# Patient Record
Sex: FEMALE | Race: BLACK OR AFRICAN AMERICAN | Employment: OTHER | URBAN - METROPOLITAN AREA
[De-identification: names, ages, dates, MRNs, and addresses within clinical notes are randomized per-mention and may not be internally consistent; named-entity substitution may affect disease eponyms.]

---

## 2017-01-04 ENCOUNTER — HOSPITAL ENCOUNTER (OUTPATIENT)
Dept: MAMMOGRAPHY | Age: 67
Discharge: HOME | End: 2017-01-04
Attending: INTERNAL MEDICINE
Payer: MEDICAID

## 2017-01-04 DIAGNOSIS — M81.0 OSTEOPOROSIS: ICD-10-CM

## 2017-01-04 PROCEDURE — 77080 DXA BONE DENSITY AXIAL: CPT

## 2017-01-04 NOTE — LETTER
1/16/2017 3:03 PM 
 
Ms. Cj Mercado 
[address] [facility] [address] [id number] Dear Cj Mercado: 
 
Please find your most recent results below. Resulted Orders DEXA BONE DENSITY STUDY AXIAL Narrative Bone Mineral Density Indication:  osteoporosis Age: 61 Sex: Female. Menopause status: Postmenopausal. 
Hormone replacement therapy: No  
 
Number of falls in the past year:   None. Risk factors for osteoporosis:  History of low trauma fracture, history of 
rheumatoid arthritis Current medication for osteoporosis: Actonel. Comparison: None. Technique: Imaging was performed on the [address] [place] [address] 
meta-analysis fracture risk calculator (FRAX) analysis was performed for 10 year 
fracture risk probability assessment Excluded sites: None Findings: 
  
  
Femoral Neck:  Left Bone mineral density (gm/cm2):? 0.983 
% of peak bone mass: 95 
% for age matched controls:? 99 
T-score: -0.4 Z-score: -0.1 Total Hip: Left Bone mineral density (gm/cm2):  0.953 
% of peak bone mass:   95 
% for age matched controls:  80 
T-score:   -0.4 Z-score:  -0.5 Lumbar Spine:  L1-L4 Bone mineral density (gm/cm2):  0.846 
% of peak bone mass:  71  
% for age matched controls:  75 
T-score:  -2.8 Z-score:  -2.3 Impression Impression: This patient is osteoporotic using the World Health Organization criteria 10 year probability of major osteoporotic fracture:  4.3% 10 year probability of hip fracture:  0.3% Recommendations: 
Therapy recommendations need to be tailored to each individual patient. Using 
the [facility] FRAX absolute fracture algorithm, the 
[address] [place] [address] recommends beginning pharmacological therapy in 
postmenopausal women and men over the age of 48 with a 8 year probability of a 
hip fracture of >3% OR with the 10 year probability of a major osteoporotic 
fracture of >20%. Please reconsider testing based on risk factors. Currently, Medicare will only 
reimburse for a central DXA examination every two years, unless the patient is 
on chronic glucocorticoid therapy. Note: Please note that reliable, valid comparisons cannot be made between 
studies which have been performed on machines from different manufacturers. If 
clinically warranted, a follow up study performed at this site, on the same 
unit, would allow the most sensitive assessment of change in bone mineral 
density. Has Osteoporosis Start citracal D 1 bid ( if not on Calcium and vit D) Weight bearing exercises/walking RTC to discuss other treatment options Please call me if you have any questions: [phone] Sincerely, [facility] DEXA 1

## 2017-01-05 NOTE — PROGRESS NOTES
Has Osteoporosis  Start citracal D 1 bid ( if not on Calcium and vit D)  Weight bearing exercises/walking  RTC to discuss other treatment options

## 2017-01-13 ENCOUNTER — OFFICE VISIT (OUTPATIENT)
Dept: ENDOCRINOLOGY | Age: 67
End: 2017-01-13

## 2017-01-13 VITALS
DIASTOLIC BLOOD PRESSURE: 85 MMHG | HEART RATE: 61 BPM | BODY MASS INDEX: 28.58 KG/M2 | SYSTOLIC BLOOD PRESSURE: 135 MMHG | WEIGHT: 167.4 LBS | HEIGHT: 64 IN

## 2017-01-13 RX ORDER — IBUPROFEN 800 MG/1
800 TABLET ORAL
COMMUNITY
Start: 2016-10-24 | End: 2017-01-31 | Stop reason: SDUPTHER

## 2017-01-13 RX ORDER — INTERFERON BETA-1A 30MCG/.5ML
KIT INTRAMUSCULAR
COMMUNITY
Start: 2017-01-04 | End: 2017-01-31 | Stop reason: SDUPTHER

## 2017-01-13 RX ORDER — PANTOPRAZOLE SODIUM 40 MG/1
TABLET, DELAYED RELEASE ORAL
COMMUNITY
Start: 2016-10-25 | End: 2017-01-19 | Stop reason: SDUPTHER

## 2017-01-13 RX ORDER — LANCETS 30 GAUGE
EACH MISCELLANEOUS
Refills: 88 | COMMUNITY
Start: 2017-01-03 | End: 2017-12-29

## 2017-01-13 NOTE — PROGRESS NOTES
Chief Complaint   Patient presents with    New Patient    Diabetes     Type II diabetes. . Last A1C - 8.3% - 1/4/17. Patient is testing BS 3 times daily - before meals.  Diabetic Foot Exam     PCP performed last foot exam.     Other     Next eye exam is due 3/1/17       HPI  This is a 70-year-old -American female with a history of type 2 diabetes mellitus and poor blood sugar control with a recent A1c of 8.3%. She was diagnosed with diabetes in 1999. She was tried on metformin but developed diarrhea with this. She was then placed on Glucotrol and more recently a combination of Glucotrol and Januvia which she has been on for the last 2-3 years. Her blood sugars have not been well controlled. She has a history of MS and she has not very difficult social situation with her daughters and granddaughters who live with her and says she has very little time to care for herself. She usually does not eat breakfast but occasionally she will have hashbrowns and rosado. Lunch is usually a [employer] meatloaf and pizza. She will snack on cheese sticks during the afternoon. Supper can be a chicken breast with cabbage and rice or could be soup. She will snack on grapes or sweet potato pie or occasionally walnuts and dried fruit. She drinks tea/lemonade that is low sugar of about 13 g of carbohydrate per serving. She does not exercise. She complains of fatigue but is not sure whether the fatigue is related to situational issues or related to her diabetes. ROS  She complains of intermittent shortness of breath without chest pain. She denies constipation or diarrhea.     Visit Vitals    /85 (BP 1 Location: Right arm, BP Patient Position: Sitting)    Pulse 61    Ht 5' 4\" (1.626 m)    Wt 167 lb 6.4 oz (75.9 kg)    BMI 28.73 kg/m2       Physical Examination: General appearance - alert, well appearing, and in no distress  Mental status - alert, oriented to person, place, and time  Neck - supple, no significant adenopathy, thyroid exam: thyroid is normal in size without nodules or tenderness  Chest - clear to auscultation, no wheezes, rales or rhonchi, symmetric air entry  Heart - normal rate, regular rhythm, normal S1, S2, no murmurs, rubs, clicks or gallops  Abdomen - soft, nontender, nondistended, no masses or organomegaly  Extremities - peripheral pulses normal, no pedal edema, no clubbing or cyanosis    Diabetic foot exam:     Left: Reflexes 4+     Vibratory sensation normal    Filament test normal sensation with micro filament   Pulse DP: 2+ (normal)   Pulse PT: 2+ (normal)   Deformities: None  Right: Reflexes 4+   Vibratory sensation normal   Filament test normal sensation with micro filament   Pulse DP: 2+ (normal)   Pulse PT: 2+ (normal)   Deformities: None      ASSESSMENT & PLAN  This woman has poorly controlled type 2 diabetes mellitus with a recent A1c of 8.3% on Glucotrol plus Januvia. I think she will probably need insulin. Other medications were probably not optimal given her MS and her treatment with intramuscular interferon. I did ask her to try to avoid the snacks and sugar-containing foods and return in 2 weeks after checking blood sugars twice a day. She has not checked any blood sugars since November.

## 2017-01-19 RX ORDER — ESOMEPRAZOLE MAGNESIUM 20 MG/1
1 TABLET, DELAYED RELEASE ORAL DAILY
Qty: 90 TAB | Refills: 2 | Status: SHIPPED | OUTPATIENT
Start: 2017-01-19 | End: 2017-02-07

## 2017-01-19 RX ORDER — MICONAZOLE NITRATE 2 %
1 CREAM WITH APPLICATOR VAGINAL 2 TIMES DAILY
Qty: 180 TAB | Refills: 2 | Status: SHIPPED | OUTPATIENT
Start: 2017-01-19 | End: 2017-01-20 | Stop reason: SDUPTHER

## 2017-01-20 RX ORDER — VITAMIN E (DL,TOCOPHERYL ACET) 45 MG/0.25
DROPS ORAL
Qty: 60 TAB | Refills: 11 | Status: SHIPPED | OUTPATIENT
Start: 2017-01-20 | End: 2017-02-07

## 2017-01-25 RX ORDER — MICONAZOLE NITRATE 2 %
2 CREAM WITH APPLICATOR VAGINAL 2 TIMES DAILY
COMMUNITY
End: 2017-01-31 | Stop reason: ALTCHOICE

## 2017-01-31 ENCOUNTER — OFFICE VISIT (OUTPATIENT)
Dept: INTERNAL MEDICINE CLINIC | Age: 67
End: 2017-01-31

## 2017-01-31 VITALS
BODY MASS INDEX: 28.34 KG/M2 | SYSTOLIC BLOOD PRESSURE: 125 MMHG | DIASTOLIC BLOOD PRESSURE: 73 MMHG | HEART RATE: 59 BPM | HEIGHT: 64 IN | TEMPERATURE: 97.7 F | RESPIRATION RATE: 18 BRPM | OXYGEN SATURATION: 96 % | WEIGHT: 166 LBS

## 2017-01-31 DIAGNOSIS — I10 ESSENTIAL HYPERTENSION: ICD-10-CM

## 2017-01-31 DIAGNOSIS — F32.A DEPRESSION, UNSPECIFIED DEPRESSION TYPE: ICD-10-CM

## 2017-01-31 DIAGNOSIS — B18.2 HEP C W/O COMA, CHRONIC (HCC): ICD-10-CM

## 2017-01-31 DIAGNOSIS — G35 MS (MULTIPLE SCLEROSIS) (HCC): ICD-10-CM

## 2017-01-31 DIAGNOSIS — E11.9 TYPE 2 DIABETES MELLITUS WITHOUT COMPLICATION, WITHOUT LONG-TERM CURRENT USE OF INSULIN (HCC): Primary | ICD-10-CM

## 2017-01-31 DIAGNOSIS — M81.0 OSTEOPOROSIS: ICD-10-CM

## 2017-01-31 DIAGNOSIS — M06.9 RHEUMATOID ARTHRITIS, INVOLVING UNSPECIFIED SITE, UNSPECIFIED RHEUMATOID FACTOR PRESENCE: ICD-10-CM

## 2017-01-31 RX ORDER — INTERFERON BETA-1A 30MCG/.5ML
0.5 KIT INTRAMUSCULAR
Qty: 4 PEN | Refills: 11 | Status: SHIPPED | OUTPATIENT
Start: 2017-01-31 | End: 2017-12-29

## 2017-01-31 RX ORDER — CITALOPRAM 20 MG/1
TABLET, FILM COATED ORAL
Qty: 30 TAB | Refills: 5 | Status: SHIPPED | OUTPATIENT
Start: 2017-01-31 | End: 2017-08-26 | Stop reason: SDUPTHER

## 2017-01-31 RX ORDER — GLIPIZIDE 10 MG/1
10 TABLET ORAL
Qty: 60 TAB | Refills: 5 | Status: SHIPPED | OUTPATIENT
Start: 2017-01-31 | End: 2017-04-12 | Stop reason: SDUPTHER

## 2017-01-31 RX ORDER — IBUPROFEN 800 MG/1
800 TABLET ORAL
Qty: 30 TAB | Refills: 2 | Status: SHIPPED | OUTPATIENT
Start: 2017-01-31 | End: 2017-02-07

## 2017-01-31 RX ORDER — AMLODIPINE BESYLATE 10 MG/1
TABLET ORAL
Qty: 30 TAB | Refills: 5 | Status: SHIPPED | OUTPATIENT
Start: 2017-01-31 | End: 2017-11-13 | Stop reason: SDUPTHER

## 2017-01-31 RX ORDER — ZOLPIDEM TARTRATE 5 MG/1
5 TABLET ORAL
Qty: 30 TAB | Refills: 0 | Status: SHIPPED | OUTPATIENT
Start: 2017-01-31 | End: 2017-07-14 | Stop reason: SDUPTHER

## 2017-01-31 RX ORDER — CLONIDINE HYDROCHLORIDE 0.1 MG/1
TABLET ORAL
Qty: 60 TAB | Refills: 5 | Status: SHIPPED | OUTPATIENT
Start: 2017-01-31 | End: 2017-08-07 | Stop reason: SDUPTHER

## 2017-01-31 NOTE — PROGRESS NOTES
Reviewed record in preparation for visit and have obtained necessary documentation. Identified pt with two pt identifiers(name and ). Health Maintenance Due   Topic    EYE EXAM RETINAL OR DILATED Q1     COLONOSCOPY     DTaP/Tdap/Td series (1 - Tdap)    ZOSTER VACCINE AGE 60>     MICROALBUMIN Q1          Chief Complaint   Patient presents with    Hypertension    Diabetes    Follow Up Chronic Condition          Learning Assessment:  :     Learning Assessment 2017   PRIMARY LEARNER Patient Patient   HIGHEST LEVEL OF EDUCATION - PRIMARY LEARNER  GRADUATED HIGH SCHOOL OR GED GRADUATED HIGH SCHOOL OR GED   BARRIERS PRIMARY LEARNER OTHER NONE   CO-LEARNER CAREGIVER No No   PRIMARY LANGUAGE ENGLISH ENGLISH   LEARNER PREFERENCE PRIMARY LISTENING LISTENING   ANSWERED BY Patient patient   RELATIONSHIP SELF SELF       Depression Screening:  :     No flowsheet data found. Fall Risk Assessment:  :     No flowsheet data found. Abuse Screening:  :     Abuse Screening Questionnaire 2014   Do you ever feel afraid of your partner? N N   Are you in a relationship with someone who physically or mentally threatens you? N N   Is it safe for you to go home? Y Y       Coordination of Care Questionnaire:  :     1) Have you been to an emergency room, urgent care clinic since your last visit? no   Hospitalized since your last visit? no             2) Have you seen or consulted any other health care providers outside of [address] [place] [address] since your last visit? no  (Include any pap smears or colon screenings in this section.)    3) Do you have an Advance Directive on file? no    4) Are you interested in receiving information on Advance Directives? YES      Patient is accompanied by self I have received verbal consent from Cindi Hogan to discuss any/all medical information while they are present in the room.

## 2017-01-31 NOTE — MR AVS SNAPSHOT
Visit Information Date & Time Provider Department Dept. Phone Encounter #  
 1/31/2017  2:45 PM [facility] Internal Medicine [phone] [id number] Follow-up Instructions Return in about 3 months (around 4/30/2017). Your Appointments 2/7/2017  3:00 PM  
New Patient with DO Kimmy Maloney [facility] [address] [place] [address]) Appt Note: n/p MS $0cp 11/9/16 JR; n/p MS 12/12/2016 CW; n/p MS $0cp 1/3/17 JR; n/p MS $0cp 1/12/2017 CW; n/p MS $0cp 1/26/17 [facility] [address] [facility] [address]  
[address] [facility] [address]  
  
    
 3/3/2017  3:30 PM  
Follow Up with Asia Butcher MD  
[facility] [address] [place] [address]) Appt Note: f/u visit diabetes [address] [id number] [address] [facility] Upcoming Health Maintenance Date Due  
 EYE EXAM RETINAL OR DILATED Q1 7/18/1962 COLONOSCOPY 7/18/1970 DTaP/Tdap/Td series (1 - Tdap) 7/18/1973 ZOSTER VACCINE AGE 60> 7/18/2012 MICROALBUMIN Q1 6/23/2016 HEMOGLOBIN A1C Q6M 7/4/2017 FOOT EXAM Q1 10/25/2017 LIPID PANEL Q1 1/4/2018 BREAST CANCER SCRN MAMMOGRAM 10/17/2018 PAP AKA CERVICAL CYTOLOGY 10/24/2019 Allergies as of 1/31/2017  Review Complete On: 1/31/2017 By: Ollie Mancilla DO Severity Noted Reaction Type Reactions Pcn [Penicillins] High 02/02/2010   Intolerance Swelling Fosamax [Alendronate] Medium 02/02/2010   Intolerance Swelling Sulfa (Sulfonamide Antibiotics) Low 02/02/2010   Topical Itching Current Immunizations  Reviewed on 10/25/2016 Name Date Influenza Nasal Vaccine 9/25/2016 Pneumococcal Polysaccharide (PPSV-23) 1/4/2015 10:52 AM  
  
 Not reviewed this visit You Were Diagnosed With   
  
 Codes Comments Type 2 diabetes mellitus without complication, without long-term current use of insulin (HCC)    -  Primary ICD-10-CM: E11.9 ICD-9-CM: 250.00 Essential hypertension     ICD-10-CM: I10 
ICD-9-CM: 401.9 MS (multiple sclerosis) ([facility]     ICD-10-CM: G35 
ICD-9-CM: 596 Rheumatoid arthritis, involving unspecified site, unspecified rheumatoid factor presence ([facility].)     ICD-10-CM: M06.9 ICD-9-CM: 714.0 Hep C w/o coma, chronic (HCC)     ICD-10-CM: B18.2 ICD-9-CM: 070.54 Osteoporosis     ICD-10-CM: M81.0 ICD-9-CM: 733.00 Depression, unspecified depression type     ICD-10-CM: F32.9 ICD-9-CM: 585 Vitals BP Pulse Temp Resp Height(growth percentile) Weight(growth percentile) 125/73 (BP 1 Location: Right arm, BP Patient Position: Sitting) (!) 59 97.7 °F (36.5 °C) (Oral) 18 5' 4\" (1.626 m) 166 lb (75.3 kg) SpO2 BMI OB Status Smoking Status 96% 28.49 kg/m2 Hysterectomy Never Smoker BMI and BSA Data Body Mass Index Body Surface Area  
 28.49 kg/m 2 1.84 m 2 Preferred Pharmacy Pharmacy Name Phone [facility]/PHARMACY #[id number]hon, [address] [place] [address] [phone] Your Updated Medication List  
  
   
This list is accurate as of: 1/31/17  3:06 PM.  Always use your most recent med list. amLODIPine 10 mg tablet Commonly known as:  Arlyss Hasty TAKE 1 TABLET BY MOUTH EVERY DAY  
  
 ammonium lactate 12 % lotion Commonly known as:  LAC-HYDRIN Apply  to affected area. rub in to affected area well  
  
 aspirin delayed-release 81 mg tablet Take 81 mg by mouth daily. AVONEX 30 mcg/0.5 mL Pnkt Generic drug:  interferon beta-1a  
0.5 mL by IntraMUSCular route every seven (7) days. Blood-Glucose Meter Misc Commonly known as:  ACCU-CHEK ALONSO PLUS METER Test Blood Sugars as directed  
  
 calcium combo no.2-vitamin D3 600 mg calcium- 500 unit Tber Commonly known as:  CITRACAL + D SLOW RELEASE I po BID CINNAMON BARK-CHROMIUM PICOLIN PO Take  by mouth.  
  
 citalopram 20 mg tablet Commonly known as:  CELEXA  
TAKE 1 TABLET BY MOUTH EVERY DAY  
  
 cloNIDine HCl 0.1 mg tablet Commonly known as:  CATAPRES  
TAKE 1 TABLET BY MOUTH TWICE A DAY  
  
 esomeprazole magnesium 20 mg Tbec Commonly known as:  NexIUM 24HR Take 1 Tab by mouth daily. glipiZIDE 10 mg tablet Commonly known as:  Isidro Hanly Take 1 Tab by mouth Before breakfast and dinner. glucose blood VI test strips strip Commonly known as:  CONTOUR NEXT STRIPS Test blood sugar twice daily. ibuprofen 800 mg tablet Commonly known as:  MOTRIN Take 1 Tab by mouth every eight (8) hours as needed. * Lancets Misc Test blood sugar as directed * ULTRA THIN LANCETS 30 gauge Misc Generic drug:  lancets USE TO TEST 3 TIMES DAILY OR AS DIRECTED  
  
 metoprolol succinate 200 mg XL tablet Commonly known as:  TOPROL-XL  
TAKE 1 TABLET BY MOUTH EVERY DAY  
  
 nystatin topical cream  
Commonly known as:  MYCOSTATIN  
  
 SITagliptin 100 mg tablet Commonly known as:  Sadie Bump TAKE 1 TABLET BY MOUTH DAILY  
  
 zolpidem 5 mg tablet Commonly known as:  AMBIEN Take 1 Tab by mouth nightly as needed for Sleep. * Notice: This list has 2 medication(s) that are the same as other medications prescribed for you. Read the directions carefully, and ask your doctor or other care provider to review them with you. Prescriptions Printed Refills  
 zolpidem (AMBIEN) 5 mg tablet 0 Sig: Take 1 Tab by mouth nightly as needed for Sleep. Class: Print Route: Oral  
  
Prescriptions Sent to Pharmacy Refills  
 ibuprofen (MOTRIN) 800 mg tablet 2 Sig: Take 1 Tab by mouth every eight (8) hours as needed. Class: Normal  
 Pharmacy: [address] [place] [address] , [address] [place] [address] Ph #: [phone]  Route: Oral  
 cloNIDine HCl (CATAPRES) 0.1 mg tablet 5 Sig: TAKE 1 TABLET BY MOUTH TWICE A DAY Class: Normal  
 [id number], [address] [place] [address] Ph #: [phone]  
 citalopram (CELEXA) 20 mg tablet 5 Sig: TAKE 1 TABLET BY MOUTH EVERY DAY Class: Normal  
 [id number], [address] [place] [address] Ph #: [phone]  
 amLODIPine (NORVASC) 10 mg tablet 5 Sig: TAKE 1 TABLET BY MOUTH EVERY DAY Class: Normal  
 [id number], [address] [place] [address] Ph #: [phone]  
 glipiZIDE (GLUCOTROL) 10 mg tablet 5 Sig: Take 1 Tab by mouth Before breakfast and dinner. Class: Normal  
 Pharmacy: [address] [place] [address] , [address] [place] [address] Ph #: [phone] Route: Oral  
 AVONEX 30 mcg/0.5 mL pnkt 11 Si.5 mL by IntraMUSCular route every seven (7) days. Class: Normal  
 Pharmacy: [address] [place] [address] , [address] [place] [address] Ph #: [phone] Route: IntraMUSCular Follow-up Instructions Return in about 3 months (around 2017). To-Do List   
 2017 Lab:  HEMOGLOBIN A1C WITH EAG   
  
 2017 Lab:  METABOLIC PANEL, BASIC Introducing [facility]! [facility] introduces [employer] patient portal. Now you can access parts of your medical record, email your doctor's office, and request medication refills online. 1. In your internet browser, go to [email or web address] 2. Click on the First Time User? Click Here link in the Sign In box. You will see the New Member Sign Up page. 3. Enter your [employer] Access Code exactly as it appears below. You will not need to use this code after youve completed the sign-up process. If you do not sign up before the expiration date, you must request a new code. · [employer] Access Code: [email or web address] Expires: 4/3/2017  9:01 AM 
 
4. Enter the last four digits of your Social Security Number (xxxx) and Date of Birth (mm/dd/yyyy) as indicated and click Submit. You will be taken to the next sign-up page. 5. Create a [employer] ID. This will be your [employer] login ID and cannot be changed, so think of one that is secure and easy to remember. 6. Create a [employer] password. You can change your password at any time. 7. Enter your Password Reset Question and Answer. This can be used at a later time if you forget your password. 8. Enter your e-mail address. You will receive e-mail notification when new information is available in [address] 9. Click Sign Up. You can now view and download portions of your medical record. 10. Click the Download Summary menu link to download a portable copy of your medical information. If you have questions, please visit the Frequently Asked Questions section of the [employer] website. Remember, [employer] is NOT to be used for urgent needs. For medical emergencies, dial 911. Now available from your iPhone and Android! Please provide this summary of care documentation to your next provider. Your primary care clinician is listed as Hang Aleman. If you have any questions after today's visit, please call [phone].

## 2017-02-06 NOTE — PROGRESS NOTES
HISTORY OF PRESENT ILLNESS  Sydnie Doss is a 61 y.o. female. Pt. comes in for f/u. Has multiple medical problems. Reports compliance with medications but not her diet. Med list and most recent labs/studies reviewed with pt. A1c was down from 9.1 to 8.3. LDL is 121. Trying to be active physically but not losing weight. Looking for a new neurologist for multiple sclerosis. Needs Avonex refill. Followed by  hepatology for Hep C. Has not seen rheumatology for RA in along time. Recent visit to endocrinologist for DM. Has chronic depression,anxiety and insomnia. Reports having stress. Recent DEXA scan showed osteoporosis. She is on Actonel. Insurance does not cover other meds. Reports chronic pains. Needs med refills. No other new c/o. Hypertension    Associated symptoms include headaches, neck pain and dizziness. Pertinent negatives include no chest pain, no malaise/fatigue, no blurred vision, no shortness of breath, no nausea and no vomiting. Diabetes   Associated symptoms include headaches. Pertinent negatives include no chest pain, no abdominal pain and no shortness of breath. Follow Up Chronic Condition   Associated symptoms include headaches. Pertinent negatives include no chest pain, no abdominal pain and no shortness of breath. Review of Systems   Constitutional: Negative for fever, malaise/fatigue and weight loss. Eyes: Negative for blurred vision. Respiratory: Negative for shortness of breath. Cardiovascular: Negative for chest pain and leg swelling. Gastrointestinal: Positive for heartburn. Negative for abdominal pain, blood in stool, constipation, diarrhea, melena, nausea and vomiting. Genitourinary: Negative for dysuria. Musculoskeletal: Positive for back pain, joint pain, myalgias and neck pain. Negative for falls. Skin: Negative for itching and rash. Neurological: Positive for dizziness, tingling, sensory change and headaches. Negative for focal weakness. Endo/Heme/Allergies: Negative for polydipsia. Psychiatric/Behavioral: Positive for depression and memory loss. The patient is nervous/anxious and has insomnia. All other systems reviewed and are negative. Physical Exam   Constitutional: She is oriented to person, place, and time. She appears well-developed and well-nourished. No distress. overweight   HENT:   Head: Normocephalic and atraumatic. Nose: Nose normal.   Mouth/Throat: No oropharyngeal exudate. Eyes: Conjunctivae are normal. Pupils are equal, round, and reactive to light. Neck: Normal range of motion. Neck supple. No JVD present. No thyromegaly present. Cardiovascular: Normal rate, regular rhythm, normal heart sounds and intact distal pulses. No murmur heard. Pulmonary/Chest: Effort normal and breath sounds normal. No respiratory distress. She has no wheezes. She has no rales. Abdominal: Soft. Bowel sounds are normal. She exhibits no distension. There is no tenderness. obese   Musculoskeletal: She exhibits tenderness (lumbars/knees). She exhibits no edema. djd   Neurological: She is alert and oriented to person, place, and time. Coordination normal.   Skin: Skin is warm and dry. No rash noted. Psychiatric: Her behavior is normal.   Chronically depressed   Nursing note and vitals reviewed. ASSESSMENT and Holmes Opitz was seen today for hypertension, diabetes and follow up chronic condition. Diagnoses and all orders for this visit:    Type 2 diabetes mellitus without complication, without long-term current use of insulin ([facility])  -     METABOLIC PANEL, BASIC; Future  -     HEMOGLOBIN A1C WITH EAG;  Future    Essential hypertension    MS (multiple sclerosis) (HCC)    Rheumatoid arthritis, involving unspecified site, unspecified rheumatoid factor presence ([facility])    Hep C w/o coma, chronic ([facility])    Osteoporosis    Depression, unspecified depression type    Other orders  -     Cancel: REFERRAL TO OPHTHALMOLOGY  - ibuprofen (MOTRIN) 800 mg tablet; Take 1 Tab by mouth every eight (8) hours as needed. -     cloNIDine HCl (CATAPRES) 0.1 mg tablet; TAKE 1 TABLET BY MOUTH TWICE A DAY  -     citalopram (CELEXA) 20 mg tablet; TAKE 1 TABLET BY MOUTH EVERY DAY  -     amLODIPine (NORVASC) 10 mg tablet; TAKE 1 TABLET BY MOUTH EVERY DAY  -     zolpidem (AMBIEN) 5 mg tablet; Take 1 Tab by mouth nightly as needed for Sleep.  -     glipiZIDE (GLUCOTROL) 10 mg tablet; Take 1 Tab by mouth Before breakfast and dinner.  -     AVONEX 30 mcg/0.5 mL pnkt; 0.5 mL by IntraMUSCular route every seven (7) days. Follow-up Disposition:  Return in about 3 months (around 4/30/2017).    lab results and schedule of future lab studies reviewed with patient  reviewed diet, exercise and weight control  reviewed medications and side effects in detail  low cholesterol diet, weight control and daily exercise discussed, home glucose monitoring emphasized, all medications, side effects and compliance discussed carefully, foot care discussed and Podiatry visits discussed, annual eye examinations at Ophthalmology discussed, glycohemoglobin and other lab monitoring discussed and long term diabetic complications discussed  F/u with other MD's as scheduled

## 2017-02-07 ENCOUNTER — OFFICE VISIT (OUTPATIENT)
Dept: NEUROLOGY | Age: 67
End: 2017-02-07

## 2017-02-07 VITALS
BODY MASS INDEX: 29.01 KG/M2 | SYSTOLIC BLOOD PRESSURE: 120 MMHG | RESPIRATION RATE: 18 BRPM | DIASTOLIC BLOOD PRESSURE: 74 MMHG | WEIGHT: 169 LBS | OXYGEN SATURATION: 95 % | HEART RATE: 80 BPM

## 2017-02-07 DIAGNOSIS — G35 MS (MULTIPLE SCLEROSIS) (HCC): Primary | ICD-10-CM

## 2017-02-07 RX ORDER — RISEDRONATE SODIUM 35 MG/1
35 TABLET, FILM COATED ORAL
COMMUNITY
End: 2017-03-02 | Stop reason: ALTCHOICE

## 2017-02-07 RX ORDER — LORAZEPAM 0.5 MG/1
TABLET ORAL
Qty: 2 TAB | Refills: 0 | Status: SHIPPED | OUTPATIENT
Start: 2017-02-07 | End: 2017-07-14 | Stop reason: ALTCHOICE

## 2017-02-07 NOTE — MR AVS SNAPSHOT
Visit Information Date & Time Provider Department Dept. Phone Encounter #  
 2/7/2017  3:00 PM Thony Colmenares, Kanika Funk [facility] at [address] [id number] Follow-up Instructions Return in about 3 months (around 5/7/2017). Your Appointments 3/3/2017  3:30 PM  
Follow Up with Jumana Mcbride MD  
[facility] [address] [place] [address]) Appt Note: f/u visit diabetes [address] [phone] [address] [place] [address] Road  
  
    
 4/28/2017  3:15 PM  
ROUTINE CARE with Rosio Carreon DO [facility] ([address] [facility]) Appt Note: f/u  
 [address] [facility] [address] [place] [address] [facility] [address]  
[phone]  
  
   
 [address] Upcoming Health Maintenance Date Due  
 EYE EXAM RETINAL OR DILATED Q1 7/18/1962 COLONOSCOPY 7/18/1970 DTaP/Tdap/Td series (1 - Tdap) 7/18/1973 ZOSTER VACCINE AGE 60> 7/18/2012 MICROALBUMIN Q1 6/23/2016 HEMOGLOBIN A1C Q6M 7/4/2017 FOOT EXAM Q1 10/25/2017 LIPID PANEL Q1 1/4/2018 BREAST CANCER SCRN MAMMOGRAM 10/17/2018 PAP AKA CERVICAL CYTOLOGY 10/24/2019 Allergies as of 2/7/2017  Review Complete On: 2/7/2017 By: Thony Colmenares DO Severity Noted Reaction Type Reactions Pcn [Penicillins] High 02/02/2010   Intolerance Swelling Fosamax [Alendronate] Medium 02/02/2010   Intolerance Swelling Sulfa (Sulfonamide Antibiotics) Low 02/02/2010   Topical Itching Current Immunizations  Reviewed on 10/25/2016 Name Date Influenza Nasal Vaccine 9/25/2016 Pneumococcal Polysaccharide (PPSV-23) 1/4/2015 10:52 AM  
  
 Not reviewed this visit You Were Diagnosed With   
  
 Codes Comments [facility]) [facility]    -  Primary ICD-10-CM: G35 
ICD-9-CM: 795 Vitals BP Pulse Resp Weight(growth percentile) SpO2 BMI  
 120/74 80 18 169 lb (76.7 kg) 95% 29.01 kg/m2 OB Status Smoking Status Hysterectomy Never Smoker BMI and BSA Data Body Mass Index Body Surface Area  
 29.01 kg/m 2 1.86 m 2 Preferred Pharmacy Pharmacy Name Phone [id number] [address] [place] [address] [address] [phone] Your Updated Medication List  
  
   
This list is accurate as of: 2/7/17  3:43 PM.  Always use your most recent med list.  
  
  
  
  
 ACTONEL 35 mg tablet Generic drug:  risedronate Take 35 mg by mouth every seven (7) days. amLODIPine 10 mg tablet Commonly known as:  [place] Citron TAKE 1 TABLET BY MOUTH EVERY DAY  
  
 AVONEX 30 mcg/0.5 mL Pnkt Generic drug:  interferon beta-1a  
0.5 mL by IntraMUSCular route every seven (7) days. Blood-Glucose Meter Misc Commonly known as:  ACCU-CHEK ALONSO PLUS METER Test Blood Sugars as directed  
  
 citalopram 20 mg tablet Commonly known as:  CELEXA  
TAKE 1 TABLET BY MOUTH EVERY DAY  
  
 cloNIDine HCl 0.1 mg tablet Commonly known as:  CATAPRES  
TAKE 1 TABLET BY MOUTH TWICE A DAY  
  
 glipiZIDE 10 mg tablet Commonly known as:  Manisha Bonds Take 1 Tab by mouth Before breakfast and dinner. glucose blood VI test strips strip Commonly known as:  CONTOUR NEXT STRIPS Test blood sugar twice daily. * Lancets Misc Test blood sugar as directed * ULTRA THIN LANCETS 30 gauge Misc Generic drug:  lancets USE TO TEST 3 TIMES DAILY OR AS DIRECTED LORazepam 0.5 mg tablet Commonly known as:  ATIVAN Take 1 tablet 30 min prior to MRI. May repeat once if needed for max dose of 1mg.  
  
 metoprolol succinate 200 mg XL tablet Commonly known as:  TOPROL-XL  
TAKE 1 TABLET BY MOUTH EVERY DAY SITagliptin 100 mg tablet Commonly known as:  Maria Teresa Sine TAKE 1 TABLET BY MOUTH DAILY  
  
 zolpidem 5 mg tablet Commonly known as:  AMBIEN Take 1 Tab by mouth nightly as needed for Sleep. * Notice: This list has 2 medication(s) that are the same as other medications prescribed for you. Read the directions carefully, and ask your doctor or other care provider to review them with you. Prescriptions Printed Refills LORazepam (ATIVAN) 0.5 mg tablet 0 Sig: Take 1 tablet 30 min prior to MRI. May repeat once if needed for max dose of 1mg. Class: Print Follow-up Instructions Return in about 3 months (around 5/7/2017). To-Do List   
 02/07/2017 Imaging:  MRI BRAIN W WO CONT Patient Instructions A Healthy Lifestyle: Care Instructions Your Care Instructions A healthy lifestyle can help you feel good, stay at a healthy weight, and have plenty of energy for both work and play. A healthy lifestyle is something you can share with your whole family. A healthy lifestyle also can lower your risk for serious health problems, such as high blood pressure, heart disease, and diabetes. You can follow a few steps listed below to improve your health and the health of your family. Follow-up care is a key part of your treatment and safety. Be sure to make and go to all appointments, and call your doctor if you are having problems. Its also a good idea to know your test results and keep a list of the medicines you take. How can you care for yourself at home? · Do not eat too much sugar, fat, or fast foods. You can still have dessert and treats now and then. The goal is moderation. · Start small to improve your eating habits. Pay attention to portion sizes, drink less juice and soda pop, and eat more fruits and vegetables. ¨ Eat a healthy amount of food. A 3-ounce serving of meat, for example, is about the size of a deck of cards. Fill the rest of your plate with vegetables and whole grains. ¨ Limit the amount of soda and sports drinks you have every day.  Drink more water when you are thirsty. ¨ Eat at least 5 servings of fruits and vegetables every day. It may seem like a lot, but it is not hard to reach this goal. A serving or helping is 1 piece of fruit, 1 cup of vegetables, or 2 cups of leafy, raw vegetables. Have an apple or some carrot sticks as an afternoon snack instead of a candy bar. Try to have fruits and/or vegetables at every meal. 
· Make exercise part of your daily routine. You may want to start with simple activities, such as walking, bicycling, or slow swimming. Try to be active 30 to 60 minutes every day. You do not need to do all 30 to 60 minutes all at once. For example, you can exercise 3 times a day for 10 or 20 minutes. Moderate exercise is safe for most people, but it is always a good idea to talk to your doctor before starting an exercise program. 
· Keep moving. Clint Feeling the lawn, work in the garden, or [employer]. Take the stairs instead of the elevator at work. · If you smoke, quit. People who smoke have an increased risk for heart attack, stroke, cancer, and other lung illnesses. Quitting is hard, but there are ways to boost your chance of quitting tobacco for good. ¨ Use nicotine gum, patches, or lozenges. ¨ Ask your doctor about stop-smoking programs and medicines. ¨ Keep trying. In addition to reducing your risk of diseases in the future, you will notice some benefits soon after you stop using tobacco. If you have shortness of breath or asthma symptoms, they will likely get better within a few weeks after you quit. · Limit how much alcohol you drink. Moderate amounts of alcohol (up to 2 drinks a day for men, 1 drink a day for women) are okay. But drinking too much can lead to liver problems, high blood pressure, and other health problems. Family health If you have a family, there are many things you can do together to improve your health. · Eat meals together as a family as often as possible. · Eat healthy foods. This includes fruits, vegetables, lean meats and dairy, and whole grains. · Include your family in your fitness plan. Most people think of activities such as jogging or tennis as the way to fitness, but there are many ways you and your family can be more active. Anything that makes you breathe hard and gets your heart pumping is exercise. Here are some tips: 
¨ Walk to do errands or to take your child to school or the bus. ¨ Go for a family bike ride after dinner instead of watching TV. Where can you learn more? Go to http://addisThe Spirit Projectjohnathon.info/. Enter Q268 in the search box to learn more about \"A Healthy Lifestyle: Care Instructions. \" Current as of: July 26, 2016 Content Version: 11.1 © 7162-1979 comment.com. Care instructions adapted under license by [employer] (which disclaims liability or warranty for this information). If you have questions about a medical condition or this instruction, always ask your healthcare professional. [place] [address] any warranty or liability for your use of this information. Introducing [facility]! [facility] introduces [employer] patient portal. Now you can access parts of your medical record, email your doctor's office, and request medication refills online. 1. In your internet browser, go to [email or web address] 2. Click on the First Time User? Click Here link in the Sign In box. You will see the New Member Sign Up page. 3. Enter your [employer] Access Code exactly as it appears below. You will not need to use this code after youve completed the sign-up process. If you do not sign up before the expiration date, you must request a new code. · [employer] Access Code: [email or web address] Expires: 4/3/2017  9:01 AM 
 
4. Enter the last four digits of your Social Security Number (xxxx) and Date of Birth (mm/dd/yyyy) as indicated and click Submit.  You will be taken to the next sign-up page. 5. Create a [employer] ID. This will be your [employer] login ID and cannot be changed, so think of one that is secure and easy to remember. 6. Create a [employer] password. You can change your password at any time. 7. Enter your Password Reset Question and Answer. This can be used at a later time if you forget your password. 8. Enter your e-mail address. You will receive e-mail notification when new information is available in [address]. 9. Click Sign Up. You can now view and download portions of your medical record. 10. Click the Download Summary menu link to download a portable copy of your medical information. If you have questions, please visit the Frequently Asked Questions section of the [employer] website. Remember, [employer] is NOT to be used for urgent needs. For medical emergencies, dial 911. Now available from your iPhone and Android! Please provide this summary of care documentation to your next provider. Your primary care clinician is listed as Vidhi Clark. If you have any questions after today's visit, please call [phone].

## 2017-02-07 NOTE — PROGRESS NOTES
MS  C/o sharp to right side of head/front/back for several months  Episode where unable to move hand \"it got stuck\"

## 2017-02-07 NOTE — PROGRESS NOTES
[facility]   NEW PATIENT EVALUATION/CONSULTATION       PATIENT NAME: Bigg Diaz    MRN: [id number]    REASON FOR CONSULTATION: MS    02/07/17      Previous records (physician notes, laboratory reports, and radiology reports) and imaging studies were reviewed and summarized. My recommendations will be communicated back to the patient's physician(s) via electronic medical record and/or by [address] mail. HISTORY OF PRESENT ILLNESS:  Bigg Diaz is a 61 y.o. right handed female with a PMH including DM, HTN, HCV, remote stroke presenting for evaluation of MS. Date of onset: 1997-experienced knee buckling with falls, paresthesias L side later progressing to both feet, +fatigue, +optic neuritis b/l  Dx made via MRI and LP. Date of diagnosis: 1999    Disease course from Onset: RRMS    Disease course last year: RRMS, stable    Last imaging:  MRI Brain [facility] 8/2014:    1. Multifocal diffuse white matter disease in the brain is similar to the   prior study 10/20/12. No abnormal enhancement. 2. Mild interval progression in punctate foci of hemosiderin in the basal   ganglia suggesting small vessel disease. Therefore it is possible some of   the white matter disease may also be related to chronic small vessel   ischemic change. 3. No acute intracranial abnormality on today's exam.      Medications for MS Used in the Past:   Avonex (current)- hair loss, doesn't like injections  Tecfidera- could not swallow pills, +nausea, flushing    Narrative Describing Illness:  Baseline MS sx-denies focal weakness, numbness. +blurred vision b/l    Sleep: impaired due to nocturia    Fatigue: severe fatigue    Memory/Concentration:  Mild forgetfulness    Bladder: frequent urination, urinary urgency    Bowel: constipated    Depression: +Anxiety, mild depression on medication.       Pain: intermittent LBP      PAST MEDICAL HISTORY:  Past Medical History   Diagnosis Date    Arthritis      OSTEO A & OSTEOPOROSIS  Depression     Diabetes ([facility] [address]     GERD (gastroesophageal reflux disease)     Hepatitis C     Hypertension     LBP (low back pain)     Lipoma 9/3/2013    Liver disease      CHRONIC HEP C    MS (multiple sclerosis) (HCC)     Other ill-defined conditions(799.89)      MS    Other ill-defined conditions(799.89)      HX DRUG ABUSE. CLEAN FOR 21 YRS.  Psychiatric disorder      DEPRESSION.  Stroke [facility])      Diagnosed 10/2011    Urinary incontinence        PAST SURGICAL HISTORY:  Past Surgical History   Procedure Laterality Date    Hx hysterectomy      Hx gi       COLONOSCOPY X 1    Hx lipoma resection  9/6/13      Excision of lipoma, left posterior thigh       FAMILY HISTORY:   Family History   Problem Relation Age of Onset   [address] Arthritis-rheumatoid Mother     Hypertension Mother     Cancer Father     Breast Cancer Maternal Aunt     Breast Cancer Maternal Aunt          SOCIAL HISTORY:  Social History     Social History    Marital status: SINGLE     Spouse name: N/A    Number of children: N/A    Years of education: N/A     Social History Main Topics    Smoking status: Never Smoker    Smokeless tobacco: Never Used    Alcohol use No    Drug use: No    Sexual activity: No     Other Topics Concern    Not on file     Social History Narrative         MEDICATIONS:   Current Outpatient Prescriptions   Medication Sig Dispense Refill    risedronate (ACTONEL) 35 mg tablet Take 35 mg by mouth every seven (7) days.  cloNIDine HCl (CATAPRES) 0.1 mg tablet TAKE 1 TABLET BY MOUTH TWICE A DAY 60 Tab 5    citalopram (CELEXA) 20 mg tablet TAKE 1 TABLET BY MOUTH EVERY DAY 30 Tab 5    amLODIPine (NORVASC) 10 mg tablet TAKE 1 TABLET BY MOUTH EVERY DAY 30 Tab 5    zolpidem (AMBIEN) 5 mg tablet Take 1 Tab by mouth nightly as needed for Sleep. 30 Tab 0    glipiZIDE (GLUCOTROL) 10 mg tablet Take 1 Tab by mouth Before breakfast and dinner.  60 Tab 5    AVONEX 30 mcg/0.5 mL pnkt 0.5 mL by IntraMUSCular route every seven (7) days. 4 Pen 11    ULTRA THIN LANCETS 30 gauge misc USE TO TEST 3 TIMES DAILY OR AS DIRECTED  88    SITagliptin (JANUVIA) 100 mg tablet TAKE 1 TABLET BY MOUTH DAILY 30 Tab 5    metoprolol succinate (TOPROL-XL) 200 mg XL tablet TAKE 1 TABLET BY MOUTH EVERY DAY 30 Tab 5    Lancets misc Test blood sugar as directed 1 Package 11    glucose blood VI test strips (CONTOUR NEXT STRIPS) strip Test blood sugar twice daily. 100 Strip 11    Blood-Glucose Meter (ACCU-CHEK ALONSO PLUS METER) misc Test Blood Sugars as directed 1 Each 3         ALLERGIES:  Allergies   Allergen Reactions    Pcn [Penicillins] Swelling    Fosamax [Alendronate] Swelling    Sulfa (Sulfonamide Antibiotics) Itching         REVIEW OF SYSTEMS:  10 point ROS reviewed with patient. Please see scanned document under media. PHYSICAL EXAM:  Vital Signs:   Visit Vitals    /74    Pulse 80    Resp 18    Wt 76.7 kg (169 lb)    SpO2 95%    BMI 29.01 kg/m2        General Medical Exam:  General:  Well appearing, comfortable, in no apparent distress. Eyes/ENT: see cranial nerve examination. Neck: No masses appreciated. Full range of motion without tenderness. Respiratory:  Clear to auscultation, good air entry bilaterally. Cardiac:  Regular rate and rhythm, no murmur. GI:  Soft, non-tender, non-distended abdomen. Bowel sounds normal. No masses, organomegaly. Extremities:  No deformities, edema, or skin discoloration. Skin:  No rashes or lesions. Neurological:  · Mental Status:  Alert and oriented to person, place, and time with fluent speech. · Cranial Nerves:   CNII/III/IV/VI: visual fields full to confrontation, EOMI, PERRL, no ptosis or nystagmus.    CN V: Facial sensation intact bilaterally, masseter 5/5   CN VII: Facial muscles symmetric and strong   CN VIII: Hears finger rub well bilaterally, intact vestibular function   CN IX/X: Normal palatal movement   CN XI: Full strength shoulder shrug bilaterally CN XII: Tongue protrusion full and midline without fasciculation or atrophy  · Motor: Normal tone and muscle bulk with no pronator drift. .  Individual muscle group testing:  Shoulder abduction:   Left:limited due to pain   Right : 5/5    Shoulder adduction:   Left:5/5   Right : 5/5    Elbow flexion:      Left:5/5   Right : 5/5  Elbow extension:    Left:5/5   Right : 5/5   Wrist flexion:    Left:5/5   Right : 5/5  Wrist extension:    Left:5/5   Right : 5/5  Arm pronation:   Left:5/5   Right : 5/5  Arm supination:   Left:5/5   Right : 5/5    Finger flexion:    Left:5/5   Right : 5/5    Finger extension:   Left:5/5   Right : 5/5   Finger abduction:  Left:5/5   Right : 5/5   Finger adduction:   Left:5/5   Right : 5/5  Hip flexion:     Left:5/5   Right : 5/5         Hip extension:   Left:5/5   Right : 5/5    Knee flexion:    Left:5/5   Right : 5/5    Knee extension:   Left:5/5   Right : 5/5    Dorsiflexion:     Left:5/5   Right : 5/5  Plantar flexion:    Left:5/5   Right : 5/5      · MSRs: No crossed adductors or clonus. RIGHT  LEFT   Brachioradialis 1+ 1+   Biceps 1+ 1+   Triceps 1+ 1+   Knee 3+ 3+   Achilles 2+ 2+        Plantar response Downward Downward          · Sensation: Normal and symmetric perception of pinprick, temperature, light touch, proprioception, and vibration; (-) Romberg. · Coordination: No dysmetria. Normal rapid alternating movements; finger-to-nose and heel-to- shin testing are within normal limits. · Gait: Normal native and stress (tandem/heel/toe walking). PERTINENT DATA:  INTERNAL RECORDS:  The patient's electronic medical record was reviewed. The relevant details include:    CT Results (maximum last 3): Results from [place] encounter on 09/29/11   CTA HEAD   Narrative **Final Report**      ICD Codes / Adm. Diagnosis: 442.9   / ANEURYSM    Examination:  CT HEAD ANGIOGRAPHY  - [id number] - Sep 29 2011 11:31AM  Accession No:  [id number]  Reason:  ANEURYSM     ADDENDUM   The original report and interpretation was provided by Dr. Cori Halsted. 3D   postprocessing was performed for this CTA including MIP and volume rendered   imaging. END OF ADDENDUM     REPORT:  INDICATION:  ANEURYSM     CTA Head is performed with helical axial imaging with bolus injection of 100   mL Optiray 350 contrast with post processing performed and 2D sagittal and   coronal reformatted images provided. Pre- and postenhanced head CT images   provided. Intracranial circulation shows no major vessel occlusion, intraluminal   embolus, aneurysm, AVM or evidence of irregularity suggestive of vasculitis. Images of the brain show no bleed, mass, shift, hydrocephalus,  or abnormal   enhancement. IMPRESSION: Normal CTA Head. No apparent aneurysm. **SEE ADDENDUM AT TOP OF THE REPORT**    Signing/Reading Doctor: Cuca De La Paz ([id number]) Addendum Reading: Lottie Sebastian ([id number])  Approved: Cuca De La Paz ([id number])  09/30/2011                        Addendum Approved: Jaylin Delatorre ([id number])  12/13/2011            Results from Hospital Encounter encounter on 07/29/09   CT HEAD W/O CONTRAST   Narrative Final Report      ICD Codes / Adm. Diagnosis:    /   HEAD PAIN, ARM PAIN, PARALYSIS  Examination:  HEAD WO CON  - [id number] - Jul 29 2009  7:19PM  Accession No:  [id number]  Reason:  REASON: HEADACHE      REPORT:  INDICATION: Headache. COMPARISON: None. TECHNIQUE: Unenhanced CT of the head was performed using 5 mm images. Brain   and bone windows were generated. FINDINGS:  The ventricles and sulci are normal in size, shape and configuration and   midline. There are foci of decreased attenuation in the high right parietal   white matter in the periventricular white matter adjacent to the frontal   horns of the lateral ventricles as well as in the frontal white matter   bilaterally. These are nonspecific and could be related gall vessel ischemic   disease or other demyelinating process.  There is no intracranial hemorrhage,   extra-axial collection, mass, mass effect or midline shift. The basilar   cisterns are open. No acute infarct is identified. The bone windows   demonstrate no abnormalities. The visualized portions of the paranasal   sinuses and mastoid air cells are clear. IMPRESSION: Nonspecific white matter disease. Interpreting/Reading Doctor: Neha Izaguirre ([id number])  Transcribed: n/a on 07/29/2009  Approved: Neha Izaguirre ([id number])  07/29/2009          Distribution:  Attending Doctor:  ED PHYSICIAN  Alternate Doctor:  ED PHYSICIAN            MRI Results (maximum last 3): Results from [place] encounter on 09/18/14   MRI BRAIN W WO CONT   Narrative **Final Report**      ICD Codes / Adm. Diagnosis: 340   / Multiple sclerosis  ms  Examination:  MR BRAIN W AND WO CON  - [id number] - Sep 18 2014 11:13AM  Accession No:  [id number]  Reason:  340      REPORT:  INDICATION:  Severe pain left-sided head sometimes on the right. Multiple   sclerosis. Code 340. Diagnosed 1999  TECHNIQUE: Sagittal and axial T1 weighted images were obtained followed by   intravenous infusion 7 mL Gadavist and axial FLAIR and T-2 weighted images. Delayed axial and coronal postgadolinium T1-weighted images were obtained as   well as sagittal FLAIR images and axial diffusion weighted images. Patient was claustrophobic and had anxiolysis with intravenous   administration 1.5 mg Versed with pulse oximetry and direct visual   monitoring. COMPARISON: MRI of the head 10/20/12  FINDINGS:  Numerous small foci of T2 hyperintensity throughout the cerebral white   matter with mild confluence in the parietal periventricular regions. No associated abnormal enhancement or restricted diffusion. Many of the foci have characteristics suggestive of clinical history of   multiple sclerosis. Some of the foci are nonspecific.   Of note is an interval progression of punctate foci of hemosiderin   deposition on the gradient echo T2-weighted images. In addition to the right   temporal focus on the prior exam the patient has developed therefore more   foci related to the basal ganglia/thalami. This is in a pattern suggesting   chronic small vessel type disease. No evidence of acute brain infarction, acute hemorrhage, mass or abnormal   extra-axial fluid collections. Normal flow-voids are present in the vertebral, basilar and carotid artery   systems. The craniocervical junction is normal.  The other structures of the cranial base including paranasal sinuses are    unremarkable. IMPRESSION:  1. Multifocal diffuse white matter disease in the brain is similar to the   prior study 10/20/12. No abnormal enhancement. 2. Mild interval progression in punctate foci of hemosiderin in the basal   ganglia suggesting small vessel disease. Therefore it is possible some of   the white matter disease may also be related to chronic small vessel   ischemic change. 3. No acute intracranial abnormality on today's exam.           Signing/Reading Doctor: Jermaine Abrams ([id number])    Approved: Jermaine Abrams ([id number])  Sep 18 2014 11:26AM                                 Results from [place] encounter on 10/20/12   MRI BRAIN W WO CONT   Narrative **Final Report**      ICD Codes / Adm. Diagnosis: 340   / Multiple sclerosis    Examination:  MR BRAIN W AND WO CON  - [id number] - Oct 20 2012  1:45PM  Accession No:  [id number]  Reason:  ms      REPORT:  INDICATION: Multiple sclerosis severe headaches over last 2 weeks    COMPARISON: MRI 07/28/2011    EXAM: Sagittal T1-weighted spin-echo, axial and sagittal FLAIR , axial   T2-weighted fast spin-echo, axial diffusion weighted echo planar, axial   T1-weighted spin-echo, axial gradient echo, and post IV contrast-enhanced   axial and coronal T1-weighted spin-echo MR images of the brain are obtained. A total of 16 cc intravenous Magnevist was administered for the study.     FINDINGS: There is no evidence for acute infarction. There remains no   intra-axial or extra-axial enhancement abnormality or mass effect. Innumerable nonenhancing foci of intra-axial signal change are again noted   on FLAIR and T2-weighted images with areas of confluence around the atria   the lateral ventricles bilaterally and the posterior body of the right   lateral ventricle. The number and extent of findings is stable. There   remains normal ventricular and sulcal morphology. The vascular flow-voids at   the base of the brain remain normal in conspicuity. Sella, optic chiasm,   orbits and paranasal sinuses remain normal.       IMPRESSION: Stable MR imaging appearance of the brain with innumerable foci   of intra-axial signal change again noted. Signing/Reading Doctor: Noemí Joseph. Marga Medrano ([id number])    Approved: CHARMAINE Medrano ([id number])  10/22/2012                                    Results from Hospital Encounter encounter on 07/28/11   MRI SPINE CERVICAL W AND W/O CONT   Narrative **Final Report**      ICD Codes / Adm. Diagnosis: 340   / MS (MULTIPLE SCLEROSIS)    Examination:  MR Taj No CON  - [id number] - Jul 28 2011  5:39PM  Accession No:  [id number]  Reason:  340      REPORT:  INDICATION:  Multiple sclerosis    COMPARISON: None. TECHNIQUE:   MR imaging of the cervical spine was performed including sagittal T1, T2,   STIR;  axial GRE, T2, T1. Sagittal and iso-T1 postgadolinium    Contrast: 15 mL Magnevist IV    FINDINGS:  There is normal alignment of the cervical spine. Vertebral body heights are   maintained. Marrow signal is normal.  The craniocervical junction is intact. The course, caliber, and signal intensity of the spinal cord are normal.    Although there is no definite abnormal enhancement noted, post contrast   images all demonstrate motion artifact and are limited in detail. C2/3:   The spinal canal and neuroforamina are widely patent. C3/4:   The spinal canal and neuroforamina are widely patent.     C4/5: Loss of disc height indicates degenerative disc disease change. There   is spondylosis which impinges on the central canal the left of midline. Minimal flattening of the cord is noted. There is no signal change. C5/6:  Loss of disc height indicates degenerative disc disease changes. There is bilateral spondylosis with very minimal flattening of the cord. Narrowing of the right neural foramen is suspected. C6/7:   The spinal canal and neuroforamina are widely patent. C7/T1:   The spinal canal and neuroforamina are widely patent. IMPRESSION:    1. No intrinsic abnormality the cord is identified, however, post contrast   images demonstrate motion. STIR also demonstrates motion which degrades   detail. 2. Degenerative disease changes at C4-C5 and C5-C6 with spondylosis as   described above. 3. If previous studies can be obtained by the patient, comparison   interpretation will be rendered. Signing/Reading Doctor: Yuki March ([id number])  Transcribed:  on 07/29/2011  Approved: Yuki March ([id number])  07/29/2011          Distribution:  Attending Doctor: Radha Martinez              ASSESSMENT:      ICD-10-CM ICD-9-CM    1. [facility] G35 340 risedronate (ACTONEL) 35 mg tablet      LORazepam (ATIVAN) 0.5 mg tablet      MRI BRAIN W WO CONT   Pleasant 61year old RHAAF h/o DM, HTN, HCV, depression and remote stroke presenting for evaluation of MS dx 1999 (prior optic neuritis b/l, L paresthesias) on Avonex. Clinically, she appears stable without focal deficits although endorses significant fatigue. Denies recent exacerbations. Prior MRI Brain from 2014 independently reviewed and c/w MS with moderate periventricular T2 hyperintensities. No evidence of cervical lesions on MRI C spine. Will repeat neuroimaging to monitor for radiographic progression of disease activity on Avonex. We reviewed the multiple sclerosis diagnosis, prognosis, and implications. We reviewed the 3-pronged treatment strategy: treating acute flares, using preventative treatments to prevent future relapses and brain lesions, and treating residual symptoms. For long-term treatment, I recommend continuation of Avonex. Discussed medication dosage, usage, goals of therapy, and side effects. PLAN:  · CBC, CMP, TSH q6 months  · Cont. Avonex once weekly injections  · Cont. Vit D supplementation  · MRI Brain WWO to eval for interval progression      Follow-up Disposition:  Return in about 3 months (around 5/7/2017). Morgan Triana DO  Staff Neurologist  Diplomate, American Board of Psychiatry & Neurology       CC Referring provider:    Constantine Townsend DO

## 2017-02-07 NOTE — PATIENT INSTRUCTIONS

## 2017-02-08 LAB
ALBUMIN SERPL-MCNC: 4.4 G/DL (ref 3.6–4.8)
ALBUMIN/GLOB SERPL: 1.4 {RATIO} (ref 1.1–2.5)
ALP SERPL-CCNC: 110 IU/L (ref 39–117)
ALT SERPL-CCNC: 15 IU/L (ref 0–32)
AST SERPL-CCNC: 19 IU/L (ref 0–40)
BASOPHILS # BLD AUTO: 0 X10E3/UL (ref 0–0.2)
BASOPHILS NFR BLD AUTO: 0 %
BILIRUB SERPL-MCNC: 0.2 MG/DL (ref 0–1.2)
BUN SERPL-MCNC: 12 MG/DL (ref 8–27)
BUN/CREAT SERPL: 11 (ref 11–26)
CALCIUM SERPL-MCNC: 10.4 MG/DL (ref 8.7–10.3)
CHLORIDE SERPL-SCNC: 93 MMOL/L (ref 96–106)
CO2 SERPL-SCNC: 26 MMOL/L (ref 18–29)
CREAT SERPL-MCNC: 1.06 MG/DL (ref 0.57–1)
EOSINOPHIL # BLD AUTO: 0.1 X10E3/UL (ref 0–0.4)
EOSINOPHIL NFR BLD AUTO: 1 %
ERYTHROCYTE [DISTWIDTH] IN BLOOD BY AUTOMATED COUNT: 13.8 % (ref 12.3–15.4)
GLOBULIN SER CALC-MCNC: 3.2 G/DL (ref 1.5–4.5)
GLUCOSE SERPL-MCNC: 178 MG/DL (ref 65–99)
HCT VFR BLD AUTO: 40.5 % (ref 34–46.6)
HGB BLD-MCNC: 13.1 G/DL (ref 11.1–15.9)
IMM GRANULOCYTES # BLD: 0 X10E3/UL (ref 0–0.1)
IMM GRANULOCYTES NFR BLD: 1 %
LYMPHOCYTES # BLD AUTO: 2.5 X10E3/UL (ref 0.7–3.1)
LYMPHOCYTES NFR BLD AUTO: 41 %
MCH RBC QN AUTO: 26.3 PG (ref 26.6–33)
MCHC RBC AUTO-ENTMCNC: 32.3 G/DL (ref 31.5–35.7)
MCV RBC AUTO: 81 FL (ref 79–97)
MONOCYTES # BLD AUTO: 0.5 X10E3/UL (ref 0.1–0.9)
MONOCYTES NFR BLD AUTO: 9 %
NEUTROPHILS # BLD AUTO: 3 X10E3/UL (ref 1.4–7)
NEUTROPHILS NFR BLD AUTO: 48 %
PLATELET # BLD AUTO: 248 X10E3/UL (ref 150–379)
POTASSIUM SERPL-SCNC: 4.2 MMOL/L (ref 3.5–5.2)
PROT SERPL-MCNC: 7.6 G/DL (ref 6–8.5)
RBC # BLD AUTO: 4.99 X10E6/UL (ref 3.77–5.28)
SODIUM SERPL-SCNC: 137 MMOL/L (ref 134–144)
T4 FREE SERPL-MCNC: 1.4 NG/DL (ref 0.82–1.77)
TSH SERPL DL<=0.005 MIU/L-ACNC: 0.28 UIU/ML (ref 0.45–4.5)
WBC # BLD AUTO: 6.1 X10E3/UL (ref 3.4–10.8)

## 2017-02-27 ENCOUNTER — TELEPHONE (OUTPATIENT)
Dept: INTERNAL MEDICINE CLINIC | Age: 67
End: 2017-02-27

## 2017-02-27 NOTE — TELEPHONE ENCOUNTER
Called pt to inform her about we were awaiting for actonel PA decision and she said that actonel wasn't working that well and that Dr Miguel Brown was looking into other alternatives to actonel.   Message forwarded to provider to evaluate and prescribe something if he deems it appropriate

## 2017-02-27 NOTE — TELEPHONE ENCOUNTER
Called insurance to initiate PA for risedronate sodium 35mg. Case # assigned is [id number]. Rationale is that pt has osteoporosis (M81.0) and RA ( M06.9) with a z score of her lumbar spine of -2.3. Also of significance is that pt is allergic to fosamax and has been on actonel since at least 9/9/2014. .  Our office should hear something within 24 hours. Pt made aware.  Will await approval/denial.

## 2017-02-28 NOTE — TELEPHONE ENCOUNTER
Dr Elida Painting recommended boniva as Ms Mckenzie Castillo said that the actonel is not working. Will send to provider to review and sign as there are some contraindications that popped up.   If he signs i will let pt know

## 2017-03-01 NOTE — TELEPHONE ENCOUNTER
Received fax and risedronate is approved from 2/27/17-2/27/18 with the request# [id number].  Will let pt know

## 2017-03-02 RX ORDER — IBANDRONATE SODIUM 150 MG/1
150 TABLET, FILM COATED ORAL
Qty: 1 TAB | Refills: 5 | Status: SHIPPED | OUTPATIENT
Start: 2017-03-02 | End: 2017-07-14

## 2017-03-14 ENCOUNTER — APPOINTMENT (OUTPATIENT)
Dept: GENERAL RADIOLOGY | Age: 67
End: 2017-03-14
Attending: STUDENT IN AN ORGANIZED HEALTH CARE EDUCATION/TRAINING PROGRAM
Payer: MEDICAID

## 2017-03-14 ENCOUNTER — HOSPITAL ENCOUNTER (EMERGENCY)
Age: 67
Discharge: HOME | End: 2017-03-14
Attending: EMERGENCY MEDICINE
Payer: MEDICAID

## 2017-03-14 VITALS
RESPIRATION RATE: 16 BRPM | TEMPERATURE: 98.6 F | WEIGHT: 170 LBS | HEIGHT: 63 IN | DIASTOLIC BLOOD PRESSURE: 84 MMHG | SYSTOLIC BLOOD PRESSURE: 143 MMHG | HEART RATE: 90 BPM | OXYGEN SATURATION: 96 % | BODY MASS INDEX: 30.12 KG/M2

## 2017-03-14 DIAGNOSIS — J11.1 INFLUENZA-LIKE ILLNESS: Primary | ICD-10-CM

## 2017-03-14 LAB
ALBUMIN SERPL BCP-MCNC: 3.5 G/DL (ref 3.5–5)
ALBUMIN/GLOB SERPL: 0.9 {RATIO} (ref 1.1–2.2)
ALP SERPL-CCNC: 103 U/L (ref 45–117)
ALT SERPL-CCNC: 24 U/L (ref 12–78)
ANION GAP BLD CALC-SCNC: 8 MMOL/L (ref 5–15)
APPEARANCE UR: ABNORMAL
AST SERPL W P-5'-P-CCNC: 21 U/L (ref 15–37)
ATRIAL RATE: 68 BPM
BACTERIA URNS QL MICRO: NEGATIVE /HPF
BASOPHILS # BLD AUTO: 0 K/UL (ref 0–0.1)
BASOPHILS # BLD: 0 % (ref 0–1)
BILIRUB SERPL-MCNC: 0.2 MG/DL (ref 0.2–1)
BILIRUB UR QL CFM: NEGATIVE
BUN SERPL-MCNC: 9 MG/DL (ref 6–20)
BUN/CREAT SERPL: 10 (ref 12–20)
CALCIUM SERPL-MCNC: 8.3 MG/DL (ref 8.5–10.1)
CALCULATED P AXIS, ECG09: 1 DEGREES
CALCULATED R AXIS, ECG10: 37 DEGREES
CALCULATED T AXIS, ECG11: 54 DEGREES
CHLORIDE SERPL-SCNC: 94 MMOL/L (ref 97–108)
CO2 SERPL-SCNC: 26 MMOL/L (ref 21–32)
COLOR UR: ABNORMAL
CREAT SERPL-MCNC: 0.94 MG/DL (ref 0.55–1.02)
DIAGNOSIS, 93000: NORMAL
EOSINOPHIL # BLD: 0 K/UL (ref 0–0.4)
EOSINOPHIL NFR BLD: 0 % (ref 0–7)
EPITH CASTS URNS QL MICRO: ABNORMAL /LPF
ERYTHROCYTE [DISTWIDTH] IN BLOOD BY AUTOMATED COUNT: 13.2 % (ref 11.5–14.5)
GLOBULIN SER CALC-MCNC: 4 G/DL (ref 2–4)
GLUCOSE SERPL-MCNC: 299 MG/DL (ref 65–100)
GLUCOSE UR STRIP.AUTO-MCNC: >1000 MG/DL
HCT VFR BLD AUTO: 39 % (ref 35–47)
HGB BLD-MCNC: 12.6 G/DL (ref 11.5–16)
HGB UR QL STRIP: NEGATIVE
HYALINE CASTS URNS QL MICRO: ABNORMAL /LPF (ref 0–5)
KETONES UR QL STRIP.AUTO: NEGATIVE MG/DL
LEUKOCYTE ESTERASE UR QL STRIP.AUTO: ABNORMAL
LYMPHOCYTES # BLD AUTO: 16 % (ref 12–49)
LYMPHOCYTES # BLD: 1 K/UL (ref 0.8–3.5)
MCH RBC QN AUTO: 26 PG (ref 26–34)
MCHC RBC AUTO-ENTMCNC: 32.3 G/DL (ref 30–36.5)
MCV RBC AUTO: 80.6 FL (ref 80–99)
MONOCYTES # BLD: 0.7 K/UL (ref 0–1)
MONOCYTES NFR BLD AUTO: 10 % (ref 5–13)
NEUTS SEG # BLD: 4.7 K/UL (ref 1.8–8)
NEUTS SEG NFR BLD AUTO: 74 % (ref 32–75)
NITRITE UR QL STRIP.AUTO: NEGATIVE
P-R INTERVAL, ECG05: 170 MS
PH UR STRIP: 6 [PH] (ref 5–8)
PLATELET # BLD AUTO: 175 K/UL (ref 150–400)
POTASSIUM SERPL-SCNC: 3.3 MMOL/L (ref 3.5–5.1)
PROT SERPL-MCNC: 7.5 G/DL (ref 6.4–8.2)
PROT UR STRIP-MCNC: 100 MG/DL
Q-T INTERVAL, ECG07: 390 MS
QRS DURATION, ECG06: 76 MS
QTC CALCULATION (BEZET), ECG08: 414 MS
RBC # BLD AUTO: 4.84 M/UL (ref 3.8–5.2)
RBC #/AREA URNS HPF: ABNORMAL /HPF (ref 0–5)
SODIUM SERPL-SCNC: 128 MMOL/L (ref 136–145)
SP GR UR REFRACTOMETRY: 1.02 (ref 1–1.03)
TROPONIN I SERPL-MCNC: <0.04 NG/ML
UROBILINOGEN UR QL STRIP.AUTO: 1 EU/DL (ref 0.2–1)
VENTRICULAR RATE, ECG03: 68 BPM
WBC # BLD AUTO: 6.4 K/UL (ref 3.6–11)
WBC URNS QL MICRO: ABNORMAL /HPF (ref 0–4)

## 2017-03-14 PROCEDURE — 93005 ELECTROCARDIOGRAM TRACING: CPT

## 2017-03-14 PROCEDURE — 96374 THER/PROPH/DIAG INJ IV PUSH: CPT

## 2017-03-14 PROCEDURE — 94640 AIRWAY INHALATION TREATMENT: CPT

## 2017-03-14 PROCEDURE — 85025 COMPLETE CBC W/AUTO DIFF WBC: CPT | Performed by: NURSE PRACTITIONER

## 2017-03-14 PROCEDURE — 80053 COMPREHEN METABOLIC PANEL: CPT | Performed by: NURSE PRACTITIONER

## 2017-03-14 PROCEDURE — 99285 EMERGENCY DEPT VISIT HI MDM: CPT

## 2017-03-14 PROCEDURE — 74011000250 HC RX REV CODE- 250: Performed by: NURSE PRACTITIONER

## 2017-03-14 PROCEDURE — 74011250636 HC RX REV CODE- 250/636: Performed by: NURSE PRACTITIONER

## 2017-03-14 PROCEDURE — 71020 XR CHEST PA LAT: CPT

## 2017-03-14 PROCEDURE — 96361 HYDRATE IV INFUSION ADD-ON: CPT

## 2017-03-14 PROCEDURE — 77030029684 HC NEB SM VOL KT MONA -A

## 2017-03-14 PROCEDURE — 36415 COLL VENOUS BLD VENIPUNCTURE: CPT | Performed by: NURSE PRACTITIONER

## 2017-03-14 PROCEDURE — 74011250637 HC RX REV CODE- 250/637: Performed by: NURSE PRACTITIONER

## 2017-03-14 PROCEDURE — 81001 URINALYSIS AUTO W/SCOPE: CPT | Performed by: NURSE PRACTITIONER

## 2017-03-14 PROCEDURE — 84484 ASSAY OF TROPONIN QUANT: CPT | Performed by: NURSE PRACTITIONER

## 2017-03-14 RX ORDER — BENZONATATE 200 MG/1
200 CAPSULE ORAL
Qty: 15 CAP | Refills: 0 | Status: SHIPPED | OUTPATIENT
Start: 2017-03-14 | End: 2017-03-21

## 2017-03-14 RX ORDER — KETOROLAC TROMETHAMINE 30 MG/ML
15 INJECTION, SOLUTION INTRAMUSCULAR; INTRAVENOUS
Status: COMPLETED | OUTPATIENT
Start: 2017-03-14 | End: 2017-03-14

## 2017-03-14 RX ORDER — DEXAMETHASONE 4 MG/1
10 TABLET ORAL
Status: COMPLETED | OUTPATIENT
Start: 2017-03-14 | End: 2017-03-14

## 2017-03-14 RX ORDER — ALBUTEROL SULFATE 90 UG/1
2 AEROSOL, METERED RESPIRATORY (INHALATION)
Status: DISCONTINUED | OUTPATIENT
Start: 2017-03-14 | End: 2017-03-14 | Stop reason: HOSPADM

## 2017-03-14 RX ADMIN — KETOROLAC TROMETHAMINE 15 MG: 30 INJECTION INTRAMUSCULAR; INTRAVENOUS at 12:39

## 2017-03-14 RX ADMIN — ALBUTEROL SULFATE 1 DOSE: 2.5 SOLUTION RESPIRATORY (INHALATION) at 13:19

## 2017-03-14 RX ADMIN — DEXAMETHASONE 10 MG: 4 TABLET ORAL at 14:23

## 2017-03-14 RX ADMIN — SODIUM CHLORIDE 1000 ML: 900 INJECTION, SOLUTION INTRAVENOUS at 13:21

## 2017-03-14 RX ADMIN — ALBUTEROL SULFATE 2 PUFF: 90 AEROSOL, METERED RESPIRATORY (INHALATION) at 14:45

## 2017-03-14 NOTE — ED TRIAGE NOTES
Arrives via EMS from home for bilateral flank pain and low rib pain that worsens with deep inspiration and cough. +green sputum. Denies smoking. +fever and chills +general malaise. +nasal congestion +sore throat   Had singles vaccination 2 weeks ago.

## 2017-03-14 NOTE — DISCHARGE INSTRUCTIONS
Recent Results (from the past 12 hour(s))   EKG, 12 LEAD, INITIAL    Collection Time: 03/14/17 11:57 AM   Result Value Ref Range    Ventricular Rate 68 BPM    Atrial Rate 68 BPM    P-R Interval 170 ms    QRS Duration 76 ms    Q-T Interval 390 ms    QTC Calculation (Bezet) 414 ms    Calculated P Axis 1 degrees    Calculated R Axis 37 degrees    Calculated T Axis 54 degrees    Diagnosis       Normal sinus rhythm  Nonspecific T wave abnormality  When compared with ECG of 04-SEP-2013 16:14,  No significant change was found     URINALYSIS W/MICROSCOPIC    Collection Time: 03/14/17 12:19 PM   Result Value Ref Range    Color YELLOW/STRAW      Appearance HAZY (A) CLEAR      Specific gravity 1.025 1.003 - 1.030      pH (UA) 6.0 5.0 - 8.0      Protein 100 (A) NEG mg/dL    Glucose >1000 (A) NEG mg/dL    Ketone NEGATIVE  NEG mg/dL    Blood NEGATIVE  NEG      Urobilinogen 1.0 0.2 - 1.0 EU/dL    Nitrites NEGATIVE  NEG      Leukocyte Esterase TRACE (A) NEG      Bilirubin UA, confirm NEGATIVE  NEG      WBC 0-4 0 - 4 /hpf    RBC 0-5 0 - 5 /hpf    Epithelial cells MODERATE (A) FEW /lpf    Bacteria NEGATIVE  NEG /hpf    Hyaline cast 2-5 0 - 5 /lpf   CBC WITH AUTOMATED DIFF    Collection Time: 03/14/17 12:37 PM   Result Value Ref Range    WBC 6.4 3.6 - 11.0 K/uL    RBC 4.84 3.80 - 5.20 M/uL    HGB 12.6 11.5 - 16.0 g/dL    HCT 39.0 35.0 - 47.0 %    MCV 80.6 80.0 - 99.0 FL    MCH 26.0 26.0 - 34.0 PG    MCHC 32.3 30.0 - 36.5 g/dL    RDW 13.2 11.5 - 14.5 %    PLATELET 751 352 - 619 K/uL    NEUTROPHILS 74 32 - 75 %    LYMPHOCYTES 16 12 - 49 %    MONOCYTES 10 5 - 13 %    EOSINOPHILS 0 0 - 7 %    BASOPHILS 0 0 - 1 %    ABS. NEUTROPHILS 4.7 1.8 - 8.0 K/UL    ABS. LYMPHOCYTES 1.0 0.8 - 3.5 K/UL    ABS. MONOCYTES 0.7 0.0 - 1.0 K/UL    ABS. EOSINOPHILS 0.0 0.0 - 0.4 K/UL    ABS.  BASOPHILS 0.0 0.0 - 0.1 K/UL   METABOLIC PANEL, COMPREHENSIVE    Collection Time: 03/14/17 12:37 PM   Result Value Ref Range    Sodium 128 (L) 136 - 145 mmol/L Potassium 3.3 (L) 3.5 - 5.1 mmol/L    Chloride 94 (L) 97 - 108 mmol/L    CO2 26 21 - 32 mmol/L    Anion gap 8 5 - 15 mmol/L    Glucose 299 (H) 65 - 100 mg/dL    BUN 9 6 - 20 MG/DL    Creatinine 0.94 0.55 - 1.02 MG/DL    BUN/Creatinine ratio 10 (L) 12 - 20      GFR est AA >60 >60 ml/min/1.73m2    GFR est non-AA 60 (L) >60 ml/min/1.73m2    Calcium 8.3 (L) 8.5 - 10.1 MG/DL    Bilirubin, total 0.2 0.2 - 1.0 MG/DL    ALT (SGPT) 24 12 - 78 U/L    AST (SGOT) 21 15 - 37 U/L    Alk. phosphatase 103 45 - 117 U/L    Protein, total 7.5 6.4 - 8.2 g/dL    Albumin 3.5 3.5 - 5.0 g/dL    Globulin 4.0 2.0 - 4.0 g/dL    A-G Ratio 0.9 (L) 1.1 - 2.2     TROPONIN I    Collection Time: 03/14/17 12:37 PM   Result Value Ref Range    Troponin-I, Qt. <0.04 <0.05 ng/mL          Influenza like illness (Flu): Care Instructions  Your Care Instructions  Influenza (flu) is an infection in the lungs and breathing passages. It is caused by the influenza virus. There are different strains, or types, of the flu virus from year to year. Unlike the common cold, the flu comes on suddenly and the symptoms, such as a cough, congestion, fever, chills, fatigue, aches, and pains, are more severe. These symptoms may last up to 10 days. Although the flu can make you feel very sick, it usually doesn't cause serious health problems. Home treatment is usually all you need for flu symptoms. But your doctor may prescribe antiviral medicine to prevent other health problems, such as pneumonia, from developing. Older people and those who have a long-term health condition, such as lung disease, are most at risk for having pneumonia or other health problems. Follow-up care is a key part of your treatment and safety. Be sure to make and go to all appointments, and call your doctor if you are having problems. Its also a good idea to know your test results and keep a list of the medicines you take. How can you care for yourself at home?   · Get plenty of rest.  · Drink plenty of fluids, enough so that your urine is light yellow or clear like water. If you have kidney, heart, or liver disease and have to limit fluids, talk with your doctor before you increase the amount of fluids you drink. · Take an over-the-counter pain medicine if needed, such as acetaminophen (Tylenol), ibuprofen (Advil, Motrin), or naproxen (Aleve), to relieve fever, headache, and muscle aches. Read and follow all instructions on the label. No one younger than 20 should take aspirin. It has been linked to Reye syndrome, a serious illness. · Do not smoke. Smoking can make the flu worse. If you need help quitting, talk to your doctor about stop-smoking programs and medicines. These can increase your chances of quitting for good. · Breathe moist air from a hot shower or from a sink filled with hot water to help clear a stuffy nose. · Before you use cough and cold medicines, check the label. These medicines may not be safe for young children or for people with certain health problems. · If the skin around your nose and lips becomes sore, put some petroleum jelly on the area. · To ease coughing:  ¨ Drink fluids to soothe a scratchy throat. ¨ Suck on cough drops or plain hard candy. ¨ Take an over-the-counter cough medicine that contains dextromethorphan to help you get some sleep. Read and follow all instructions on the label. ¨ Raise your head at night with an extra pillow. This may help you rest if coughing keeps you awake. · Take any prescribed medicine exactly as directed. Call your doctor if you think you are having a problem with your medicine. To avoid spreading the flu  · Wash your hands regularly, and keep your hands away from your face. · Stay home from school, work, and other public places until you are feeling better and your fever has been gone for at least 24 hours. The fever needs to have gone away on its own without the help of medicine.   · Ask people living with you to talk to their doctors about preventing the flu. They may get antiviral medicine to keep from getting the flu from you. · To prevent the flu in the future, get a flu vaccine every fall. Encourage people living with you to get the vaccine. · Cover your mouth when you cough or sneeze. When should you call for help? Call 911 anytime you think you may need emergency care. For example, call if:  · You have severe trouble breathing. Call your doctor now or seek immediate medical care if:  · You have new or worse trouble breathing. · You seem to be getting much sicker. · You feel very sleepy or confused. · You have a new or higher fever. · You get a new rash. Watch closely for changes in your health, and be sure to contact your doctor if:  · You begin to get better and then get worse. · You are not getting better after 1 week. Where can you learn more? Go to http://addis-johnathon.info/. Enter M763 in the search box to learn more about \"Influenza (Flu): Care Instructions. \"  Current as of: May 23, 2016  Content Version: 11.1  © 0941-3295 [employer], Incorporated. Care instructions adapted under license by [employer] (which disclaims liability or warranty for this information). If you have questions about a medical condition or this instruction, always ask your healthcare professional. [address] any warranty or liability for your use of this information.

## 2017-03-14 NOTE — Clinical Note
Return to the ER if worsening symptoms or concerns. Use the inhaler 2 puffs every 4 hours as needed for cough/shortness of breath. Return if worsening symptoms or any concerns as discussed. Close follow up with Dr. Iam Smith this week for reevaluation inc luding recheck of sodium level. Push fluids including gatorade or G2 as discussed. We hope that we have addressed all of your medical concerns. The examination and treatment you received in the Emergency Department were for an emergent problem  and were not intended as complete care. It is important that you follow up with your healthcare provider(s) for ongoing care. If your symptoms worsen or do not improve as expected, and you are unable to reach your usual health care provider(s), you shou ld return to the Emergency Department. Today's healthcare is undergoing tremendous change, and patient satisfaction surveys are one of the many tools to assess the quality of medical care. You may receive a survey from the [employer]  regarding your experience in the Emergency Department. I hope that your experience has been completely positive, particularly the medical care that I provided. As such, please participate in the survey; anything less than excellent does not meet my exp ectations or intentions. [facility] and [facility] participate in nationally recognized quality of care measures. If your blood pressure is greater than 120/80, as reported below, we urge that you seek med [facility] care to address the potential of high blood pressure, commonly known as hypertension. Hypertension can be hereditary or can be caused by certain medical conditions, pain, stress, or \"white coat syndrome. \" Please make an appointment with your health care provider(s) for follow up of your Emergency Department visit. VITALS:  
Empty flowsheet group. Thank you for allowing us to provide you with medical care today. We realize that you have many choices for your emergency care edwardo borrero. Please choose us in the future for any continued health care needs. Caro Tellez NP [address] [facility].  
Office: [phone] @[email or web address](24h)@ [unfilled]

## 2017-03-14 NOTE — ED NOTES
Pt given discharge instructions, patient education, prescriptions sent to pharmacy, and follow up information. Pt states understanding. All questions answered. Pt discharged to home in private vehicle, ambulatory. Pt A/Ox4, RA, pain controlled.

## 2017-03-14 NOTE — ED PROVIDER NOTES
HPI Comments: 61 y.o. female with past medical history significant for diabetes, HTN, MS, Hep C, stroke, and GERD who presents via EMS with chief complaint of cough. Pt states that three days ago she onset of nasal congestion. She then started coughing and states that she has had a productive cough for the past 2-3 days. She states that she has had forceful coughing fits and notes that after the coughing she has had onset of pain to the bilateral lower ribs that is worse with coughing, breathing,a nd movement. She states that 2 days ago she felt subjectively febrile but did not take her temperature. She also noted nausea 2 days ago and notes myalgia. She also notes chills, urinary frequency. She denies hemoptysis. She denies any vomiting, diarrhea, or recent long travel. She has a history of bronchitis and has previously used a nebulizer when she had pneumonia but says that she ran out of her nebulizer and has not been using any medications for her current symptoms. There are no other acute medical concerns at this time. Social hx: Denies tobacco use  PCP: Kai Chris DO    Note written by Susan Jean Baptiste, as dictated by Moi French NP 12:40 PM          The history is provided by the patient. No  was used. Past Medical History:   Diagnosis Date    Arthritis     OSTEO A & OSTEOPOROSIS     Depression     Diabetes ([facility] [address]     GERD (gastroesophageal reflux disease)     Hepatitis C     Hypertension     LBP (low back pain)     Lipoma 9/3/2013    Liver disease     CHRONIC HEP C    MS (multiple sclerosis) (HCC)     Other ill-defined conditions(799.89)     MS    Other ill-defined conditions(799.89)     HX DRUG ABUSE. CLEAN FOR 21 YRS.  Psychiatric disorder     DEPRESSION.     Stroke [facility])     Diagnosed 10/2011    Urinary incontinence        Past Surgical History:   Procedure Laterality Date    HX GI      COLONOSCOPY X 1    HX HYSTERECTOMY      HX LIPOMA RESECTION  9/6/13     Excision of lipoma, left posterior thigh         Family History:   Problem Relation Age of Onset   Alvarez Cords Arthritis-rheumatoid Mother     Hypertension Mother     Cancer Father     Breast Cancer Maternal Aunt     Breast Cancer Maternal Aunt        Social History     Social History    Marital status: SINGLE     Spouse name: N/A    Number of children: N/A    Years of education: N/A     Occupational History    Not on file. Social History Main Topics    Smoking status: Never Smoker    Smokeless tobacco: Never Used    Alcohol use No    Drug use: No    Sexual activity: No     Other Topics Concern    Not on file     Social History Narrative         ALLERGIES: Pcn [penicillins]; Fosamax [alendronate]; and Sulfa (sulfonamide antibiotics)    Review of Systems   Constitutional: Positive for chills and fever. HENT: Positive for congestion. Respiratory: Positive for cough and shortness of breath. Cardiovascular: Positive for chest pain. Gastrointestinal: Positive for nausea. Negative for diarrhea and vomiting. Genitourinary: Positive for frequency. Musculoskeletal: Positive for myalgias. Allergic/Immunologic: Negative for immunocompromised state. All other systems reviewed and are negative. Vitals:    03/14/17 1200   BP: 150/85   Pulse: 68   Resp: 18   Temp: 98.7 °F (37.1 °C)   SpO2: 94%   Weight: 77.1 kg (170 lb)   Height: 5' 3\" (1.6 m)            Physical Exam   Constitutional: She is oriented to person, place, and time. She appears well-developed and well-nourished. Coughing intermittently; braces lower chest wall with coughing  Non toxic    HENT:   Head: Normocephalic and atraumatic. Eyes: Conjunctivae are normal. Right eye exhibits no discharge. Left eye exhibits no discharge. Neck: Normal range of motion. Neck supple. Cardiovascular: Normal rate, regular rhythm and normal heart sounds. Exam reveals no gallop and no friction rub.     No murmur heard.  Pulmonary/Chest: Effort normal and breath sounds normal. No respiratory distress. She has no wheezes. She has no rales. She exhibits tenderness. Diminished bilaterally, coughing  Inferior chest wall tenderness   Abdominal: Soft. She exhibits no distension. There is no tenderness. There is no rebound and no guarding. Musculoskeletal: Normal range of motion. She exhibits no edema or tenderness. Calves soft and non tender  No edema   Neurological: She is alert and oriented to person, place, and time. Skin: Skin is warm and dry. She is not diaphoretic. Psychiatric: She has a normal mood and affect. Her behavior is normal. Judgment and thought content normal.   Nursing note and vitals reviewed. MDM  Number of Diagnoses or Management Options  Influenza-like illness:      Amount and/or Complexity of Data Reviewed  Clinical lab tests: ordered and reviewed  Tests in the radiology section of CPT®: ordered and reviewed  Discuss the patient with other providers: yes  Independent visualization of images, tracings, or specimens: yes      ED Course       Procedures      62 y/o female presents with congestion and then cough, myalgia, fever, and now with rib discomfort with coughing and moving, c/w musculoskeletal pain. Sx c/w influenza like illness. Pt afebrile here and is non toxic. CXR no acute process. Pt given duoneb tx here and reports subjective improvement with improved aeration on exam.  She is in no respiratory distress and is stable for discharge home. Na 128 noted - pt given IVF - instructed to drink gatorade and need for recheck with PCP this week. No PE risk factors, sx are c/w influenza like illness with musculoskeletal chest wall discomfort d/t coughing. Attempted to call PCP to set up appt for f/u but  could not get through. Pt instructed to f/u in 2 days, return here if worse, instructed will need to recheck Na. She demonstrates understanding.    Pt discussed with ED attending Dr. Yoli Weston who also evaluated the patient and agrees with ED evaluation and disposition. EKG interpretation: sinus rhythm, rate 68, nonspecific T wave abnormality  Akila Gandhi NP    XR Results (most recent):    Results from Hospital Encounter encounter on 03/14/17   XR CHEST PA LAT   Narrative Exam:  2 view chest    Indication: Cough    Comparison to 4/21/2015. PA and lateral views demonstrate normal heart size. There is no acute process in  the lung fields. The osseous structures are unremarkable.          Impression Impression: No acute process or change compared to the prior exam.

## 2017-03-23 ENCOUNTER — HOSPITAL ENCOUNTER (EMERGENCY)
Age: 67
Discharge: HOME | End: 2017-03-23
Attending: EMERGENCY MEDICINE

## 2017-03-23 VITALS
WEIGHT: 163 LBS | HEIGHT: 63 IN | TEMPERATURE: 98.3 F | DIASTOLIC BLOOD PRESSURE: 88 MMHG | RESPIRATION RATE: 18 BRPM | HEART RATE: 75 BPM | BODY MASS INDEX: 28.88 KG/M2 | SYSTOLIC BLOOD PRESSURE: 164 MMHG | OXYGEN SATURATION: 97 %

## 2017-03-23 DIAGNOSIS — J20.9 ACUTE BRONCHITIS, UNSPECIFIED ORGANISM: Primary | ICD-10-CM

## 2017-03-23 RX ORDER — AZITHROMYCIN 250 MG/1
TABLET, FILM COATED ORAL
Qty: 6 TAB | Refills: 0 | Status: SHIPPED | OUTPATIENT
Start: 2017-03-23 | End: 2017-07-14 | Stop reason: ALTCHOICE

## 2017-03-23 RX ORDER — CODEINE PHOSPHATE AND GUAIFENESIN 10; 100 MG/5ML; MG/5ML
5 SOLUTION ORAL
Qty: 120 ML | Refills: 0 | Status: SHIPPED | OUTPATIENT
Start: 2017-03-23 | End: 2017-07-14 | Stop reason: ALTCHOICE

## 2017-03-23 NOTE — DISCHARGE INSTRUCTIONS
Bronchitis: Care Instructions  Your Care Instructions    Bronchitis is inflammation of the bronchial tubes, which carry air to the lungs. The tubes swell and produce mucus, or phlegm. The mucus and inflamed bronchial tubes make you cough. You may have trouble breathing. Most cases of bronchitis are caused by viruses like those that cause colds. Antibiotics usually do not help and they may be harmful. Bronchitis usually develops rapidly and lasts about 2 to 3 weeks in otherwise healthy people. Follow-up care is a key part of your treatment and safety. Be sure to make and go to all appointments, and call your doctor if you are having problems. It's also a good idea to know your test results and keep a list of the medicines you take. How can you care for yourself at home? · Take all medicines exactly as prescribed. Call your doctor if you think you are having a problem with your medicine. · Get some extra rest.  · Take an over-the-counter pain medicine, such as acetaminophen (Tylenol), ibuprofen (Advil, Motrin), or naproxen (Aleve) to reduce fever and relieve body aches. Read and follow all instructions on the label. · Do not take two or more pain medicines at the same time unless the doctor told you to. Many pain medicines have acetaminophen, which is Tylenol. Too much acetaminophen (Tylenol) can be harmful. · Take an over-the-counter cough medicine that contains dextromethorphan to help quiet a dry, hacking cough so that you can sleep. Avoid cough medicines that have more than one active ingredient. Read and follow all instructions on the label. · Breathe moist air from a humidifier, hot shower, or sink filled with hot water. The heat and moisture will thin mucus so you can cough it out. · Do not smoke. Smoking can make bronchitis worse. If you need help quitting, talk to your doctor about stop-smoking programs and medicines. These can increase your chances of quitting for good.   When should you call for help? Call 911 anytime you think you may need emergency care. For example, call if:  · You have severe trouble breathing. Call your doctor now or seek immediate medical care if:  · You have new or worse trouble breathing. · You cough up dark brown or bloody mucus (sputum). · You have a new or higher fever. · You have a new rash. Watch closely for changes in your health, and be sure to contact your doctor if:  · You cough more deeply or more often, especially if you notice more mucus or a change in the color of your mucus. · You are not getting better as expected. Where can you learn more? Go to http://addis-johnathon.info/. Enter H333 in the search box to learn more about \"Bronchitis: Care Instructions. \"  Current as of: May 23, 2016  Content Version: 11.1  © 9203-9282 [employer]. Care instructions adapted under license by [employer] (which disclaims liability or warranty for this information). If you have questions about a medical condition or this instruction, always ask your healthcare professional. [address] any warranty or liability for your use of this information.

## 2017-03-23 NOTE — UC PROVIDER NOTE
Patient is a 61 y.o. female presenting with cold symptoms. The history is provided by the patient. Cold Symptoms    This is a new problem. Episode onset: Over 2 weeks ago. The problem has been gradually worsening. There has been no fever. Associated symptoms include congestion and cough. Pertinent negatives include no chest pain, no abdominal pain, no ear pain, no headaches, no rhinorrhea and no sore throat. Past Medical History:   Diagnosis Date    Arthritis     OSTEO A & OSTEOPOROSIS     Depression     Diabetes ([address]     GERD (gastroesophageal reflux disease)     Hepatitis C     Hypertension     LBP (low back pain)     Lipoma 9/3/2013    Liver disease     CHRONIC HEP C    MS (multiple sclerosis) (HCC)     Other ill-defined conditions(799.89)     MS    Other ill-defined conditions(799.89)     HX DRUG ABUSE. CLEAN FOR 21 YRS.  Psychiatric disorder     DEPRESSION.  Stroke [facility])     Diagnosed 10/2011    Urinary incontinence         Past Surgical History:   Procedure Laterality Date    HX GI      COLONOSCOPY X 1    HX HYSTERECTOMY      HX LIPOMA RESECTION  9/6/13     Excision of lipoma, left posterior thigh         Family History   Problem Relation Age of Onset    Arthritis-rheumatoid Mother     Hypertension Mother     Cancer Father     Breast Cancer Maternal Aunt     Breast Cancer Maternal Aunt         Social History     Social History    Marital status: SINGLE     Spouse name: N/A    Number of children: N/A    Years of education: N/A     Occupational History    Not on file. Social History Main Topics    Smoking status: Never Smoker    Smokeless tobacco: Never Used    Alcohol use No    Drug use: No    Sexual activity: No     Other Topics Concern    Not on file     Social History Narrative                ALLERGIES: Pcn [penicillins];  Fosamax [alendronate]; and Sulfa (sulfonamide antibiotics)    Review of Systems   Constitutional: Negative for activity change, chills and fever. HENT: Positive for congestion. Negative for ear pain, rhinorrhea and sore throat. Respiratory: Positive for cough. Cardiovascular: Negative for chest pain. Gastrointestinal: Negative for abdominal pain. Neurological: Negative for headaches. Vitals:    03/23/17 0945   BP: 164/88   Pulse: 75   Resp: 18   Temp: 98.3 °F (36.8 °C)   SpO2: 97%   Weight: 73.9 kg (163 lb)   Height: 5' 3\" (1.6 m)       Physical Exam   Constitutional: She is oriented to person, place, and time. She appears well-developed and well-nourished. HENT:   Right Ear: External ear normal.   Left Ear: External ear normal.   Eyes: EOM are normal.   Neck: Normal range of motion. Neck supple. No JVD present. No tracheal deviation present. No thyromegaly present. Cardiovascular: Normal rate and regular rhythm. Pulmonary/Chest: Effort normal and breath sounds normal. No respiratory distress. She has no wheezes. Lymphadenopathy:     She has no cervical adenopathy. Neurological: She is alert and oriented to person, place, and time. Skin: Skin is warm and dry. Psychiatric: She has a normal mood and affect. Her behavior is normal. Judgment and thought content normal.   Nursing note and vitals reviewed. MDM     Differential Diagnosis; Clinical Impression; Plan:     CLINICAL IMPRESSION:  Acute bronchitis, unspecified organism  (primary encounter diagnosis)    Plan:  1. Zithromax  2. Robitussin AC  3. Risk of Significant Complications, Morbidity, and/or Mortality:   Presenting problems: Moderate  Diagnostic procedures: Moderate  Management options:   Moderate  Progress:   Patient progress:  Stable      Procedures

## 2017-04-30 DIAGNOSIS — E11.9 TYPE 2 DIABETES MELLITUS WITHOUT COMPLICATION, WITHOUT LONG-TERM CURRENT USE OF INSULIN (HCC): ICD-10-CM

## 2017-05-01 DIAGNOSIS — E11.9 TYPE 2 DIABETES MELLITUS WITHOUT COMPLICATION, WITHOUT LONG-TERM CURRENT USE OF INSULIN (HCC): ICD-10-CM

## 2017-05-31 RX ORDER — METOPROLOL SUCCINATE 200 MG/1
TABLET, EXTENDED RELEASE ORAL
Qty: 30 TAB | Refills: 5 | Status: SHIPPED | OUTPATIENT
Start: 2017-05-31 | End: 2017-11-12 | Stop reason: SDUPTHER

## 2017-07-14 ENCOUNTER — OFFICE VISIT (OUTPATIENT)
Dept: INTERNAL MEDICINE CLINIC | Age: 67
End: 2017-07-14

## 2017-07-14 VITALS
SYSTOLIC BLOOD PRESSURE: 147 MMHG | DIASTOLIC BLOOD PRESSURE: 87 MMHG | OXYGEN SATURATION: 95 % | BODY MASS INDEX: 28.88 KG/M2 | HEIGHT: 63 IN | HEART RATE: 61 BPM | RESPIRATION RATE: 28 BRPM | WEIGHT: 163 LBS | TEMPERATURE: 97.1 F

## 2017-07-14 DIAGNOSIS — M81.6 LOCALIZED OSTEOPOROSIS, UNSPECIFIED PATHOLOGICAL FRACTURE PRESENCE: ICD-10-CM

## 2017-07-14 DIAGNOSIS — G35 MS (MULTIPLE SCLEROSIS) (HCC): ICD-10-CM

## 2017-07-14 DIAGNOSIS — M06.9 RHEUMATOID ARTHRITIS, INVOLVING UNSPECIFIED SITE, UNSPECIFIED RHEUMATOID FACTOR PRESENCE: ICD-10-CM

## 2017-07-14 DIAGNOSIS — I10 ESSENTIAL HYPERTENSION: ICD-10-CM

## 2017-07-14 DIAGNOSIS — F51.01 PRIMARY INSOMNIA: ICD-10-CM

## 2017-07-14 DIAGNOSIS — B18.2 HEP C W/O COMA, CHRONIC (HCC): ICD-10-CM

## 2017-07-14 DIAGNOSIS — E11.9 TYPE 2 DIABETES MELLITUS WITHOUT COMPLICATION, WITHOUT LONG-TERM CURRENT USE OF INSULIN (HCC): Primary | ICD-10-CM

## 2017-07-14 RX ORDER — RISEDRONATE SODIUM 150 MG/1
150 TABLET, FILM COATED ORAL
Qty: 3 TAB | Refills: 3 | Status: SHIPPED | OUTPATIENT
Start: 2017-07-14 | End: 2018-04-30 | Stop reason: SDUPTHER

## 2017-07-14 RX ORDER — ZOLPIDEM TARTRATE 5 MG/1
5 TABLET ORAL
Qty: 30 TAB | Refills: 0 | Status: SHIPPED | OUTPATIENT
Start: 2017-07-14 | End: 2019-04-04

## 2017-07-14 RX ORDER — ESOMEPRAZOLE MAGNESIUM 20 MG/1
TABLET ORAL
Refills: 2 | COMMUNITY
Start: 2017-06-27 | End: 2017-10-20 | Stop reason: SDUPTHER

## 2017-07-14 RX ORDER — AMMONIUM LACTATE 12 G/100G
LOTION TOPICAL
Refills: 5 | COMMUNITY
Start: 2017-06-15 | End: 2017-12-29

## 2017-07-14 RX ORDER — INSULIN GLARGINE 100 [IU]/ML
INJECTION, SOLUTION SUBCUTANEOUS
Qty: 1 PEN | Refills: 5 | Status: SHIPPED | OUTPATIENT
Start: 2017-07-14 | End: 2017-10-25 | Stop reason: SDUPTHER

## 2017-07-14 RX ORDER — MICONAZOLE NITRATE 2 %
1 CREAM WITH APPLICATOR VAGINAL DAILY
COMMUNITY
Start: 2017-05-12 | End: 2018-06-12 | Stop reason: SDUPTHER

## 2017-07-14 RX ORDER — RISEDRONATE SODIUM 35 MG/1
TABLET, FILM COATED ORAL
Refills: 1 | COMMUNITY
Start: 2017-06-03 | End: 2017-07-14 | Stop reason: DRUGHIGH

## 2017-07-14 NOTE — PROGRESS NOTES
Chief Complaint   Patient presents with    Finger Pain     pain in fingers both hands (pain level 8/10)    Medication Refill     going to [facility] X 2 months    Osteoporosis     discuss medication    Sleep Problem     Reviewed record  In preparation for visit and have obtained necessary documentation. 1. Have you been to the ER, urgent care clinic since your last visit? Hospitalized since your last visit? No    2. Have you seen or consulted any other health care providers outside of the [address] [place] [address] since your last visit? Include any pap smears or colon screening.  No      Used 2 patient I. D. 's

## 2017-07-14 NOTE — MR AVS SNAPSHOT
Visit Information Date & Time Provider Department Dept. Phone Encounter #  
 7/14/2017  1:00 PM Lacie Garcia, [address] [facility] Internal Medicine [phone] [id number] Follow-up Instructions Return in about 3 months (around 10/14/2017). Your Appointments 9/11/2017  2:40 PM  
Follow Up with Sabina Garibay DO [facility] at [facility] Appt Note: 3 month f/u MS 2/7/17; R/S,  [id number] MS Dr. Deana De La Cruz [address] [place] [address] Drive [address] [address]  
[phone]  
  
   
 [address] [address] Upcoming Health Maintenance Date Due  
 EYE EXAM RETINAL OR DILATED Q1 7/18/1962 COLONOSCOPY 7/18/1970 DTaP/Tdap/Td series (1 - Tdap) 7/18/1973 ZOSTER VACCINE AGE 60> 7/18/2012 MICROALBUMIN Q1 6/23/2016 INFLUENZA AGE 9 TO ADULT 8/1/2017 FOOT EXAM Q1 10/25/2017 HEMOGLOBIN A1C Q6M 12/19/2017 LIPID PANEL Q1 6/19/2018 BREAST CANCER SCRN MAMMOGRAM 10/17/2018 PAP AKA CERVICAL CYTOLOGY 10/24/2019 Allergies as of 7/14/2017  Review Complete On: 7/14/2017 By: Lacie Garcia DO Severity Noted Reaction Type Reactions Pcn [Penicillins] High 02/02/2010   Intolerance Swelling Fosamax [Alendronate] Medium 02/02/2010   Intolerance Swelling Sulfa (Sulfonamide Antibiotics) Low 02/02/2010   Topical Itching Current Immunizations  Reviewed on 10/25/2016 Name Date Influenza Nasal Vaccine 9/25/2016 Pneumococcal Polysaccharide (PPSV-23) 1/4/2015 10:52 AM  
  
 Not reviewed this visit You Were Diagnosed With   
  
 Codes Comments Type 2 diabetes mellitus without complication, without long-term current use of insulin (HCC)    -  Primary ICD-10-CM: E11.9 ICD-9-CM: 250.00   
 MS (multiple sclerosis) ([facility])     ICD-10-CM: G35 
ICD-9-CM: 041  Rheumatoid arthritis, involving unspecified site, unspecified rheumatoid factor presence ([facility] [address]     ICD-10-CM: M06.9 ICD-9-CM: 714.0 Hep C w/o coma, chronic (HCC)     ICD-10-CM: B18.2 ICD-9-CM: 070.54 Essential hypertension     ICD-10-CM: I10 
ICD-9-CM: 401.9 Localized osteoporosis, unspecified pathological fracture presence     ICD-10-CM: M81.6 ICD-9-CM: 733.09 Primary insomnia     ICD-10-CM: F51.01 
ICD-9-CM: 307.42 Vitals BP Pulse Temp Resp Height(growth percentile) Weight(growth percentile) 147/87 61 97.1 °F (36.2 °C) (Oral) 28 5' 3\" (1.6 m) 163 lb (73.9 kg) SpO2 BMI OB Status Smoking Status 95% 28.87 kg/m2 Hysterectomy Never Smoker Vitals History BMI and BSA Data Body Mass Index Body Surface Area  
 28.87 kg/m 2 1.81 m 2 Preferred Pharmacy Pharmacy Name Phone [id number], [id number] [place] [address] [phone] Your Updated Medication List  
  
   
This list is accurate as of: 7/14/17  1:25 PM.  Always use your most recent med list. amLODIPine 10 mg tablet Commonly known as:  Erenest Anselmo TAKE 1 TABLET BY MOUTH EVERY DAY  
  
 ammonium lactate 12 % lotion Commonly known as:  LAC-HYDRIN  
APPLY TO AFFECTED AREA AND RUB IN TO IT WELL  
  
 AVONEX 30 mcg/0.5 mL Pnkt Generic drug:  interferon beta-1a  
0.5 mL by IntraMUSCular route every seven (7) days. Blood-Glucose Meter Misc Commonly known as:  ACCU-CHEK ALONSO PLUS METER Test Blood Sugars as directed  
  
 calcium citrate-vitamin D3 tablet Commonly known as:  CITRACAL WITH VITAMIN D MAXIMUM  
  
 citalopram 20 mg tablet Commonly known as:  CELEXA  
TAKE 1 TABLET BY MOUTH EVERY DAY  
  
 cloNIDine HCl 0.1 mg tablet Commonly known as:  CATAPRES  
TAKE 1 TABLET BY MOUTH TWICE A DAY  
  
 glipiZIDE 10 mg tablet Commonly known as:  GLUCOTROL  
TAKE 1 TAB BY MOUTH BEFORE BREAKFAST AND DINNER. glucose blood VI test strips strip Commonly known as:  CONTOUR NEXT STRIPS  
 Test blood sugar twice daily. insulin glargine 100 unit/mL (3 mL) Inpn Commonly known as:  LANTUS,BASAGLAR  
10 units at bedtime * Lancets Misc Test blood sugar as directed * ULTRA THIN LANCETS 30 gauge Misc Generic drug:  lancets USE TO TEST 3 TIMES DAILY OR AS DIRECTED  
  
 metoprolol succinate 200 mg XL tablet Commonly known as:  TOPROL-XL  
TAKE 1 TABLET BY MOUTH EVERY DAY NexIUM 24HR 20 mg Tbec Generic drug:  esomeprazole magnesium TAKE 1 TAB BY MOUTH DAILY. risedronate 150 mg tablet Commonly known as:  ACTONEL Take 1 Tab by mouth every thirty (30) days. SITagliptin 100 mg tablet Commonly known as:  Cresencio Ruths TAKE 1 TABLET BY MOUTH DAILY  
  
 zolpidem 5 mg tablet Commonly known as:  AMBIEN Take 1 Tab by mouth nightly as needed for Sleep. * Notice: This list has 2 medication(s) that are the same as other medications prescribed for you. Read the directions carefully, and ask your doctor or other care provider to review them with you. Prescriptions Printed Refills  
 zolpidem (AMBIEN) 5 mg tablet 0 Sig: Take 1 Tab by mouth nightly as needed for Sleep. Class: Print Route: Oral  
  
Prescriptions Sent to Pharmacy Refills  
 insulin glargine (LANTUS,BASAGLAR) 100 unit/mL (3 mL) inpn 5 Sig: 10 units at bedtime Class: Normal  
 Pharmacy: [facility]/pharmacy #[address] [place] [address] Ph #: [phone]  
 risedronate (ACTONEL) 150 mg tablet 3 Sig: Take 1 Tab by mouth every thirty (30) days. Class: Normal  
 Pharmacy: [address] [place] [address], [address] [place] [address] Ph #: [phone] Route: Oral  
  
Follow-up Instructions Return in about 3 months (around 10/14/2017). To-Do List   
 10/12/2017 Lab:  ALT   
  
 10/12/2017 Lab:  AST   
  
 10/12/2017 Lab:  LIPID PANEL   
  
 10/12/2017   Lab:  METABOLIC PANEL, BASIC   
  
 10/14/2017 Lab:  HEMOGLOBIN A1C WITH EAG Introducing [facility]! [facility] introduces [employer] patient portal. Now you can access parts of your medical record, email your doctor's office, and request medication refills online. 1. In your internet browser, go to [email or web address] 2. Click on the First Time User? Click Here link in the Sign In box. You will see the New Member Sign Up page. 3. Enter your [employer] Access Code exactly as it appears below. You will not need to use this code after youve completed the sign-up process. If you do not sign up before the expiration date, you must request a new code. · [employer] Access Code: [email or web address] Expires: 10/12/2017  1:12 PM 
 
4. Enter the last four digits of your Social Security Number (xxxx) and Date of Birth (mm/dd/yyyy) as indicated and click Submit. You will be taken to the next sign-up page. 5. Create a [employer] ID. This will be your [employer] login ID and cannot be changed, so think of one that is secure and easy to remember. 6. Create a [employer] password. You can change your password at any time. 7. Enter your Password Reset Question and Answer. This can be used at a later time if you forget your password. 8. Enter your e-mail address. You will receive e-mail notification when new information is available in [address] 9. Click Sign Up. You can now view and download portions of your medical record. 10. Click the Download Summary menu link to download a portable copy of your medical information. If you have questions, please visit the Frequently Asked Questions section of the [employer] website. Remember, [employer] is NOT to be used for urgent needs. For medical emergencies, dial 911. Now available from your iPhone and Android! Please provide this summary of care documentation to your next provider. Your primary care clinician is listed as Tomás Villaseñor.  If you have any questions after today's visit, please call [phone].

## 2017-07-30 NOTE — PROGRESS NOTES
HISTORY OF PRESENT ILLNESS  Ira Lind is a 74 y.o. female. Pt. comes in for f/u. Has multiple medical problems. C/o pain in her hands and fingers. No trauma. H/o MS. Followed by neurologist. Also h/o RA but has not seen rheumatologist in along time. Her DM is out of control. Recent A1c was 9.6. Lipids are high. Reports compliance with medications but not her diet. Also h/o Hep c and osteoporosis. Has chronic depression,anxiety and insomnia. She is going out of town for a few weeks. Med list and most recent labs/studies reviewed with pt. Not very active physically to control weight. Needs med refills. Asking for pain meds. Reports no other new c/o. Finger Pain   Associated symptoms include headaches. Pertinent negatives include no chest pain, no abdominal pain and no shortness of breath. Medication Refill   Associated symptoms include headaches. Pertinent negatives include no chest pain, no abdominal pain and no shortness of breath. Osteoporosis   Associated symptoms include headaches. Pertinent negatives include no chest pain, no abdominal pain and no shortness of breath. Sleep Problem   Associated symptoms include headaches. Pertinent negatives include no chest pain, no abdominal pain and no shortness of breath. Hypertension    Associated symptoms include headaches. Pertinent negatives include no chest pain, no blurred vision and no shortness of breath. Review of Systems   Constitutional: Negative. Eyes: Negative for blurred vision. Respiratory: Negative for shortness of breath. Cardiovascular: Negative for chest pain and leg swelling. Gastrointestinal: Positive for heartburn. Negative for abdominal pain. Genitourinary: Negative for dysuria. Musculoskeletal: Positive for joint pain. Negative for falls. Skin: Negative for rash. Neurological: Positive for tingling, sensory change and headaches. Negative for focal weakness. Endo/Heme/Allergies: Negative for polydipsia. Psychiatric/Behavioral: Positive for depression. The patient is nervous/anxious and has insomnia. All other systems reviewed and are negative. Physical Exam   Constitutional: She is oriented to person, place, and time. She appears well-developed and well-nourished. No distress. overweight   HENT:   Head: Normocephalic and atraumatic. Nose: Nose normal.   Mouth/Throat: No oropharyngeal exudate. Eyes: Conjunctivae are normal. Pupils are equal, round, and reactive to light. Neck: Normal range of motion. Neck supple. No JVD present. No thyromegaly present. Cardiovascular: Normal rate, regular rhythm, normal heart sounds and intact distal pulses. No murmur heard. Pulmonary/Chest: Effort normal and breath sounds normal. No respiratory distress. She has no wheezes. She has no rales. Abdominal: Soft. Bowel sounds are normal. She exhibits no distension. obese   Musculoskeletal: She exhibits tenderness (lumbars/knees/hands w/o clear RA changes). She exhibits no edema. djd   Neurological: She is alert and oriented to person, place, and time. Coordination normal.   Skin: Skin is warm and dry. No rash noted. Psychiatric: Her behavior is normal.   Chronically depressed  Poor insight to her medical issues   Nursing note and vitals reviewed. ASSESSMENT and PLAN  Diagnoses and all orders for this visit:    1. Type 2 diabetes mellitus without complication, without long-term current use of insulin (HCC)  -     METABOLIC PANEL, BASIC; Future  -     ALT; Future  -     AST; Future  -     LIPID PANEL; Future  -     HEMOGLOBIN A1C WITH EAG; Future    2. MS (multiple sclerosis) ([facility] [address]    3. Rheumatoid arthritis, involving unspecified site, unspecified rheumatoid factor presence ([facility] [address]    4. Hep C w/o coma, chronic (HCC)    5. Essential hypertension    6. Localized osteoporosis, unspecified pathological fracture presence    7. Primary insomnia    Other orders  -     zolpidem (AMBIEN) 5 mg tablet;  Take 1 Tab by mouth nightly as needed for Sleep.  -     insulin glargine (LANTUS,BASAGLAR) 100 unit/mL (3 mL) inpn; 10 units at bedtime  -     risedronate (ACTONEL) 150 mg tablet; Take 1 Tab by mouth every thirty (30) days. Follow-up Disposition:  Return in about 3 months (around 10/14/2017).    lab results and schedule of future lab studies reviewed with patient  reviewed diet, exercise and weight control  reviewed medications and side effects in detail  low cholesterol diet, weight control and daily exercise discussed, home glucose monitoring emphasized, all medications, side effects and compliance discussed carefully, foot care discussed and Podiatry visits discussed, annual eye examinations at Ophthalmology discussed, glycohemoglobin and other lab monitoring discussed and long term diabetic complications discussed  F/u with other MD's as scheduled  Monitor Bs at home with goal of 100 to150  I will not prescribe narcotics for her

## 2017-08-07 RX ORDER — CLONIDINE HYDROCHLORIDE 0.1 MG/1
TABLET ORAL
Qty: 60 TAB | Refills: 4 | Status: SHIPPED | OUTPATIENT
Start: 2017-08-07 | End: 2017-11-13 | Stop reason: SDUPTHER

## 2017-08-28 RX ORDER — CITALOPRAM 20 MG/1
TABLET, FILM COATED ORAL
Qty: 30 TAB | Refills: 5 | Status: SHIPPED | OUTPATIENT
Start: 2017-08-28 | End: 2018-04-11 | Stop reason: SDUPTHER

## 2017-08-28 RX ORDER — SITAGLIPTIN 100 MG/1
TABLET, FILM COATED ORAL
Qty: 30 TAB | Refills: 5 | Status: SHIPPED | OUTPATIENT
Start: 2017-08-28 | End: 2018-03-18 | Stop reason: SDUPTHER

## 2017-09-05 ENCOUNTER — TELEPHONE (OUTPATIENT)
Dept: NEUROLOGY | Age: 67
End: 2017-09-05

## 2017-09-05 NOTE — TELEPHONE ENCOUNTER
Spoke with patient, informed her  wanted to have MRI done, but it hasn't been done. She requested phone number to schedule, provided number. Informed her of appointment for next week, she states she'll need to reschedule.

## 2017-10-12 DIAGNOSIS — E11.9 TYPE 2 DIABETES MELLITUS WITHOUT COMPLICATION, WITHOUT LONG-TERM CURRENT USE OF INSULIN (HCC): ICD-10-CM

## 2017-10-14 DIAGNOSIS — E11.9 TYPE 2 DIABETES MELLITUS WITHOUT COMPLICATION, WITHOUT LONG-TERM CURRENT USE OF INSULIN (HCC): ICD-10-CM

## 2017-10-18 ENCOUNTER — HOSPITAL ENCOUNTER (OUTPATIENT)
Dept: MRI IMAGING | Age: 67
Discharge: HOME | End: 2017-10-18
Attending: PSYCHIATRY & NEUROLOGY
Payer: MEDICAID

## 2017-10-18 DIAGNOSIS — G35 MS (MULTIPLE SCLEROSIS) (HCC): ICD-10-CM

## 2017-10-18 LAB — CREAT BLD-MCNC: 0.8 MG/DL (ref 0.6–1.3)

## 2017-10-18 PROCEDURE — 70553 MRI BRAIN STEM W/O & W/DYE: CPT

## 2017-10-18 PROCEDURE — 82565 ASSAY OF CREATININE: CPT

## 2017-10-18 PROCEDURE — 74011250636 HC RX REV CODE- 250/636: Performed by: PSYCHIATRY & NEUROLOGY

## 2017-10-18 PROCEDURE — A9576 INJ PROHANCE MULTIPACK: HCPCS | Performed by: PSYCHIATRY & NEUROLOGY

## 2017-10-18 RX ADMIN — GADOTERIDOL 15 ML: 279.3 INJECTION, SOLUTION INTRAVENOUS at 15:01

## 2017-10-19 DIAGNOSIS — K21.9 GASTROESOPHAGEAL REFLUX DISEASE, ESOPHAGITIS PRESENCE NOT SPECIFIED: Primary | ICD-10-CM

## 2017-10-19 RX ORDER — ESOMEPRAZOLE MAGNESIUM 20 MG/1
TABLET ORAL
Refills: 2 | Status: CANCELLED | OUTPATIENT
Start: 2017-10-19

## 2017-10-20 RX ORDER — HYDROGEN PEROXIDE 3 %
20 SOLUTION, NON-ORAL MISCELLANEOUS DAILY
Qty: 30 CAP | Refills: 5 | Status: SHIPPED | OUTPATIENT
Start: 2017-10-20 | End: 2018-01-19 | Stop reason: SDUPTHER

## 2017-10-25 ENCOUNTER — OFFICE VISIT (OUTPATIENT)
Dept: NEUROLOGY | Age: 67
End: 2017-10-25

## 2017-10-25 VITALS
DIASTOLIC BLOOD PRESSURE: 86 MMHG | HEART RATE: 78 BPM | SYSTOLIC BLOOD PRESSURE: 156 MMHG | RESPIRATION RATE: 18 BRPM | BODY MASS INDEX: 28.87 KG/M2 | WEIGHT: 163 LBS | OXYGEN SATURATION: 98 %

## 2017-10-25 DIAGNOSIS — G35 MS (MULTIPLE SCLEROSIS) (HCC): Primary | ICD-10-CM

## 2017-10-25 DIAGNOSIS — E11.9 TYPE 2 DIABETES MELLITUS WITHOUT COMPLICATION, WITHOUT LONG-TERM CURRENT USE OF INSULIN (HCC): ICD-10-CM

## 2017-10-25 DIAGNOSIS — B18.2 HEP C W/O COMA, CHRONIC (HCC): ICD-10-CM

## 2017-10-25 DIAGNOSIS — I10 ESSENTIAL HYPERTENSION: ICD-10-CM

## 2017-10-25 RX ORDER — INSULIN GLARGINE 100 [IU]/ML
INJECTION, SOLUTION SUBCUTANEOUS
Qty: 15 PEN | Refills: 0 | Status: SHIPPED | OUTPATIENT
Start: 2017-10-25 | End: 2018-04-30 | Stop reason: SDUPTHER

## 2017-10-25 RX ORDER — NORTRIPTYLINE HYDROCHLORIDE 25 MG/1
CAPSULE ORAL
Qty: 60 CAP | Refills: 2 | Status: SHIPPED | OUTPATIENT
Start: 2017-10-25 | End: 2017-12-29

## 2017-10-25 NOTE — PROGRESS NOTES
Neurology Clinic Follow up Note    Patient ID:  Bryanna Acevedo  [id number]  89 y.o.  1950      Ms. Rainer Albert is here for follow up today of MS       Last Appointment With Me:  2/2017      Interval History:   Bryanna Acevedo is a 74 y.o. right handed female with a PMH including DM, HTN, HCV, remote stroke presenting for f/u of MS. Date of onset: 1997-experienced knee buckling with falls, paresthesias L side later progressing to both feet, +fatigue, +optic neuritis b/l  Dx made via MRI and LP. Date of diagnosis: 1999    Disease course from Onset: RRMS    Disease course last year: RRMS, stable    Last imaging: MRI Brain [facility] COGNITIVE DISORDERS 10/2017  Extensive intra-axial signal alterations are stable in appearance in  keeping with patient's diagnosis of multiple sclerosis. No enhancing lesion is  demonstrated. Medications for MS Used in the Past:   Avonex (current)- doesn't like injections due to thigh tenderness  Tecfidera- could not swallow pills, +nausea, flushing    Interval history:  Baseline MS sx-denies focal weakness, numbness. Reports some sharp head pains lasting a few seconds at most occurring every few days on average. Denies new focal weakness, numbness, vision loss. +Blurred vision b/l and occasionally seeing colors in her vision in the evenings. Scheduled to see ophthalmology. Also reporting occasionally visual hallucinations x few years- seeing spiders. She has noted this when taking Ambien in the evenings. Endorses some occasional muscle cramping into the distal toes. Sleep: impaired due to nocturia    Fatigue: severe fatigue    Memory/Concentration:  Mild forgetfulness    Bladder: frequent urination, urinary urgency    Bowel: constipated    Depression: +Anxiety, mild depression on medication. Pain: head pain as described above        PMHx/ PSHx/ FHx/ SHx:  Reviewed and unchanged previous visit.    Past Medical History:   Diagnosis Date    Arthritis     OSTEO A & OSTEOPOROSIS     Depression     Diabetes ([facility] [address]     GERD (gastroesophageal reflux disease)     Hepatitis C     Hypertension     LBP (low back pain)     Lipoma 9/3/2013    Liver disease     CHRONIC HEP C    MS (multiple sclerosis) (HCC)     Other ill-defined conditions     MS    Other ill-defined conditions     HX DRUG ABUSE. CLEAN FOR 21 YRS.  Psychiatric disorder     DEPRESSION.  Stroke [facility])     Diagnosed 10/2011    Urinary incontinence          ROS:  Comprehensive review of systems negative except for as noted above. Objective:       Meds:  Current Outpatient Prescriptions   Medication Sig Dispense Refill    esomeprazole (NEXIUM 24HR) 20 mg capsule Take 1 Cap by mouth daily. Indications: gastroesophageal reflux disease 30 Cap 5    JANUVIA 100 mg tablet TAKE 1 TABLET BY MOUTH DAILY 30 Tab 5    citalopram (CELEXA) 20 mg tablet TAKE 1 TABLET BY MOUTH EVERY DAY 30 Tab 5    cloNIDine HCl (CATAPRES) 0.1 mg tablet TAKE 1 TABLET BY MOUTH TWICE A DAY 60 Tab 4    ammonium lactate (LAC-HYDRIN) 12 % lotion APPLY TO AFFECTED AREA AND RUB IN TO IT WELL  5    calcium citrate-vitamin D3 (CITRACAL WITH VITAMIN D MAXIMUM) tablet       zolpidem (AMBIEN) 5 mg tablet Take 1 Tab by mouth nightly as needed for Sleep. 30 Tab 0    insulin glargine (LANTUS,BASAGLAR) 100 unit/mL (3 mL) inpn 10 units at bedtime 1 Pen 5    risedronate (ACTONEL) 150 mg tablet Take 1 Tab by mouth every thirty (30) days. 3 Tab 3    pen needle, diabetic 33 gauge x 5/16\" ndle 1 Each by Does Not Apply route daily. 1 Box 5    metoprolol succinate (TOPROL-XL) 200 mg XL tablet TAKE 1 TABLET BY MOUTH EVERY DAY 30 Tab 5    glipiZIDE (GLUCOTROL) 10 mg tablet TAKE 1 TAB BY MOUTH BEFORE BREAKFAST AND DINNER. 60 Tab 11    amLODIPine (NORVASC) 10 mg tablet TAKE 1 TABLET BY MOUTH EVERY DAY 30 Tab 5    AVONEX 30 mcg/0.5 mL pnkt 0.5 mL by IntraMUSCular route every seven (7) days.  4 Pen 11    ULTRA THIN LANCETS 30 gauge misc USE TO TEST 3 TIMES DAILY OR AS DIRECTED  88    Lancets misc Test blood sugar as directed 1 Package 11    glucose blood VI test strips (CONTOUR NEXT STRIPS) strip Test blood sugar twice daily. 100 Strip 11    Blood-Glucose Meter (ACCU-CHEK ALONSO PLUS METER) misc Test Blood Sugars as directed 1 Each 3       Exam:  Visit Vitals    /86    Pulse 78    Resp 18    Wt 73.9 kg (163 lb)    SpO2 98%    BMI 28.87 kg/m2     NEUROLOGICAL EXAM:  General: Awake, alert, speech fluent  CN: PERRL, EOMI without nystagmus, VFF to confrontation, facial sensation and strength are normal and symmetric, hearing is intact to finger rub bilaterally, palate and tongue movements are intact and symmetric. Motor: Normal tone, bulk and strength bilaterally. Reflexes: 1/4 and symmetric except for 3+ b/l patella, plantar stimulation is flexor. Coordination: FNF, ALONDRA, HTS intact. Sensation: LT intact throughout except for distal RLE  Gait: Normal-based and steady. LABS  Results for orders placed or performed during the hospital encounter of 10/18/17   POC CREATININE   Result Value Ref Range    Creatinine (POC) 0.8 0.6 - 1.3 MG/DL    GFRAA, POC >60 >60 ml/min/1.73m2    GFRNA, POC >60 >60 ml/min/1.73m2       IMAGING:  MRI Results (most recent):    Results from [place] encounter on 10/18/17   MRI BRAIN W WO CONT   Narrative INDICATION: MS multiple sclerosis G 35. COMPARISON: MRI 9/18/2014    EXAM: Sagittal T1-weighted spin-echo, axial and sagittal FLAIR and T2-weighted  fast spin-echo, axial diffusion weighted echo planar, axial T1-weighted  spin-echo, axial gradient echo, and post IV contrast-enhanced axial and coronal  T1-weighted spin-echo MR images of the brain are obtained. A total of 15 cc  intravenous ProHance was administered for the study. The study was performed on  an open configuration 0.7 Jolene (intermediate) field strength MR imaging system.     FINDINGS: Abundant foci of white matter signal alteration appear similar in  distribution and extent as that shown previously. A few tiny foci of hemosiderin  deposition are again noted in the thalami bilaterally and the right temporal  lobe. There remains no evidence for intra-axial enhancement abnormality; there  are 2 areas of artifactual enhancement in the inferior left medulla and superior  right medulla which related to flow artifact.) the ventricles and cortical sulci  remain normal in size and contour. The vascular flow voids at the base the brain  are normal in conspicuity. Sella, optic chiasm, orbits and paranasal sinuses  remain normal in appearance. Impression IMPRESSION: Extensive intra-axial signal alterations are stable in appearance in  keeping with patient's diagnosis of multiple sclerosis. No enhancing lesion is  demonstrated. Assessment:     Encounter Diagnoses     ICD-10-CM ICD-9-CM   1. MS (multiple sclerosis) ([facility] [address] G35 340   2. Hep C w/o coma, chronic (HCC) B18.2 070.54   3. Essential hypertension I10 401.9   4. Type 2 diabetes mellitus without complication, without long-term current use of insulin (HCC) E11.9 250.00   Pleasant 61year old RHAAF h/o DM, HTN, HCV, depression and remote stroke here for f/u of MS dx 1999 (prior optic neuritis b/l, L paresthesias) on Avonex. Clinically, she appears stable without significant focal deficits. Denies recent exacerbations. Prior MRI Brain from 2014 independently reviewed and c/w MS with moderate periventricular T2 hyperintensities. No evidence of cervical lesions on MRI C spine. No evidence of radiographic progression of disease activity on Avonex on review of most recent MRI Brain 10/2017. Will obtain updated labs to evaluate for toxicity on DMT. She mentions visual hallucinations in the evenings for several years now which may be attributable to Ambien. Will have her discontinue and monitor for any improvement.   In the meantime, for her insomnia, nocturia and recent stabbing headaches will start a trial of Nortriptyline. We reviewed the multiple sclerosis diagnosis, prognosis, and implications. We reviewed the 3-pronged treatment strategy: treating acute flares, using preventative treatments to prevent future relapses and brain lesions, and treating residual symptoms. For long-term treatment, I recommend continuation of Avonex. Discussed medication dosage, usage, goals of therapy, and side effects. Plan:   CBC, CMP, thyroid panel  Cont. Avonex once weekly injections  Cont. Vit D supplementation  D/C Ambien, start Nortriptyline 25mg qhs x 4 weeks, may increase to 50mg qhs if needed     Follow-up Disposition:  Return in about 6 months (around 4/25/2018).     Signed:  Margarita Kirk DO  10/25/2017  1:59 PM

## 2017-10-25 NOTE — PATIENT INSTRUCTIONS

## 2017-10-25 NOTE — MR AVS SNAPSHOT
Visit Information Date & Time Provider Department Dept. Phone Encounter #  
 10/25/2017  2:00 PM John Houston, [facility] at [address] [id number] Follow-up Instructions Return in about 6 months (around 4/25/2018). Your Appointments 11/21/2017  2:30 PM  
ROUTINE CARE with Berenice Zamorano DO [facility] ([address]) Appt Note: 3 month follow up; R/S; medication refills; medication refills 3 month follow up [address] [facility] [address] [facility] [address]  
[phone]  
  
   
 [address] [facility] [address] Upcoming Health Maintenance Date Due  
 EYE EXAM RETINAL OR DILATED Q1 7/18/1962 COLONOSCOPY 7/18/1970 DTaP/Tdap/Td series (1 - Tdap) 7/18/1973 ZOSTER VACCINE AGE 60> 5/18/2012 MICROALBUMIN Q1 6/23/2016 GLAUCOMA SCREENING Q2Y 7/18/2017 Pneumococcal 65+ Low/Medium Risk (1 of 2 - PCV13) 7/18/2017 MEDICARE YEARLY EXAM 7/18/2017 INFLUENZA AGE 9 TO ADULT 8/1/2017 FOOT EXAM Q1 10/25/2017 HEMOGLOBIN A1C Q6M 12/19/2017 LIPID PANEL Q1 6/19/2018 BREAST CANCER SCRN MAMMOGRAM 10/17/2018 Allergies as of 10/25/2017  Review Complete On: 10/25/2017 By: John Houston DO Severity Noted Reaction Type Reactions Pcn [Penicillins] High 02/02/2010   Intolerance Swelling Fosamax [Alendronate] Medium 02/02/2010   Intolerance Swelling Sulfa (Sulfonamide Antibiotics) Low 02/02/2010   Topical Itching Current Immunizations  Reviewed on 10/25/2016 Name Date Influenza Nasal Vaccine 9/25/2016 Pneumococcal Polysaccharide (PPSV-23) 1/4/2015 10:52 AM  
  
 Not reviewed this visit You Were Diagnosed With   
  
 Codes Comments MS (multiple sclerosis) ([facility] [address]    -  Primary ICD-10-CM: G35 
ICD-9-CM: 589 Hep C w/o coma, chronic (HCC)     ICD-10-CM: B18.2 ICD-9-CM: 070.54  Essential hypertension     ICD-10-CM: I10 
 ICD-9-CM: 401.9 Type 2 diabetes mellitus without complication, without long-term current use of insulin (HCC)     ICD-10-CM: E11.9 ICD-9-CM: 250.00 Vitals BP Pulse Resp Weight(growth percentile) SpO2 BMI  
 156/86 78 18 163 lb (73.9 kg) 98% 28.87 kg/m2 OB Status Smoking Status Hysterectomy Never Smoker Vitals History BMI and BSA Data Body Mass Index Body Surface Area  
 28.87 kg/m 2 1.81 m 2 Preferred Pharmacy Pharmacy Name Phone [id number], [address] [place] [address] [address] [phone] Your Updated Medication List  
  
   
This list is accurate as of: 10/25/17  2:23 PM.  Always use your most recent med list. amLODIPine 10 mg tablet Commonly known as:  Danial Conine TAKE 1 TABLET BY MOUTH EVERY DAY  
  
 ammonium lactate 12 % lotion Commonly known as:  LAC-HYDRIN  
APPLY TO AFFECTED AREA AND RUB IN TO IT WELL  
  
 AVONEX 30 mcg/0.5 mL Pnkt Generic drug:  interferon beta-1a  
0.5 mL by IntraMUSCular route every seven (7) days. Blood-Glucose Meter Misc Commonly known as:  ACCU-CHEK ALONSO PLUS METER Test Blood Sugars as directed  
  
 calcium citrate-vitamin D3 tablet Commonly known as:  CITRACAL WITH VITAMIN D MAXIMUM  
  
 citalopram 20 mg tablet Commonly known as:  CELEXA  
TAKE 1 TABLET BY MOUTH EVERY DAY  
  
 cloNIDine HCl 0.1 mg tablet Commonly known as:  CATAPRES  
TAKE 1 TABLET BY MOUTH TWICE A DAY  
  
 esomeprazole 20 mg capsule Commonly known as:  NexIUM 24HR Take 1 Cap by mouth daily. Indications: gastroesophageal reflux disease  
  
 glipiZIDE 10 mg tablet Commonly known as:  GLUCOTROL  
TAKE 1 TAB BY MOUTH BEFORE BREAKFAST AND DINNER. glucose blood VI test strips strip Commonly known as:  CONTOUR NEXT STRIPS Test blood sugar twice daily. insulin glargine 100 unit/mL (3 mL) Inpn Commonly known as:  LANTUSBASAGLAR  
10 units at bedtime JANUVIA 100 mg tablet Generic drug:  SITagliptin TAKE 1 TABLET BY MOUTH DAILY * Lancets Misc Test blood sugar as directed * ULTRA THIN LANCETS 30 gauge Misc Generic drug:  lancets USE TO TEST 3 TIMES DAILY OR AS DIRECTED  
  
 metoprolol succinate 200 mg XL tablet Commonly known as:  TOPROL-XL  
TAKE 1 TABLET BY MOUTH EVERY DAY  
  
 nortriptyline 25 mg capsule Commonly known as:  PAMELOR Take 25mg qhs x 4 weeks, then increase to 50mg qhs  
  
 pen needle, diabetic 33 gauge x 5/16\" Ndle 1 Each by Does Not Apply route daily. risedronate 150 mg tablet Commonly known as:  ACTONEL Take 1 Tab by mouth every thirty (30) days. zolpidem 5 mg tablet Commonly known as:  AMBIEN Take 1 Tab by mouth nightly as needed for Sleep. * Notice: This list has 2 medication(s) that are the same as other medications prescribed for you. Read the directions carefully, and ask your doctor or other care provider to review them with you. Prescriptions Sent to Pharmacy Refills  
 nortriptyline (PAMELOR) 25 mg capsule 2 Sig: Take 25mg qhs x 4 weeks, then increase to 50mg qhs  
 Class: Normal  
 Pharmacy: [address] [place] [address] , [address] [place] [address] #: [phone] We Performed the Following CBC WITH AUTOMATED DIFF [92072 CPT(R)] METABOLIC PANEL, COMPREHENSIVE [27195 CPT(R)] THYROID PANEL W/TSH [45627 CPT(R)] Follow-up Instructions Return in about 6 months (around 4/25/2018). Patient Instructions A Healthy Lifestyle: Care Instructions Your Care Instructions A healthy lifestyle can help you feel good, stay at a healthy weight, and have plenty of energy for both work and play. A healthy lifestyle is something you can share with your whole family. A healthy lifestyle also can lower your risk for serious health problems, such as high blood pressure, heart disease, and diabetes. You can follow a few steps listed below to improve your health and the health of your family. Follow-up care is a key part of your treatment and safety. Be sure to make and go to all appointments, and call your doctor if you are having problems. Its also a good idea to know your test results and keep a list of the medicines you take. How can you care for yourself at home? · Do not eat too much sugar, fat, or fast foods. You can still have dessert and treats now and then. The goal is moderation. · Start small to improve your eating habits. Pay attention to portion sizes, drink less juice and soda pop, and eat more fruits and vegetables. ¨ Eat a healthy amount of food. A 3-ounce serving of meat, for example, is about the size of a deck of cards. Fill the rest of your plate with vegetables and whole grains. ¨ Limit the amount of soda and sports drinks you have every day. Drink more water when you are thirsty. ¨ Eat at least 5 servings of fruits and vegetables every day. It may seem like a lot, but it is not hard to reach this goal. A serving or helping is 1 piece of fruit, 1 cup of vegetables, or 2 cups of leafy, raw vegetables. Have an apple or some carrot sticks as an afternoon snack instead of a candy bar. Try to have fruits and/or vegetables at every meal. 
· Make exercise part of your daily routine. You may want to start with simple activities, such as walking, bicycling, or slow swimming. Try to be active 30 to 60 minutes every day. You do not need to do all 30 to 60 minutes all at once. For example, you can exercise 3 times a day for 10 or 20 minutes. Moderate exercise is safe for most people, but it is always a good idea to talk to your doctor before starting an exercise program. 
· Keep moving. Krysta Alexander the lawn, work in the garden, or [employer]. Take the stairs instead of the elevator at work. · If you smoke, quit.  People who smoke have an increased risk for heart attack, stroke, cancer, and other lung illnesses. Quitting is hard, but there are ways to boost your chance of quitting tobacco for good. ¨ Use nicotine gum, patches, or lozenges. ¨ Ask your doctor about stop-smoking programs and medicines. ¨ Keep trying. In addition to reducing your risk of diseases in the future, you will notice some benefits soon after you stop using tobacco. If you have shortness of breath or asthma symptoms, they will likely get better within a few weeks after you quit. · Limit how much alcohol you drink. Moderate amounts of alcohol (up to 2 drinks a day for men, 1 drink a day for women) are okay. But drinking too much can lead to liver problems, high blood pressure, and other health problems. Family health If you have a family, there are many things you can do together to improve your health. · Eat meals together as a family as often as possible. · Eat healthy foods. This includes fruits, vegetables, lean meats and dairy, and whole grains. · Include your family in your fitness plan. Most people think of activities such as jogging or tennis as the way to fitness, but there are many ways you and your family can be more active. Anything that makes you breathe hard and gets your heart pumping is exercise. Here are some tips: 
¨ Walk to do errands or to take your child to school or the bus. ¨ Go for a family bike ride after dinner instead of watching TV. Where can you learn more? Go to http://addis-johnathon.info/. Enter Q502 in the search box to learn more about \"A Healthy Lifestyle: Care Instructions. \" Current as of: July 26, 2016 Content Version: 11.3 © 3464-5794 [facility]. Care instructions adapted under license by [employer] (which disclaims liability or warranty for this information).  If you have questions about a medical condition or this instruction, always ask your healthcare professional. [employer] disclaims any warranty or liability for your use of this information. Introducing [facility]! Basilia Yancey introduces [employer] patient portal. Now you can access parts of your medical record, email your doctor's office, and request medication refills online. 1. In your internet browser, go to [email or web address] 2. Click on the First Time User? Click Here link in the Sign In box. You will see the New Member Sign Up page. 3. Enter your [employer] Access Code exactly as it appears below. You will not need to use this code after youve completed the sign-up process. If you do not sign up before the expiration date, you must request a new code. · [employer] Access Code: [email or web address] Expires: 1/23/2018  1:54 PM 
 
4. Enter the last four digits of your Social Security Number (xxxx) and Date of Birth (mm/dd/yyyy) as indicated and click Submit. You will be taken to the next sign-up page. 5. Create a [employer] ID. This will be your [employer] login ID and cannot be changed, so think of one that is secure and easy to remember. 6. Create a [employer] password. You can change your password at any time. 7. Enter your Password Reset Question and Answer. This can be used at a later time if you forget your password. 8. Enter your e-mail address. You will receive e-mail notification when new information is available in [address] 9. Click Sign Up. You can now view and download portions of your medical record. 10. Click the Download Summary menu link to download a portable copy of your medical information. If you have questions, please visit the Frequently Asked Questions section of the [employer] website. Remember, [employer] is NOT to be used for urgent needs. For medical emergencies, dial 911. Now available from your iPhone and Android! Please provide this summary of care documentation to your next provider. Your primary care clinician is listed as Frances Cuellar. If you have any questions after today's visit, please call [phone].

## 2017-10-26 LAB
ALBUMIN SERPL-MCNC: 4.3 G/DL (ref 3.6–4.8)
ALBUMIN/GLOB SERPL: 1.4 {RATIO} (ref 1.2–2.2)
ALP SERPL-CCNC: 114 IU/L (ref 39–117)
ALT SERPL-CCNC: 19 IU/L (ref 0–32)
AST SERPL-CCNC: 19 IU/L (ref 0–40)
BASOPHILS # BLD AUTO: 0 X10E3/UL (ref 0–0.2)
BASOPHILS NFR BLD AUTO: 1 %
BILIRUB SERPL-MCNC: 0.3 MG/DL (ref 0–1.2)
BUN SERPL-MCNC: 12 MG/DL (ref 8–27)
BUN/CREAT SERPL: 13 (ref 12–28)
CALCIUM SERPL-MCNC: 10 MG/DL (ref 8.7–10.3)
CHLORIDE SERPL-SCNC: 96 MMOL/L (ref 96–106)
CO2 SERPL-SCNC: 31 MMOL/L (ref 18–29)
CREAT SERPL-MCNC: 0.94 MG/DL (ref 0.57–1)
EOSINOPHIL # BLD AUTO: 0.2 X10E3/UL (ref 0–0.4)
EOSINOPHIL NFR BLD AUTO: 3 %
ERYTHROCYTE [DISTWIDTH] IN BLOOD BY AUTOMATED COUNT: 14.2 % (ref 12.3–15.4)
FT4I SERPL CALC-MCNC: 2.3 (ref 1.2–4.9)
GFR SERPLBLD CREATININE-BSD FMLA CKD-EPI: 64 ML/MIN/1.73
GFR SERPLBLD CREATININE-BSD FMLA CKD-EPI: 74 ML/MIN/1.73
GLOBULIN SER CALC-MCNC: 3.1 G/DL (ref 1.5–4.5)
GLUCOSE SERPL-MCNC: 154 MG/DL (ref 65–99)
HCT VFR BLD AUTO: 40.3 % (ref 34–46.6)
HGB BLD-MCNC: 12.9 G/DL (ref 11.1–15.9)
IMM GRANULOCYTES # BLD: 0 X10E3/UL (ref 0–0.1)
IMM GRANULOCYTES NFR BLD: 1 %
LYMPHOCYTES # BLD AUTO: 1.9 X10E3/UL (ref 0.7–3.1)
LYMPHOCYTES NFR BLD AUTO: 34 %
MCH RBC QN AUTO: 25.7 PG (ref 26.6–33)
MCHC RBC AUTO-ENTMCNC: 32 G/DL (ref 31.5–35.7)
MCV RBC AUTO: 80 FL (ref 79–97)
MONOCYTES # BLD AUTO: 0.7 X10E3/UL (ref 0.1–0.9)
MONOCYTES NFR BLD AUTO: 12 %
NEUTROPHILS # BLD AUTO: 2.8 X10E3/UL (ref 1.4–7)
NEUTROPHILS NFR BLD AUTO: 49 %
PLATELET # BLD AUTO: 263 X10E3/UL (ref 150–379)
POTASSIUM SERPL-SCNC: 4.5 MMOL/L (ref 3.5–5.2)
PROT SERPL-MCNC: 7.4 G/DL (ref 6–8.5)
RBC # BLD AUTO: 5.02 X10E6/UL (ref 3.77–5.28)
SODIUM SERPL-SCNC: 143 MMOL/L (ref 134–144)
T3RU NFR SERPL: 23 % (ref 24–39)
T4 SERPL-MCNC: 9.9 UG/DL (ref 4.5–12)
TSH SERPL DL<=0.005 MIU/L-ACNC: 0.53 UIU/ML (ref 0.45–4.5)
WBC # BLD AUTO: 5.6 X10E3/UL (ref 3.4–10.8)

## 2017-11-12 RX ORDER — METOPROLOL SUCCINATE 200 MG/1
TABLET, EXTENDED RELEASE ORAL
Qty: 30 TAB | Refills: 0 | Status: SHIPPED | COMMUNITY
Start: 2017-11-12 | End: 2017-12-16 | Stop reason: SDUPTHER

## 2017-11-13 RX ORDER — AMLODIPINE BESYLATE 10 MG/1
TABLET ORAL
Qty: 90 TAB | Refills: 0 | Status: SHIPPED | OUTPATIENT
Start: 2017-11-13 | End: 2018-02-14 | Stop reason: SDUPTHER

## 2017-11-13 RX ORDER — CLONIDINE HYDROCHLORIDE 0.1 MG/1
TABLET ORAL
Qty: 180 TAB | Refills: 0 | Status: SHIPPED | OUTPATIENT
Start: 2017-11-13 | End: 2018-02-14 | Stop reason: SDUPTHER

## 2017-11-15 ENCOUNTER — TELEPHONE (OUTPATIENT)
Dept: NEUROLOGY | Age: 67
End: 2017-11-15

## 2017-11-15 NOTE — TELEPHONE ENCOUNTER
Pt calling, stated that her vertigo and dizziness has gotten very bad and would like Dr. Rick Bradshaw to write a prescription for Meclazine 25mg.

## 2017-11-16 NOTE — TELEPHONE ENCOUNTER
Spoke with patient, informed her  wants her to speak with the original doctor who prescribed meclazine. She states it was her previous neurologist who was prescribing medication.

## 2017-11-17 RX ORDER — MECLIZINE HYDROCHLORIDE 25 MG/1
25 TABLET ORAL
Qty: 30 TAB | Refills: 0 | Status: SHIPPED | OUTPATIENT
Start: 2017-11-17 | End: 2018-04-30 | Stop reason: ALTCHOICE

## 2017-12-28 ENCOUNTER — HOSPITAL ENCOUNTER (OUTPATIENT)
Age: 67
Setting detail: OBSERVATION
Discharge: HOME | End: 2017-12-29
Attending: EMERGENCY MEDICINE | Admitting: INTERNAL MEDICINE
Payer: MEDICAID

## 2017-12-28 DIAGNOSIS — R07.9 CHEST PAIN, UNSPECIFIED TYPE: Primary | ICD-10-CM

## 2017-12-28 LAB
ALBUMIN SERPL-MCNC: 3.5 G/DL (ref 3.5–5)
ALBUMIN/GLOB SERPL: 0.9 {RATIO} (ref 1.1–2.2)
ALP SERPL-CCNC: 123 U/L (ref 45–117)
ALT SERPL-CCNC: 23 U/L (ref 12–78)
ANION GAP SERPL CALC-SCNC: 10 MMOL/L (ref 5–15)
AST SERPL-CCNC: 19 U/L (ref 15–37)
BASOPHILS # BLD: 0 K/UL (ref 0–0.1)
BASOPHILS NFR BLD: 0 % (ref 0–1)
BILIRUB SERPL-MCNC: 0.1 MG/DL (ref 0.2–1)
BUN SERPL-MCNC: 17 MG/DL (ref 6–20)
BUN/CREAT SERPL: 17 (ref 12–20)
CALCIUM SERPL-MCNC: 8.9 MG/DL (ref 8.5–10.1)
CHLORIDE SERPL-SCNC: 103 MMOL/L (ref 97–108)
CO2 SERPL-SCNC: 28 MMOL/L (ref 21–32)
CREAT SERPL-MCNC: 0.98 MG/DL (ref 0.55–1.02)
EOSINOPHIL # BLD: 0.1 K/UL (ref 0–0.4)
EOSINOPHIL NFR BLD: 1 % (ref 0–7)
ERYTHROCYTE [DISTWIDTH] IN BLOOD BY AUTOMATED COUNT: 13.4 % (ref 11.5–14.5)
GLOBULIN SER CALC-MCNC: 4 G/DL (ref 2–4)
GLUCOSE SERPL-MCNC: 191 MG/DL (ref 65–100)
HCT VFR BLD AUTO: 39.4 % (ref 35–47)
HGB BLD-MCNC: 12.4 G/DL (ref 11.5–16)
LYMPHOCYTES # BLD: 2.2 K/UL (ref 0.8–3.5)
LYMPHOCYTES NFR BLD: 46 % (ref 12–49)
MCH RBC QN AUTO: 25.5 PG (ref 26–34)
MCHC RBC AUTO-ENTMCNC: 31.5 G/DL (ref 30–36.5)
MCV RBC AUTO: 80.9 FL (ref 80–99)
MONOCYTES # BLD: 0.5 K/UL (ref 0–1)
MONOCYTES NFR BLD: 9 % (ref 5–13)
NEUTS SEG # BLD: 2.1 K/UL (ref 1.8–8)
NEUTS SEG NFR BLD: 44 % (ref 32–75)
PLATELET # BLD AUTO: 229 K/UL (ref 150–400)
POTASSIUM SERPL-SCNC: 3.3 MMOL/L (ref 3.5–5.1)
PROT SERPL-MCNC: 7.5 G/DL (ref 6.4–8.2)
RBC # BLD AUTO: 4.87 M/UL (ref 3.8–5.2)
SODIUM SERPL-SCNC: 141 MMOL/L (ref 136–145)
TROPONIN I SERPL-MCNC: <0.04 NG/ML
WBC # BLD AUTO: 4.8 K/UL (ref 3.6–11)

## 2017-12-28 PROCEDURE — 99284 EMERGENCY DEPT VISIT MOD MDM: CPT

## 2017-12-28 PROCEDURE — 96372 THER/PROPH/DIAG INJ SC/IM: CPT

## 2017-12-28 PROCEDURE — 99285 EMERGENCY DEPT VISIT HI MDM: CPT

## 2017-12-28 PROCEDURE — 93005 ELECTROCARDIOGRAM TRACING: CPT

## 2017-12-28 PROCEDURE — 85025 COMPLETE CBC W/AUTO DIFF WBC: CPT | Performed by: EMERGENCY MEDICINE

## 2017-12-28 PROCEDURE — 80053 COMPREHEN METABOLIC PANEL: CPT | Performed by: EMERGENCY MEDICINE

## 2017-12-28 PROCEDURE — 36415 COLL VENOUS BLD VENIPUNCTURE: CPT | Performed by: EMERGENCY MEDICINE

## 2017-12-28 PROCEDURE — 84484 ASSAY OF TROPONIN QUANT: CPT | Performed by: EMERGENCY MEDICINE

## 2017-12-29 ENCOUNTER — APPOINTMENT (OUTPATIENT)
Dept: GENERAL RADIOLOGY | Age: 67
End: 2017-12-29
Attending: EMERGENCY MEDICINE
Payer: MEDICAID

## 2017-12-29 ENCOUNTER — APPOINTMENT (OUTPATIENT)
Dept: NUCLEAR MEDICINE | Age: 67
End: 2017-12-29
Attending: NURSE PRACTITIONER
Payer: MEDICAID

## 2017-12-29 VITALS
OXYGEN SATURATION: 93 % | WEIGHT: 173 LBS | SYSTOLIC BLOOD PRESSURE: 151 MMHG | TEMPERATURE: 98.1 F | DIASTOLIC BLOOD PRESSURE: 71 MMHG | HEIGHT: 64 IN | HEART RATE: 53 BPM | RESPIRATION RATE: 14 BRPM | BODY MASS INDEX: 29.53 KG/M2

## 2017-12-29 PROBLEM — R07.9 CHEST PAIN: Status: ACTIVE | Noted: 2017-12-29

## 2017-12-29 LAB
ALBUMIN SERPL-MCNC: 3.1 G/DL (ref 3.5–5)
ALBUMIN/GLOB SERPL: 0.9 {RATIO} (ref 1.1–2.2)
ALP SERPL-CCNC: 124 U/L (ref 45–117)
ALT SERPL-CCNC: 22 U/L (ref 12–78)
AMYLASE SERPL-CCNC: 90 U/L (ref 25–115)
ANION GAP SERPL CALC-SCNC: 9 MMOL/L (ref 5–15)
AST SERPL-CCNC: 17 U/L (ref 15–37)
ATRIAL RATE: 64 BPM
ATTENDING PHYSICIAN, CST07: NORMAL
BASOPHILS # BLD: 0 K/UL (ref 0–0.1)
BASOPHILS NFR BLD: 0 % (ref 0–1)
BILIRUB SERPL-MCNC: 0.2 MG/DL (ref 0.2–1)
BUN SERPL-MCNC: 13 MG/DL (ref 6–20)
BUN/CREAT SERPL: 19 (ref 12–20)
CALCIUM SERPL-MCNC: 8.3 MG/DL (ref 8.5–10.1)
CALCULATED P AXIS, ECG09: 15 DEGREES
CALCULATED R AXIS, ECG10: -3 DEGREES
CALCULATED T AXIS, ECG11: 54 DEGREES
CHLORIDE SERPL-SCNC: 103 MMOL/L (ref 97–108)
CHOLEST SERPL-MCNC: 216 MG/DL
CK MB CFR SERPL CALC: 1.5 % (ref 0–2.5)
CK MB SERPL-MCNC: 1.1 NG/ML (ref 5–25)
CK SERPL-CCNC: 73 U/L (ref 26–192)
CO2 SERPL-SCNC: 28 MMOL/L (ref 21–32)
CREAT SERPL-MCNC: 0.68 MG/DL (ref 0.55–1.02)
D DIMER PPP FEU-MCNC: 0.26 MG/L FEU (ref 0–0.65)
DIAGNOSIS, 93000: NORMAL
DIAGNOSIS, 93000: NORMAL
DUKE TM SCORE RESULT, CST14: NORMAL
DUKE TREADMILL SCORE, CST13: NORMAL
ECG INTERP BEFORE EX, CST11: NORMAL
ECG INTERP DURING EX, CST12: NORMAL
EOSINOPHIL # BLD: 0.1 K/UL (ref 0–0.4)
EOSINOPHIL NFR BLD: 2 % (ref 0–7)
ERYTHROCYTE [DISTWIDTH] IN BLOOD BY AUTOMATED COUNT: 13.5 % (ref 11.5–14.5)
EST. AVERAGE GLUCOSE BLD GHB EST-MCNC: 192 MG/DL
FUNCTIONAL CAPACITY, CST17: NORMAL
GLOBULIN SER CALC-MCNC: 3.6 G/DL (ref 2–4)
GLUCOSE BLD STRIP.AUTO-MCNC: 142 MG/DL (ref 65–100)
GLUCOSE BLD STRIP.AUTO-MCNC: 185 MG/DL (ref 65–100)
GLUCOSE BLD STRIP.AUTO-MCNC: 190 MG/DL (ref 65–100)
GLUCOSE SERPL-MCNC: 198 MG/DL (ref 65–100)
HBA1C MFR BLD: 8.3 % (ref 4.2–6.3)
HCT VFR BLD AUTO: 35.1 % (ref 35–47)
HDLC SERPL-MCNC: 53 MG/DL
HDLC SERPL: 4.1 {RATIO} (ref 0–5)
HGB BLD-MCNC: 11.4 G/DL (ref 11.5–16)
KNOWN CARDIAC CONDITION, CST08: NORMAL
LDLC SERPL CALC-MCNC: 133 MG/DL (ref 0–100)
LIPASE SERPL-CCNC: 166 U/L (ref 73–393)
LIPID PROFILE,FLP: ABNORMAL
LYMPHOCYTES # BLD: 2.4 K/UL (ref 0.8–3.5)
LYMPHOCYTES NFR BLD: 49 % (ref 12–49)
MAGNESIUM SERPL-MCNC: 1.8 MG/DL (ref 1.6–2.4)
MAX. DIASTOLIC BP, CST04: 88 MMHG
MAX. HEART RATE, CST05: 69 BPM
MAX. SYSTOLIC BP, CST03: 174 MMHG
MCH RBC QN AUTO: 25.7 PG (ref 26–34)
MCHC RBC AUTO-ENTMCNC: 32.5 G/DL (ref 30–36.5)
MCV RBC AUTO: 79.2 FL (ref 80–99)
MONOCYTES # BLD: 0.4 K/UL (ref 0–1)
MONOCYTES NFR BLD: 9 % (ref 5–13)
NEUTS SEG # BLD: 1.9 K/UL (ref 1.8–8)
NEUTS SEG NFR BLD: 40 % (ref 32–75)
OVERALL BP RESPONSE TO EXERCISE, CST16: NORMAL
OVERALL HR RESPONSE TO EXERCISE, CST15: NORMAL
P-R INTERVAL, ECG05: 176 MS
PEAK EX METS, CST10: 1 METS
PHOSPHATE SERPL-MCNC: 3.2 MG/DL (ref 2.6–4.7)
PLATELET # BLD AUTO: 218 K/UL (ref 150–400)
POTASSIUM SERPL-SCNC: 3.1 MMOL/L (ref 3.5–5.1)
PROT SERPL-MCNC: 6.7 G/DL (ref 6.4–8.2)
PROTOCOL NAME, CST01: NORMAL
Q-T INTERVAL, ECG07: 534 MS
QRS DURATION, ECG06: 76 MS
QTC CALCULATION (BEZET), ECG08: 550 MS
RBC # BLD AUTO: 4.43 M/UL (ref 3.8–5.2)
SERVICE CMNT-IMP: ABNORMAL
SODIUM SERPL-SCNC: 140 MMOL/L (ref 136–145)
TEST INDICATION, CST09: NORMAL
TRIGL SERPL-MCNC: 150 MG/DL (ref ?–150)
TROPONIN I SERPL-MCNC: <0.04 NG/ML
TROPONIN I SERPL-MCNC: <0.04 NG/ML
TSH SERPL DL<=0.05 MIU/L-ACNC: 0.66 UIU/ML (ref 0.36–3.74)
VENTRICULAR RATE, ECG03: 64 BPM
VLDLC SERPL CALC-MCNC: 30 MG/DL
WBC # BLD AUTO: 4.8 K/UL (ref 3.6–11)

## 2017-12-29 PROCEDURE — 83735 ASSAY OF MAGNESIUM: CPT | Performed by: INTERNAL MEDICINE

## 2017-12-29 PROCEDURE — 84443 ASSAY THYROID STIM HORMONE: CPT | Performed by: INTERNAL MEDICINE

## 2017-12-29 PROCEDURE — 74011250636 HC RX REV CODE- 250/636: Performed by: INTERNAL MEDICINE

## 2017-12-29 PROCEDURE — 83036 HEMOGLOBIN GLYCOSYLATED A1C: CPT | Performed by: INTERNAL MEDICINE

## 2017-12-29 PROCEDURE — 84100 ASSAY OF PHOSPHORUS: CPT | Performed by: INTERNAL MEDICINE

## 2017-12-29 PROCEDURE — 82550 ASSAY OF CK (CPK): CPT | Performed by: INTERNAL MEDICINE

## 2017-12-29 PROCEDURE — 80053 COMPREHEN METABOLIC PANEL: CPT | Performed by: INTERNAL MEDICINE

## 2017-12-29 PROCEDURE — 93306 TTE W/DOPPLER COMPLETE: CPT

## 2017-12-29 PROCEDURE — 74011250637 HC RX REV CODE- 250/637: Performed by: INTERNAL MEDICINE

## 2017-12-29 PROCEDURE — 99218 HC RM OBSERVATION: CPT

## 2017-12-29 PROCEDURE — 84484 ASSAY OF TROPONIN QUANT: CPT | Performed by: INTERNAL MEDICINE

## 2017-12-29 PROCEDURE — 80061 LIPID PANEL: CPT | Performed by: INTERNAL MEDICINE

## 2017-12-29 PROCEDURE — 74011250637 HC RX REV CODE- 250/637: Performed by: EMERGENCY MEDICINE

## 2017-12-29 PROCEDURE — A9500 TC99M SESTAMIBI: HCPCS

## 2017-12-29 PROCEDURE — 93017 CV STRESS TEST TRACING ONLY: CPT

## 2017-12-29 PROCEDURE — 36415 COLL VENOUS BLD VENIPUNCTURE: CPT | Performed by: INTERNAL MEDICINE

## 2017-12-29 PROCEDURE — 71010 XR CHEST PORT: CPT

## 2017-12-29 PROCEDURE — 82150 ASSAY OF AMYLASE: CPT | Performed by: INTERNAL MEDICINE

## 2017-12-29 PROCEDURE — 82962 GLUCOSE BLOOD TEST: CPT

## 2017-12-29 PROCEDURE — 85025 COMPLETE CBC W/AUTO DIFF WBC: CPT | Performed by: INTERNAL MEDICINE

## 2017-12-29 PROCEDURE — 85379 FIBRIN DEGRADATION QUANT: CPT | Performed by: INTERNAL MEDICINE

## 2017-12-29 PROCEDURE — 83690 ASSAY OF LIPASE: CPT | Performed by: INTERNAL MEDICINE

## 2017-12-29 PROCEDURE — 74011636637 HC RX REV CODE- 636/637: Performed by: INTERNAL MEDICINE

## 2017-12-29 RX ORDER — GUAIFENESIN 100 MG/5ML
81 LIQUID (ML) ORAL DAILY
COMMUNITY

## 2017-12-29 RX ORDER — DEXTROSE 50 % IN WATER (D50W) INTRAVENOUS SYRINGE
12.5-25 AS NEEDED
Status: DISCONTINUED | OUTPATIENT
Start: 2017-12-29 | End: 2017-12-29 | Stop reason: HOSPADM

## 2017-12-29 RX ORDER — MAGNESIUM SULFATE 100 %
4 CRYSTALS MISCELLANEOUS AS NEEDED
Status: DISCONTINUED | OUTPATIENT
Start: 2017-12-29 | End: 2017-12-29 | Stop reason: HOSPADM

## 2017-12-29 RX ORDER — AMMONIUM LACTATE 12 G/100G
LOTION TOPICAL 2 TIMES DAILY
COMMUNITY
End: 2018-08-10 | Stop reason: SDUPTHER

## 2017-12-29 RX ORDER — ENOXAPARIN SODIUM 100 MG/ML
40 INJECTION SUBCUTANEOUS EVERY 24 HOURS
Status: DISCONTINUED | OUTPATIENT
Start: 2017-12-29 | End: 2017-12-29 | Stop reason: HOSPADM

## 2017-12-29 RX ORDER — GUAIFENESIN 100 MG/5ML
324 LIQUID (ML) ORAL
Status: COMPLETED | OUTPATIENT
Start: 2017-12-29 | End: 2017-12-29

## 2017-12-29 RX ORDER — INSULIN LISPRO 100 [IU]/ML
INJECTION, SOLUTION INTRAVENOUS; SUBCUTANEOUS
Status: DISCONTINUED | OUTPATIENT
Start: 2017-12-29 | End: 2017-12-29 | Stop reason: HOSPADM

## 2017-12-29 RX ORDER — MORPHINE SULFATE 2 MG/ML
2 INJECTION, SOLUTION INTRAMUSCULAR; INTRAVENOUS
Status: DISCONTINUED | OUTPATIENT
Start: 2017-12-29 | End: 2017-12-29 | Stop reason: HOSPADM

## 2017-12-29 RX ORDER — CLONIDINE HYDROCHLORIDE 0.1 MG/1
0.1 TABLET ORAL 2 TIMES DAILY
Status: DISCONTINUED | OUTPATIENT
Start: 2017-12-29 | End: 2017-12-29 | Stop reason: HOSPADM

## 2017-12-29 RX ORDER — FERROUS SULFATE, DRIED 160(50) MG
1 TABLET, EXTENDED RELEASE ORAL DAILY
Status: DISCONTINUED | OUTPATIENT
Start: 2017-12-29 | End: 2017-12-29 | Stop reason: HOSPADM

## 2017-12-29 RX ORDER — POTASSIUM CHLORIDE 750 MG/1
40 TABLET, FILM COATED, EXTENDED RELEASE ORAL
Status: COMPLETED | OUTPATIENT
Start: 2017-12-29 | End: 2017-12-29

## 2017-12-29 RX ORDER — DOCUSATE SODIUM 100 MG/1
100 CAPSULE, LIQUID FILLED ORAL 2 TIMES DAILY
Status: DISCONTINUED | OUTPATIENT
Start: 2017-12-29 | End: 2017-12-29 | Stop reason: HOSPADM

## 2017-12-29 RX ORDER — AMLODIPINE BESYLATE 5 MG/1
10 TABLET ORAL DAILY
Status: DISCONTINUED | OUTPATIENT
Start: 2017-12-29 | End: 2017-12-29 | Stop reason: HOSPADM

## 2017-12-29 RX ORDER — INTERFERON BETA-1A 30 UG/.5ML
30 INJECTION, SOLUTION INTRAMUSCULAR
COMMUNITY
End: 2018-04-30 | Stop reason: SDUPTHER

## 2017-12-29 RX ORDER — ASCORBIC ACID 500 MG
500 TABLET ORAL DAILY
COMMUNITY
End: 2020-12-18 | Stop reason: ALTCHOICE

## 2017-12-29 RX ORDER — ACETAMINOPHEN 325 MG/1
650 TABLET ORAL
Status: DISCONTINUED | OUTPATIENT
Start: 2017-12-29 | End: 2017-12-29 | Stop reason: HOSPADM

## 2017-12-29 RX ORDER — GUAIFENESIN 100 MG/5ML
81 LIQUID (ML) ORAL DAILY
Status: DISCONTINUED | OUTPATIENT
Start: 2017-12-29 | End: 2017-12-29 | Stop reason: HOSPADM

## 2017-12-29 RX ORDER — RISEDRONATE SODIUM 150 MG/1
150 TABLET, FILM COATED ORAL
Status: DISCONTINUED | OUTPATIENT
Start: 2017-12-29 | End: 2017-12-29

## 2017-12-29 RX ORDER — PANTOPRAZOLE SODIUM 40 MG/1
40 TABLET, DELAYED RELEASE ORAL
Status: DISCONTINUED | OUTPATIENT
Start: 2017-12-29 | End: 2017-12-29 | Stop reason: HOSPADM

## 2017-12-29 RX ORDER — METOPROLOL SUCCINATE 50 MG/1
200 TABLET, EXTENDED RELEASE ORAL DAILY
Status: DISCONTINUED | OUTPATIENT
Start: 2017-12-29 | End: 2017-12-29 | Stop reason: HOSPADM

## 2017-12-29 RX ORDER — HYDROCODONE BITARTRATE AND ACETAMINOPHEN 5; 325 MG/1; MG/1
1 TABLET ORAL
Status: DISCONTINUED | OUTPATIENT
Start: 2017-12-29 | End: 2017-12-29 | Stop reason: HOSPADM

## 2017-12-29 RX ORDER — HYDROGEN PEROXIDE 3 %
20 SOLUTION, NON-ORAL MISCELLANEOUS DAILY
Status: DISCONTINUED | OUTPATIENT
Start: 2017-12-29 | End: 2017-12-29 | Stop reason: CLARIF

## 2017-12-29 RX ORDER — ONDANSETRON 2 MG/ML
4 INJECTION INTRAMUSCULAR; INTRAVENOUS
Status: DISCONTINUED | OUTPATIENT
Start: 2017-12-29 | End: 2017-12-29 | Stop reason: HOSPADM

## 2017-12-29 RX ORDER — NORTRIPTYLINE HYDROCHLORIDE 25 MG/1
25 CAPSULE ORAL
Status: DISCONTINUED | OUTPATIENT
Start: 2017-12-29 | End: 2017-12-29

## 2017-12-29 RX ORDER — SODIUM CHLORIDE 0.9 % (FLUSH) 0.9 %
20 SYRINGE (ML) INJECTION
Status: COMPLETED | OUTPATIENT
Start: 2017-12-29 | End: 2017-12-29

## 2017-12-29 RX ORDER — CITALOPRAM 20 MG/1
20 TABLET, FILM COATED ORAL EVERY EVENING
Status: DISCONTINUED | OUTPATIENT
Start: 2017-12-29 | End: 2017-12-29 | Stop reason: HOSPADM

## 2017-12-29 RX ORDER — MECLIZINE HCL 12.5 MG 12.5 MG/1
25 TABLET ORAL
Status: DISCONTINUED | OUTPATIENT
Start: 2017-12-29 | End: 2017-12-29 | Stop reason: HOSPADM

## 2017-12-29 RX ORDER — ZOLPIDEM TARTRATE 5 MG/1
5 TABLET ORAL
Status: DISCONTINUED | OUTPATIENT
Start: 2017-12-29 | End: 2017-12-29 | Stop reason: HOSPADM

## 2017-12-29 RX ADMIN — Medication 20 ML: at 10:06

## 2017-12-29 RX ADMIN — METOPROLOL SUCCINATE 200 MG: 50 TABLET, EXTENDED RELEASE ORAL at 08:07

## 2017-12-29 RX ADMIN — CLONIDINE HYDROCHLORIDE 0.1 MG: 0.1 TABLET ORAL at 08:07

## 2017-12-29 RX ADMIN — ENOXAPARIN SODIUM 40 MG: 40 INJECTION SUBCUTANEOUS at 06:49

## 2017-12-29 RX ADMIN — POTASSIUM CHLORIDE 40 MEQ: 750 TABLET, FILM COATED, EXTENDED RELEASE ORAL at 06:48

## 2017-12-29 RX ADMIN — DOCUSATE SODIUM 100 MG: 100 CAPSULE, LIQUID FILLED ORAL at 08:07

## 2017-12-29 RX ADMIN — REGADENOSON 0.4 MG: 0.08 INJECTION, SOLUTION INTRAVENOUS at 10:06

## 2017-12-29 RX ADMIN — INSULIN LISPRO 2 UNITS: 100 INJECTION, SOLUTION INTRAVENOUS; SUBCUTANEOUS at 12:35

## 2017-12-29 RX ADMIN — ASPIRIN 81 MG 324 MG: 81 TABLET ORAL at 00:25

## 2017-12-29 RX ADMIN — AMLODIPINE BESYLATE 10 MG: 5 TABLET ORAL at 08:07

## 2017-12-29 RX ADMIN — ASPIRIN 81 MG 81 MG: 81 TABLET ORAL at 08:07

## 2017-12-29 RX ADMIN — PANTOPRAZOLE SODIUM 40 MG: 40 TABLET, DELAYED RELEASE ORAL at 08:07

## 2017-12-29 NOTE — PROGRESS NOTES
Admission Medication Reconciliation:    Information obtained from: patient, rx query    Significant PMH/Disease States:   Past Medical History:   Diagnosis Date    Arthritis     OSTEO A & OSTEOPOROSIS     Depression     Diabetes ([address]     GERD (gastroesophageal reflux disease)     Hepatitis C     Hypertension     LBP (low back pain)     Lipoma 9/3/2013    Liver disease     CHRONIC HEP C    MS (multiple sclerosis) (HCC)     Other ill-defined conditions(799.89)     MS    Other ill-defined conditions(799.89)     HX DRUG ABUSE. CLEAN FOR 21 YRS.  Psychiatric disorder     DEPRESSION.  Stroke [facility])     Diagnosed 10/2011    Urinary incontinence        Chief Complaint for this Admission:  chest pain    Allergies:  Pcn [penicillins]; Fosamax [alendronate]; and Sulfa (sulfonamide antibiotics)    Prior to Admission Medications:   Prior to Admission Medications   Prescriptions Last Dose Informant Patient Reported? Taking? BASAGLAR KWIKPEN 100 unit/mL (3 mL) inpn 2017 at Unknown time  No Yes   Sig: 10 UNITS AT BEDTIME   JANUVIA 100 mg tablet 2017 at 1200  No Yes   Sig: TAKE 1 TABLET BY MOUTH DAILY   amLODIPine (NORVASC) 10 mg tablet 2017 at 0930  No Yes   Sig: TAKE 1 TABLET BY MOUTH EVERY DAY( need appt for further refills)   ammonium lactate (LAC-HYDRIN) 12 % lotion 2017 at Unknown time  Yes Yes   Sig: Apply  to affected area two (2) times a day. rub in to affected area well   Indications: on legs   ascorbic acid, vitamin C, (VITAMIN C) 500 mg tablet 2017 at Unknown time  Yes Yes   Sig: Take 500 mg by mouth daily. aspirin 81 mg chewable tablet 2017 at Unknown time  Yes Yes   Sig: Take 81 mg by mouth daily. calcium citrate-vitamin D3 (CITRACAL WITH VITAMIN D MAXIMUM) tablet 2017 at 1600  Yes Yes   Si Tab daily.    citalopram (CELEXA) 20 mg tablet 2017 at 0930  No Yes   Sig: TAKE 1 TABLET BY MOUTH EVERY DAY   cloNIDine HCl (CATAPRES) 0.1 mg tablet 2017 at 2100  No Yes   Sig: TAKE 1 TABLET BY MOUTH TWICE A DAY(need appt for future refills)   esomeprazole (NEXIUM 24HR) 20 mg capsule 2017 at 1200  No Yes   Sig: Take 1 Cap by mouth daily. Indications: gastroesophageal reflux disease   glipiZIDE (GLUCOTROL) 10 mg tablet 2017 at 1200  No Yes   Sig: TAKE 1 TAB BY MOUTH BEFORE BREAKFAST AND DINNER.   interferon beta-1a (AVONEX) 30 mcg/0.5 mL injection 2017  Yes Yes   Si mcg by IntraMUSCular route every Monday. meclizine (ANTIVERT) 25 mg tablet Not Taking at Unknown time  No No   Sig: Take 1 Tab by mouth every eight (8) hours as needed. metoprolol succinate (TOPROL-XL) 200 mg XL tablet 2017 at 0930  No Yes   Sig: TAKE 1 TABLET BY MOUTH EVERY DAY   risedronate (ACTONEL) 150 mg tablet 2017  No Yes   Sig: Take 1 Tab by mouth every thirty (30) days. zolpidem (AMBIEN) 5 mg tablet 2017 at Unknown time  No Yes   Sig: Take 1 Tab by mouth nightly as needed for Sleep. Facility-Administered Medications: None         Comments/Recommendations: Discussed home medications with patient who is a good historian. Added frequency for lac-hydrin and calcium + vit d. Patient has not started nortriptyline as she \"has a more positive outlook now\" and is feeling \"less depressed. \" Added vitamin C. Confirmed allergy status. Patient did not have any questions at this time.       Wanda Villaseñor, PharmD  ED EXT [phone]

## 2017-12-29 NOTE — ED NOTES
Waiting on inpatient orders. Dr. Sowmya Nugent reports that he is currently working on it. Patient aware of plan of care.

## 2017-12-29 NOTE — ED NOTES
Received bedside report in SBAR format, updated on [facility], recent results and plan of care from [facility] [place], [address] [place] [address]. Assumed care of patient.

## 2017-12-29 NOTE — DISCHARGE INSTRUCTIONS
Discharge Instructions       PATIENT ID: Alfonzo Soto  MRN: [id number]   YOB: 1950    DATE OF ADMISSION: 12/28/2017 11:48 PM    DATE OF DISCHARGE: 12/29/2017    PRIMARY CARE PROVIDER: Constantine Townsend DO     ATTENDING PHYSICIAN: Wanda Santamaria DO  DISCHARGING PROVIDER: Ekta Reardon NP    To contact this individual call [phone] and ask the  to page. If unavailable ask to be transferred the Adult Hospitalist Department. DISCHARGE DIAGNOSES Chest pain, High blood pressure    CONSULTATIONS: IP CONSULT TO CARDIOLOGY    PROCEDURES/SURGERIES: * No surgery found *    PENDING TEST RESULTS:   At the time of discharge the following test results are still pending: none    FOLLOW UP APPOINTMENTS:   Follow-up Information     Follow up With Details Comments [address], DO In 1 week hospital follow up [address] [facility]  [address] [facility]      Nae Busch MD In 1 week follow up with cardiology  [address]  [phone]             ADDITIONAL CARE RECOMMENDATIONS:   -Follow up with your primary care and cardiology within 1 week. -Check your blood pressure twice daily and record. Take this record with you to your follow up appointments. DIET: Diabetic    ACTIVITY: as tolerated    WOUND CARE: none    EQUIPMENT needed: none      DISCHARGE MEDICATIONS:  See Medication Reconciliation Form    · It is important that you take the medication exactly as they are prescribed. · Keep your medication in the bottles provided by the pharmacist and keep a list of the medication names, dosages, and times to be taken in your wallet. · Do not take other medications without consulting your doctor. NOTIFY YOUR PHYSICIAN FOR ANY OF THE FOLLOWING:   Fever over 101 degrees for 24 hours. Chest pain, shortness of breath, fever, chills, nausea, vomiting, diarrhea, change in mentation, falling, weakness, bleeding.  Severe pain or pain not relieved by medications. Or, any other signs or symptoms that you may have questions about. DISPOSITION:  X  Home With:   OT  PT  HH  RN       SNF/Inpatient Rehab/LTAC    Independent/assisted living    Hospice    Other:       PROBLEM LIST Updated:   Yes X       Signed:   Aidan Marie NP  12/29/2017  12:49 PM

## 2017-12-29 NOTE — ED NOTES
Gave bedside report regarding, SBAR, MAR, and plan of care to [facility]. Transfered care of patient to RN.

## 2017-12-29 NOTE — H&P
[address]  ACUTE CARE HISTORY AND PHYSICAL    Kishan Garcia  MR#: [id number]  : 1950  ACCOUNT #: [de-identified]   DATE OF SERVICE: 2017    PRIMARY CARE PHYSICIAN:  Meryle Sager, DO    SOURCE OF INFORMATION:  The patient. CHIEF COMPLAINT:  Chest pain. HISTORY OF PRESENT ILLNESS:  This is a 63-year-old woman with a past medical history significant for depression, hypertension, multiple sclerosis, hepatitis C infection and type 2 diabetes, who was in her usual state of health until about 4 days ago, when the patient started experiencing chest pain. The patient described the chest pain as intermittent sharp pain, each episode lasting a few seconds. The pain is located at the center of the chest with radiation to the right jaw, associated with nausea, 8/10 in severity. No known aggravating or relieving factors. The chest pain is also associated with diaphoresis. The patient stated that she had a similar episode about 8 months ago, and did not receive any evaluation. She had a stress test done about 5 years ago that was negative. The chest pain is not associated with fever, rigors or chills. No associated shortness of breath. The patient came to the emergency room for further evaluation. When the patient arrived at the emergency room, the first set of troponin was negative. The patient was subsequently referred to the hospitalist service for evaluation for admission. PAST MEDICAL HISTORY:  Hypertension, type 2 diabetes, multiple sclerosis, hepatitis C infection and depression. ALLERGIES:  PATIENT IS ALLERGIC TO PENICILLIN AND SULFA.     MEDICATIONS:  Norvasc 10 mg daily, aspirin 81 mg daily, Avonex 30 mcg intramuscularly every week, Celexa 20 mg daily, clonidine 0.1 mg twice daily, Nexium 20 mg daily, Glucotrol 10 mg twice daily, Januvia 100 mg daily, Antivert 25 mg every 8 hours as needed, Toprol  mg daily, Pamelor 25 mg at bedtime, Actonel 150 mg daily, Ambien 5 mg daily at bedtime as needed for sleep. FAMILY HISTORY:  This was reviewed. Her mother had hypertension and rheumatoid arthritis. Her father had cancer, the type of cancer is not known. PAST SURGICAL HISTORY:  Significant for hysterectomy and excision of lipoma from the left thigh. SOCIAL HISTORY:  No history of alcohol or tobacco abuse. REVIEW OF SYSTEMS:  HEAD, EYES, EARS, NOSE AND THROAT:  No headache, no dizziness, no blurring of vision, no photophobia. RESPIRATORY:  No cough, no shortness of breath, no hemoptysis. CARDIOVASCULAR:  Positive for chest pain. No orthopnea. No palpitations. GASTROINTESTINAL:  Positive for nausea. No vomiting, no diarrhea, no constipation. GENITOURINARY:  No dysuria, no urgency and no frequency. All other systems are reviewed and are negative. PHYSICAL EXAMINATION:  GENERAL:  Patient appeared ill and in moderate distress. VITAL SIGNS:  On arrival at the emergency room, temperature 97.7, pulse 64, respiratory rate 16, blood pressure 154/82, oxygen saturation 96% on room air. HEAD:  Normocephalic, atraumatic. EYES:  Normal eye movement. No redness, no drainage, no discharge. EARS:  Normal external ears, with no obvious drainage. NOSE:  No deformity, no drainage. MOUTH AND THROAT:  No visible oral lesion. NECK:  Supple, no JVD, no thyromegaly. CHEST:  Clear breath sounds: No wheezing, no crackles. HEART:  Normal S1 and S2, regular. No clinically appreciable murmur. ABDOMEN:  Soft, nontender. Normal bowel sounds. CENTRAL NERVOUS SYSTEM:  Alert, oriented x3. No gross focal neurological deficit. EXTREMITIES:  No edema. Pulses 2+ bilaterally. MUSCULOSKELETAL:  No obvious joint deformity or swelling. SKIN:  No active skin lesion seen in the exposed parts of the body. PSYCHIATRIC:  Normal mood and affect. LYMPHATICS:  No cervical lymphadenopathy.     DIAGNOSTIC DATA:  EKG shows normal sinus rhythm, nonspecific ST and T-wave abnormalities. Chest x-ray shows mild cardiomegaly, no acute pathology. LABORATORY DATA:  Chemistry:  Sodium 141, potassium 3.3, chloride 103, CO2 28, glucose 191, BUN 17, creatinine 0.98, calcium 8.9, bilirubin total 0.1, ALT 23, AST 19, alkaline phosphatase 123, total protein 7.5, albumin level 3.5, globulin 4.0. Hematology:  WBC 4.8, hemoglobin 12.4, hematocrit 39.4, platelet 457. Cardiac profile:  Troponin less than 0.04.    ASSESSMENT:  1. Chest pain. 2.  Hypertension. 3.  Type 2 diabetes. 4.  Multiple sclerosis. 5.  Hepatitis C infection. 6.  Depression. 7.  Hypokalemia. PLAN:  1. Chest pain. Will place the patient on observation. Will obtain serial cardiac markers to rule out acute myocardial infarction. Will continue aspirin and beta blocker. Cardiology consult will be requested to assist in the evaluation and treatment of chest pain. The patient will also be evaluated for other atypical causes of chest pain by checking an amylase and a lipase level. Will also check a D-dimer to evaluate the patient for thromboembolism as a possible cause of her chest pain. 2.  Hypertension. Will resume preadmission medication. Will monitor the patient's blood pressure closely. 3.  Type 2 diabetes. Will place the patient on sliding scale with insulin coverage. Will check hemoglobin A1c level. Will add oral agents until the time of discharge from the hospital.  4.  Multiple sclerosis. Will continue with preadmission medication. 5.  Hepatitis C infection. The patient will continue to follow with her primary care physician for continuation of care. 6.  Depression. Will resume home medication. 7.  Hypokalemia. Will replace the potassium and repeat a potassium level. Will also check a magnesium level. OTHER ISSUES:  1. CODE STATUS:  THE PATIENT IS A FULL CODE. 2.  Will place the patient on Lovenox for DVT prophylaxis.       MD JORY De Souza / BRIANNA  D: 12/29/2017 04:46     T: 12/29/2017 10:08  JOB #: [id number]  CC: Vidhi Clark DO

## 2017-12-29 NOTE — ED NOTES
Pt resting comfortably on stretcher. Denies needs at this time. Call light within reach, patient instructed on use. Patient denies any pain.

## 2017-12-29 NOTE — PROGRESS NOTES
The following oral bisphosphonate has been discontinued upon admission per [facility] approved policy:    risedronate (ACTONEL) 150 mg tablet [[id number]]       Dose: 150 mg Route: Oral Frequency: EVERY 30 DAYS          Please reorder upon discharge if appropriate.

## 2017-12-29 NOTE — CONSULTS
Consult Note      Assessment:    Patient Active Problem List   Diagnosis Code    HTN (hypertension) I10    Fatigue R53.83    MS (multiple sclerosis) ([facility] [address] G35    DM (diabetes mellitus) ([facility] [address] E11.9    RA (rheumatoid arthritis) ([facility]) M06.9    Depression F32.9    HA (headache) R51    Right lateral epicondylitis M77.11    Hep C w/o coma, chronic (HCC) B18.2    Right lateral epicondylitis M77.11    Lipoma D17.9    Bilateral hip pain M25.551, M25.552    Right upper lobe pneumonia (HCC) J18.1    Osteoporosis M81.0    Primary insomnia F51.01    Chest pain R07.9       Recommendations:    stress test done, results pending. If normal could release. Maudie Gosselin, MD  [phone]  Mello Guardado is a 74 y.o. female who presented 12/28/2017 with complaints of chest pain. The symptoms began approximately 1 day ago. She has no history of cardiac disease including CAD. Her pain was sudden in onset and worse with deep breath, lasted only seconds. Examination by the ER physician suggested the possibility of CAD. At present the patient has significantly improved since initial presentation. Therapy thus far has included pain medications. Cardiology has been consulted to assist in the management of this patient. Current Facility-Administered Medications   Medication Dose Route Frequency    acetaminophen (TYLENOL) tablet 650 mg  650 mg Oral Q4H PRN    amLODIPine (NORVASC) tablet 10 mg  10 mg Oral DAILY    aspirin chewable tablet 81 mg  81 mg Oral DAILY    . PHARMACY TO SUBSTITUTE PER PROTOCOL    Per Protocol    calcium-vitamin D (OS-NATE) 500 mg-200 unit tablet  1 Tab Oral DAILY    citalopram (CELEXA) tablet 20 mg  20 mg Oral QPM    cloNIDine HCl (CATAPRES) tablet 0.1 mg  0.1 mg Oral BID    meclizine (ANTIVERT) tablet 25 mg  25 mg Oral Q8H PRN    metoprolol succinate (TOPROL-XL) XL tablet 200 mg  200 mg Oral DAILY    zolpidem (AMBIEN) tablet 5 mg  5 mg Oral QHS PRN    HYDROcodone-acetaminophen (NORCO) 5-325 mg per tablet 1 Tab  1 Tab Oral Q4H PRN    morphine injection 2 mg  2 mg IntraVENous Q4H PRN    ondansetron (ZOFRAN) injection 4 mg  4 mg IntraVENous Q4H PRN    docusate sodium (COLACE) capsule 100 mg  100 mg Oral BID    enoxaparin (LOVENOX) injection 40 mg  40 mg SubCUTAneous Q24H    insulin lispro (HUMALOG) injection   SubCUTAneous AC&HS    glucose chewable tablet 16 g  4 Tab Oral PRN    dextrose (D50W) injection syrg 12.5-25 g  12.5-25 g IntraVENous PRN    glucagon (GLUCAGEN) injection 1 mg  1 mg IntraMUSCular PRN    pantoprazole (PROTONIX) tablet 40 mg  40 mg Oral ACB     Current Outpatient Prescriptions   Medication Sig    aspirin 81 mg chewable tablet Take 81 mg by mouth daily.  interferon beta-1a (AVONEX) 30 mcg/0.5 mL injection 30 mcg by IntraMUSCular route every Monday.  ammonium lactate (LAC-HYDRIN) 12 % lotion Apply  to affected area two (2) times a day. rub in to affected area well   Indications: on legs    ascorbic acid, vitamin C, (VITAMIN C) 500 mg tablet Take 500 mg by mouth daily.  metoprolol succinate (TOPROL-XL) 200 mg XL tablet TAKE 1 TABLET BY MOUTH EVERY DAY    amLODIPine (NORVASC) 10 mg tablet TAKE 1 TABLET BY MOUTH EVERY DAY( need appt for further refills)    cloNIDine HCl (CATAPRES) 0.1 mg tablet TAKE 1 TABLET BY MOUTH TWICE A DAY(need appt for future refills)    BASAGLAR KWIKPEN 100 unit/mL (3 mL) inpn 10 UNITS AT BEDTIME    esomeprazole (NEXIUM 24HR) 20 mg capsule Take 1 Cap by mouth daily. Indications: gastroesophageal reflux disease    JANUVIA 100 mg tablet TAKE 1 TABLET BY MOUTH DAILY    citalopram (CELEXA) 20 mg tablet TAKE 1 TABLET BY MOUTH EVERY DAY    calcium citrate-vitamin D3 (CITRACAL WITH VITAMIN D MAXIMUM) tablet 1 Tab daily.  zolpidem (AMBIEN) 5 mg tablet Take 1 Tab by mouth nightly as needed for Sleep.  risedronate (ACTONEL) 150 mg tablet Take 1 Tab by mouth every thirty (30) days.     glipiZIDE (GLUCOTROL) 10 mg tablet TAKE 1 TAB BY MOUTH BEFORE BREAKFAST AND DINNER.  meclizine (ANTIVERT) 25 mg tablet Take 1 Tab by mouth every eight (8) hours as needed. Past Medical History:   Diagnosis Date    Arthritis     OSTEO A & OSTEOPOROSIS     Depression     Diabetes ([facility] [address]     GERD (gastroesophageal reflux disease)     Hepatitis C     Hypertension     LBP (low back pain)     Lipoma 9/3/2013    Liver disease     CHRONIC HEP C    [facility]) ([facility]     Other ill-defined conditions(799.89)     MS    Other ill-defined conditions(799.89)     HX DRUG ABUSE. CLEAN FOR 21 YRS.  Psychiatric disorder     DEPRESSION.  Stroke [facility])     Diagnosed 10/2011    Urinary incontinence      Patient Active Problem List   Diagnosis Code    HTN (hypertension) I10    Fatigue R53.83    MS (multiple sclerosis) ([facility] [address] G35    DM (diabetes mellitus) ([facility] [address] E11.9    RA (rheumatoid arthritis) ([facility]) M06.9    Depression F32.9    HA (headache) R51    Right lateral epicondylitis M77.11    Hep C w/o coma, chronic (HCC) B18.2    Right lateral epicondylitis M77.11    Lipoma D17.9    Bilateral hip pain M25.551, M25.552    Right upper lobe pneumonia (HCC) J18.1    Osteoporosis M81.0    Primary insomnia F51.01    Chest pain R07.9     Allergies   Allergen Reactions    Pcn [Penicillins] Swelling    Fosamax [Alendronate] Swelling    Sulfa (Sulfonamide Antibiotics) Itching     Social History   Substance Use Topics    Smoking status: Never Smoker    Smokeless tobacco: Never Used    Alcohol use No     Family History   Problem Relation Age of Onset   [facility] Arthritis-rheumatoid Mother     Hypertension Mother     Cancer Father     Breast Cancer Maternal Aunt     Breast Cancer Maternal Aunt        Review of Symptoms:  A comprehensive review of systems was negative except for that written in the HPI.      Objective:      Visit Vitals    /89    Pulse 65    Temp 97.7 °F (36.5 °C)    Resp 17    Ht 5' 4\" (1.626 m)    Wt 78.5 kg (173 lb)    SpO2 97%    BMI 29.7 kg/m2      Physical Exam    Visit Vitals    /89    Pulse 65    Temp 97.7 °F (36.5 °C)    Resp 17    Ht 5' 4\" (1.626 m)    Wt 78.5 kg (173 lb)    SpO2 97%    BMI 29.7 kg/m2     General Appearance:  Well developed, well nourished,alert and oriented x 3, and individual in no acute distress. Ears/Nose/Mouth/Throat:   Hearing grossly normal.         Neck: Supple. Chest:   Lungs clear to auscultation bilaterally. Cardiovascular:  Regular rate and rhythm, S1, S2 normal, no murmur. Abdomen:   Soft, non-tender, bowel sounds are active. Extremities: No edema bilaterally. Skin: Warm and dry. Cardiographics    Telemetry: normal sinus rhythm  ECG: normal EKG, normal sinus rhythm, unchanged from previous tracings, normal sinus rhythm, nonspecific ST and T waves changes  Echocardiogram: Not done    Labs:   Recent Results (from the past 24 hour(s))   EKG, 12 LEAD, INITIAL    Collection Time: 12/28/17 10:47 PM   Result Value Ref Range    Ventricular Rate 64 BPM    Atrial Rate 64 BPM    P-R Interval 176 ms    QRS Duration 76 ms    Q-T Interval 534 ms    QTC Calculation (Bezet) 550 ms    Calculated P Axis 15 degrees    Calculated R Axis -3 degrees    Calculated T Axis 54 degrees    Diagnosis       Normal sinus rhythm  Nonspecific T wave abnormality  When compared with ECG of 14-MAR-2017 11:57,  QT has lengthened     CBC WITH AUTOMATED DIFF    Collection Time: 12/28/17 10:52 PM   Result Value Ref Range    WBC 4.8 3.6 - 11.0 K/uL    RBC 4.87 3.80 - 5.20 M/uL    HGB 12.4 11.5 - 16.0 g/dL    HCT 39.4 35.0 - 47.0 %    MCV 80.9 80.0 - 99.0 FL    MCH 25.5 (L) 26.0 - 34.0 PG    MCHC 31.5 30.0 - 36.5 g/dL    RDW 13.4 11.5 - 14.5 %    PLATELET 011 365 - 306 K/uL    NEUTROPHILS 44 32 - 75 %    LYMPHOCYTES 46 12 - 49 %    MONOCYTES 9 5 - 13 %    EOSINOPHILS 1 0 - 7 %    BASOPHILS 0 0 - 1 %    ABS. NEUTROPHILS 2.1 1.8 - 8.0 K/UL    ABS.  LYMPHOCYTES 2.2 0.8 - 3.5 K/UL    ABS. MONOCYTES 0.5 0.0 - 1.0 K/UL    ABS. EOSINOPHILS 0.1 0.0 - 0.4 K/UL    ABS. BASOPHILS 0.0 0.0 - 0.1 K/UL   METABOLIC PANEL, COMPREHENSIVE    Collection Time: 12/28/17 10:52 PM   Result Value Ref Range    Sodium 141 136 - 145 mmol/L    Potassium 3.3 (L) 3.5 - 5.1 mmol/L    Chloride 103 97 - 108 mmol/L    CO2 28 21 - 32 mmol/L    Anion gap 10 5 - 15 mmol/L    Glucose 191 (H) 65 - 100 mg/dL    BUN 17 6 - 20 MG/DL    Creatinine 0.98 0.55 - 1.02 MG/DL    BUN/Creatinine ratio 17 12 - 20      GFR est AA >60 >60 ml/min/1.73m2    GFR est non-AA 57 (L) >60 ml/min/1.73m2    Calcium 8.9 8.5 - 10.1 MG/DL    Bilirubin, total 0.1 (L) 0.2 - 1.0 MG/DL    ALT (SGPT) 23 12 - 78 U/L    AST (SGOT) 19 15 - 37 U/L    Alk. phosphatase 123 (H) 45 - 117 U/L    Protein, total 7.5 6.4 - 8.2 g/dL    Albumin 3.5 3.5 - 5.0 g/dL    Globulin 4.0 2.0 - 4.0 g/dL    A-G Ratio 0.9 (L) 1.1 - 2.2     TROPONIN I    Collection Time: 12/28/17 10:52 PM   Result Value Ref Range    Troponin-I, Qt. <0.04 <0.05 ng/mL   GLUCOSE, POC    Collection Time: 12/29/17  1:33 AM   Result Value Ref Range    Glucose (POC) 142 (H) 65 - 100 mg/dL    Performed by [address] [place] [address] Rd, COMPREHENSIVE    Collection Time: 12/29/17  6:18 AM   Result Value Ref Range    Sodium 140 136 - 145 mmol/L    Potassium 3.1 (L) 3.5 - 5.1 mmol/L    Chloride 103 97 - 108 mmol/L    CO2 28 21 - 32 mmol/L    Anion gap 9 5 - 15 mmol/L    Glucose 198 (H) 65 - 100 mg/dL    BUN 13 6 - 20 MG/DL    Creatinine 0.68 0.55 - 1.02 MG/DL    BUN/Creatinine ratio 19 12 - 20      GFR est AA >60 >60 ml/min/1.73m2    GFR est non-AA >60 >60 ml/min/1.73m2    Calcium 8.3 (L) 8.5 - 10.1 MG/DL    Bilirubin, total 0.2 0.2 - 1.0 MG/DL    ALT (SGPT) 22 12 - 78 U/L    AST (SGOT) 17 15 - 37 U/L    Alk.  phosphatase 124 (H) 45 - 117 U/L    Protein, total 6.7 6.4 - 8.2 g/dL    Albumin 3.1 (L) 3.5 - 5.0 g/dL    Globulin 3.6 2.0 - 4.0 g/dL    A-G Ratio 0.9 (L) 1.1 - 2.2     LIPID PANEL Collection Time: 12/29/17  6:18 AM   Result Value Ref Range    LIPID PROFILE          Cholesterol, total 216 (H) <200 MG/DL    Triglyceride 150 (H) <150 MG/DL    HDL Cholesterol 53 MG/DL    LDL, calculated 133 (H) 0 - 100 MG/DL    VLDL, calculated 30 MG/DL    CHOL/HDL Ratio 4.1 0 - 5.0     CBC WITH AUTOMATED DIFF    Collection Time: 12/29/17  6:18 AM   Result Value Ref Range    WBC 4.8 3.6 - 11.0 K/uL    RBC 4.43 3.80 - 5.20 M/uL    HGB 11.4 (L) 11.5 - 16.0 g/dL    HCT 35.1 35.0 - 47.0 %    MCV 79.2 (L) 80.0 - 99.0 FL    MCH 25.7 (L) 26.0 - 34.0 PG    MCHC 32.5 30.0 - 36.5 g/dL    RDW 13.5 11.5 - 14.5 %    PLATELET 315 159 - 229 K/uL    NEUTROPHILS 40 32 - 75 %    LYMPHOCYTES 49 12 - 49 %    MONOCYTES 9 5 - 13 %    EOSINOPHILS 2 0 - 7 %    BASOPHILS 0 0 - 1 %    ABS. NEUTROPHILS 1.9 1.8 - 8.0 K/UL    ABS. LYMPHOCYTES 2.4 0.8 - 3.5 K/UL    ABS. MONOCYTES 0.4 0.0 - 1.0 K/UL    ABS. EOSINOPHILS 0.1 0.0 - 0.4 K/UL    ABS.  BASOPHILS 0.0 0.0 - 0.1 K/UL   MAGNESIUM    Collection Time: 12/29/17  6:18 AM   Result Value Ref Range    Magnesium 1.8 1.6 - 2.4 mg/dL   PHOSPHORUS    Collection Time: 12/29/17  6:18 AM   Result Value Ref Range    Phosphorus 3.2 2.6 - 4.7 MG/DL   CK W/ CKMB & INDEX    Collection Time: 12/29/17  6:18 AM   Result Value Ref Range    CK 73 26 - 192 U/L    CK - MB 1.1 <3.6 NG/ML    CK-MB Index 1.5 0 - 2.5     TSH 3RD GENERATION    Collection Time: 12/29/17  6:18 AM   Result Value Ref Range    TSH 0.66 0.36 - 3.74 uIU/mL   HEMOGLOBIN A1C WITH EAG    Collection Time: 12/29/17  6:18 AM   Result Value Ref Range    Hemoglobin A1c 8.3 (H) 4.2 - 6.3 %    Est. average glucose 192 mg/dL   AMYLASE    Collection Time: 12/29/17  6:18 AM   Result Value Ref Range    Amylase 90 25 - 115 U/L   LIPASE    Collection Time: 12/29/17  6:18 AM   Result Value Ref Range    Lipase 166 73 - 393 U/L   D DIMER    Collection Time: 12/29/17  6:18 AM   Result Value Ref Range    D-dimer 0.26 0.00 - 0.65 mg/L FEU   TROPONIN I Collection Time: 12/29/17  6:18 AM   Result Value Ref Range    Troponin-I, Qt. <0.04 <0.05 ng/mL   TROPONIN I    Collection Time: 12/29/17  6:18 AM   Result Value Ref Range    Troponin-I, Qt. <0.04 <0.05 ng/mL   GLUCOSE, POC    Collection Time: 12/29/17  7:12 AM   Result Value Ref Range    Glucose (POC) 185 (H) 65 - 100 mg/dL    Performed by Joselin Osborn MD

## 2017-12-29 NOTE — ED PROVIDER NOTES
HPI Comments: 74 y.o. female with past medical history significant for HTN, DM, MS, Stroke, GERD, Arthritis, Hep C, Hysterectomy who presents from home with chief complaint of chest pain. Pt reports intermittent sharp chest pain x 4 days. She describes the pain as midsternal of which comes and goes, lasting for seconds at a time. tonight, the pain was more severe, to left chest radiating into left shoulder accompanied by right jaw pain and nausea. She notes intermittent diaphoresis with the sharp pains in the past. She is currently pain free. Pt also c/o b/l ankle swelling. Pt states that she initally noticed symptoms while having a difficult conversation with a relative. She notes hx of similar pain approximately 8 months ago but was not evaluated at that time. She takes a baby aspirin daily. Pt states that her last stress test was approximately 4-5 years ago. Pt denies recent travel or hormone therapy. She further denies calf pain, SOB, fever, chills, vomiting, abdominal pain, urinary symptoms, headache, neck pain, back pain. There are no other acute medical concerns at this time. Social hx: Non-smoker  Significant FMHx - MI  PCP: Radames Zacarias DO    Note written by Susan Schmidt, as dictated by Darlene Llanos MD 1:06 AM      The history is provided by the patient. No  was used. Past Medical History:   Diagnosis Date    Arthritis     OSTEO A & OSTEOPOROSIS     Depression     Diabetes ([facility] [address]     GERD (gastroesophageal reflux disease)     Hepatitis C     Hypertension     LBP (low back pain)     Lipoma 9/3/2013    Liver disease     CHRONIC HEP C    MS (multiple sclerosis) (HCC)     Other ill-defined conditions(799.89)     MS    Other ill-defined conditions(799.89)     HX DRUG ABUSE. CLEAN FOR 21 YRS.  Psychiatric disorder     DEPRESSION.     Stroke [facility])     Diagnosed 10/2011    Urinary incontinence        Past Surgical History:   Procedure Laterality Date    HX GI      COLONOSCOPY X 1    HX HYSTERECTOMY      HX LIPOMA RESECTION  9/6/13     Excision of lipoma, left posterior thigh         Family History:   Problem Relation Age of Onset    Arthritis-rheumatoid Mother     Hypertension Mother     Cancer Father     Breast Cancer Maternal Aunt     Breast Cancer Maternal Aunt        Social History     Social History    Marital status: SINGLE     Spouse name: N/A    Number of children: N/A    Years of education: N/A     Occupational History    Not on file. Social History Main Topics    Smoking status: Never Smoker    Smokeless tobacco: Never Used    Alcohol use No    Drug use: No    Sexual activity: No     Other Topics Concern    Not on file     Social History Narrative         ALLERGIES: Pcn [penicillins]; Fosamax [alendronate]; and Sulfa (sulfonamide antibiotics)    Review of Systems   Constitutional: Positive for diaphoresis. Negative for chills and fever. HENT: Negative for congestion. Respiratory: Negative for cough and shortness of breath. Cardiovascular: Positive for chest pain and leg swelling (ankles b/l). Gastrointestinal: Positive for nausea. Negative for abdominal pain and vomiting. Genitourinary: Negative for difficulty urinating and dysuria. Musculoskeletal: Negative for back pain and neck pain. Neurological: Negative for headaches. All other systems reviewed and are negative. Vitals:    12/28/17 2236   BP: 154/82   Pulse: 64   Resp: 16   Temp: 97.7 °F (36.5 °C)   SpO2: 96%   Weight: 78.5 kg (173 lb)   Height: 5' 4\" (1.626 m)            Physical Exam   Constitutional: She is oriented to person, place, and time. She appears well-developed and well-nourished. HENT:   Head: Normocephalic and atraumatic. Nose: Nose normal.   Eyes: Conjunctivae and EOM are normal. Pupils are equal, round, and reactive to light. Neck: Normal range of motion. Neck supple.    Cardiovascular: Normal rate, regular rhythm, normal heart sounds and intact distal pulses. Pulmonary/Chest: Effort normal and breath sounds normal.   Abdominal: Soft. Bowel sounds are normal.   Musculoskeletal: Normal range of motion. Neurological: She is oriented to person, place, and time. She has normal reflexes. Skin: Skin is warm and dry. Psychiatric: She has a normal mood and affect. Her behavior is normal.   Nursing note and vitals reviewed. Note written by Susan Garcia, as dictated by Lilian Mcnair MD 1:06 AM    [facility]  ED Course       Procedures     ED EKG interpretation:  Rhythm: normal sinus rhythm; and regular . Rate (approx.): 64; Axis: left axis deviation; P wave: normal; QRS interval: normal ; ST/T wave: non-specific changes;. This EKG was interpreted by Lilian Mcnair MD,ED Provider. CONSULT NOTE:  1:04 AM Lilian Mcnair MD spoke with Dr. Michael Arnold, Consult for Hospitalist.  Discussed available diagnostic tests and clinical findings. Dr. Michael Arnold will see and admit patient for further evaluation.

## 2017-12-29 NOTE — ED TRIAGE NOTES
I have been having pain in my chest on and off since Jonah day. Pain more intensed and steady since yesterday. Tonight I started feeling SOB and had pain R jaw area.

## 2017-12-29 NOTE — DISCHARGE SUMMARY
Discharge Summary       PATIENT ID: Cindi Hogan  MRN: [id number]   YOB: 1950    DATE OF ADMISSION: 12/28/2017 11:48 PM    DATE OF DISCHARGE: 12/29/2017   PRIMARY CARE PROVIDER: Sally Sebastian DO     ATTENDING PHYSICIAN: Neva Poe  DISCHARGING PROVIDER: Yen Barton NP    To contact this individual call [phone] and ask the  to page. If unavailable ask to be transferred the Adult Hospitalist Department. CONSULTATIONS: IP CONSULT TO CARDIOLOGY    PROCEDURES/SURGERIES: * No surgery found *    ADMITTING DIAGNOSES & HOSPITAL COURSE:   Dx: Chest pain, HTN    67-yof with a past medical history significant for depression, hypertension, multiple sclerosis, hepatitis C infection and type 2 diabetes, who was in her usual state of health until about 4 days ago, when the patient started experiencing chest pain. The patient described the chest pain as intermittent sharp pain, each episode lasting a few seconds. The pain is located at the center of the chest with radiation to the right jaw, associated with nausea, 8/10 in severity. No known aggravating or relieving factors. The chest pain is also associated with diaphoresis. The patient stated that she had a similar episode about 8 months ago, and did not receive any evaluation. She had a stress test done about 5 years ago that was negative. The chest pain is not associated with fever, rigors or chills. No associated shortness of breath. The patient came to the emergency room for further evaluation. When the patient arrived at the emergency room, the first set of troponin was negative. The patient was subsequently referred to the hospitalist service for evaluation for admission. Chest pain resolved. Stress test showed At stress, there is a small focus of diminished activity apex distal lateralwall which improves mildly with rest suggesting a small focus of ischemia. Left ventricular ejection fraction is 52 %. Discussed with Dr Hyla Burkitt with cardiology, findings likely from artifact, no need for inpatient cath. Patient to follow up with cardiology OP. Patient stable for discharge home. DISCHARGE DIAGNOSES / PLAN:      Chest pain-resolved  -likely from stress   -cardiology following  -troponin negative  -stress test showing artifact  -echo normal    HTN   -elevated on admission  -continue home Norvasc, Clonidine, Metoprolol   -advised pt to monitor BP at home and to f/u with pcp    Type 2 DM   -resume home meds     Multiple Sclerosis   -resume home meds     Hep C   -continue OP f/u with pcp     Depression   -resume home meds     Hypokalemia   -replenished   -repeat labs with pcp       PENDING TEST RESULTS:   At the time of discharge the following test results are still pending:     FOLLOW UP APPOINTMENTS:    Follow-up Information     Follow up With Details Comments [address], DO In 1 week hospital follow up [address] [facility]  [address] [address]      Sumeet Jordan MD In 1 week follow up with cardiology  [address]  [phone]           ADDITIONAL CARE RECOMMENDATIONS:   -Follow up with your primary care and cardiology within 1 week. -Check your blood pressure twice daily and record. Take this record with you to your follow up appointments. DIET: Diabetic    ACTIVITY: as tolerated    WOUND CARE: none    EQUIPMENT needed: none      DISCHARGE MEDICATIONS:  Current Discharge Medication List      CONTINUE these medications which have NOT CHANGED    Details   aspirin 81 mg chewable tablet Take 81 mg by mouth daily. interferon beta-1a (AVONEX) 30 mcg/0.5 mL injection 30 mcg by IntraMUSCular route every Monday. ammonium lactate (LAC-HYDRIN) 12 % lotion Apply  to affected area two (2) times a day. rub in to affected area well   Indications: on legs      ascorbic acid, vitamin C, (VITAMIN C) 500 mg tablet Take 500 mg by mouth daily. metoprolol succinate (TOPROL-XL) 200 mg XL tablet TAKE 1 TABLET BY MOUTH EVERY DAY  Qty: 30 Tab, Refills: 5      amLODIPine (NORVASC) 10 mg tablet TAKE 1 TABLET BY MOUTH EVERY DAY( need appt for further refills)  Qty: 90 Tab, Refills: 0      cloNIDine HCl (CATAPRES) 0.1 mg tablet TAKE 1 TABLET BY MOUTH TWICE A DAY(need appt for future refills)  Qty: 180 Tab, Refills: 0      BASAGLAR KWIKPEN 100 unit/mL (3 mL) inpn 10 UNITS AT BEDTIME  Qty: 15 Pen, Refills: 0      esomeprazole (NEXIUM 24HR) 20 mg capsule Take 1 Cap by mouth daily. Indications: gastroesophageal reflux disease  Qty: 30 Cap, Refills: 5    Associated Diagnoses: Gastroesophageal reflux disease, esophagitis presence not specified      JANUVIA 100 mg tablet TAKE 1 TABLET BY MOUTH DAILY  Qty: 30 Tab, Refills: 5      citalopram (CELEXA) 20 mg tablet TAKE 1 TABLET BY MOUTH EVERY DAY  Qty: 30 Tab, Refills: 5      calcium citrate-vitamin D3 (CITRACAL WITH VITAMIN D MAXIMUM) tablet 1 Tab daily. zolpidem (AMBIEN) 5 mg tablet Take 1 Tab by mouth nightly as needed for Sleep. Qty: 30 Tab, Refills: 0      risedronate (ACTONEL) 150 mg tablet Take 1 Tab by mouth every thirty (30) days. Qty: 3 Tab, Refills: 3      glipiZIDE (GLUCOTROL) 10 mg tablet TAKE 1 TAB BY MOUTH BEFORE BREAKFAST AND DINNER. Qty: 60 Tab, Refills: 11      meclizine (ANTIVERT) 25 mg tablet Take 1 Tab by mouth every eight (8) hours as needed. Qty: 30 Tab, Refills: 0         STOP taking these medications       AVONEX 30 mcg/0.5 mL pnkt Comments:   Reason for Stopping:                 NOTIFY YOUR PHYSICIAN FOR ANY OF THE FOLLOWING:   Fever over 101 degrees for 24 hours. Chest pain, shortness of breath, fever, chills, nausea, vomiting, diarrhea, change in mentation, falling, weakness, bleeding. Severe pain or pain not relieved by medications. Or, any other signs or symptoms that you may have questions about.     DISPOSITION:   X Home With:   OT  PT  HH  RN       Long term SNF/Inpatient Rehab Independent/assisted living    Hospice    Other:       PATIENT CONDITION AT DISCHARGE:     Functional status    Poor     Deconditioned    X Independent      Cognition    X Lucid     Forgetful     Dementia      Catheters/lines (plus indication)    Keller     PICC     PEG    X None      Code status    X Full code     DNR      PHYSICAL EXAMINATION AT DISCHARGE:  General: No acute distress, cooperative, pleasant   EENT: EOMI. Anicteric sclerae. Oral mucous moist, oropharynx benign  Resp: CTA bilaterally. No wheezing/rhonchi/rales. No accessory muscle use  CV: Regular rhythm, normal rate, no murmurs, gallops, rubs  GI: Soft, non distended, non tender. normoactive bowel sounds,   Extremities: No edema, warm, 2+ pulses throughout  Neurologic: Moves all extremities. AAOx3, CN II-XII grossly intact  Psych: Good insight. Not anxious nor agitated.   Skin: Good Turgor, no rashes or ulcers      CHRONIC MEDICAL DIAGNOSES:  Problem List as of 12/29/2017  Date Reviewed: 12/29/2017          Codes Class Noted - Resolved    * (Principal)Chest pain ICD-10-CM: R07.9  ICD-9-CM: 786.50  12/29/2017 - Present        Primary insomnia ICD-10-CM: F51.01  ICD-9-CM: 307.42  7/14/2017 - Present        Osteoporosis ICD-10-CM: M81.0  ICD-9-CM: 733.00  10/25/2016 - Present        Right upper lobe pneumonia ([facility] ICD-10-CM: J18.1  ICD-9-CM: 197  1/1/2015 - Present        Bilateral hip pain ICD-10-CM: M25.551, M25.552  ICD-9-CM: 719.45  11/14/2014 - Present        Lipoma ICD-10-CM: D17.9  ICD-9-CM: 214.9  9/3/2013 - Present        Hep C w/o coma, chronic (HCC) ICD-10-CM: B18.2  ICD-9-CM: 070.54  7/13/2012 - Present        Right lateral epicondylitis ICD-10-CM: M77.11  ICD-9-CM: 726.32  7/13/2012 - Present        Right lateral epicondylitis ICD-10-CM: M77.11  ICD-9-CM: 726.32  12/27/2011 - Present        HA (headache) ICD-10-CM: R51  ICD-9-CM: 784.0  2/7/2011 - Present        Depression ICD-10-CM: F32.9  ICD-9-CM: 355  12/10/2010 - Present        HTN (hypertension) ICD-10-CM: I10  ICD-9-CM: 401.9  3/30/2010 - Present        Fatigue ICD-10-CM: R53.83  ICD-9-CM: 780.79  3/30/2010 - Present        MS (multiple sclerosis) ([facility] ICD-10-CM: G35  ICD-9-CM: 838  3/30/2010 - Present        DM (diabetes mellitus) ([facility] ICD-10-CM: E11.9  ICD-9-CM: 250.00  3/30/2010 - Present        RA (rheumatoid arthritis) ([facility] ICD-10-CM: M06.9  ICD-9-CM: 714.0  3/30/2010 - Present        RESOLVED: Sepsis ([facility] ICD-10-CM: A41.9  ICD-9-CM: 038.9, 995.91  1/1/2015 - 1/4/2015              Greater than 30 minutes were spent with the patient on counseling and coordination of care    Signed:   Fernando Sanchez NP  12/29/2017  12:51 PM

## 2018-01-03 RX ORDER — INTERFERON BETA-1A 30 UG/.5ML
30 INJECTION, SOLUTION INTRAMUSCULAR
Qty: 1 KIT
Start: 2018-01-08

## 2018-01-03 NOTE — TELEPHONE ENCOUNTER
Requested Prescriptions     Pending Prescriptions Disp Refills    interferon beta-1a (AVONEX) 30 mcg/0.5 mL injection 1 Kit      Si.5 mL by IntraMUSCular route every Monday.

## 2018-01-03 NOTE — TELEPHONE ENCOUNTER
Spoke patient, informed her medication Rx was faxed to [facility] yesterday, if any issues to call back. She verbalized understanding.

## 2018-01-19 RX ORDER — ESOMEPRAZOLE MAGNESIUM 20 MG/1
TABLET ORAL
Qty: 6 TAB | Refills: 0 | Status: SHIPPED | OUTPATIENT
Start: 2018-01-19 | End: 2018-02-08 | Stop reason: SDUPTHER

## 2018-01-29 ENCOUNTER — TELEPHONE (OUTPATIENT)
Dept: NEUROLOGY | Age: 68
End: 2018-01-29

## 2018-01-29 NOTE — TELEPHONE ENCOUNTER
----- Message from Feli Minaya sent at 1/29/2018 12:15 PM EST -----  Regarding: Dr. Soriano/Rx/Telephone  Pt is requesting 12 mth supply, new Rx for Avonex to be faxed over to [address] [place] [address] fax [phone] ph [phone]. Pt stated she needs this as soon as possible she is on her last injections. Pt  Best contact number is [phone].

## 2018-01-29 NOTE — TELEPHONE ENCOUNTER
Spoke with patient, informed her of verbal order provided to [facility]. She verbalized understanding.

## 2018-01-29 NOTE — TELEPHONE ENCOUNTER
Spoke with Armond Cortes pharmacist for [facility], provided verbal order for Avonex per .

## 2018-02-09 RX ORDER — ESOMEPRAZOLE MAGNESIUM 20 MG/1
TABLET ORAL
Qty: 90 TAB | Refills: 0 | Status: SHIPPED | OUTPATIENT
Start: 2018-02-09 | End: 2018-11-08 | Stop reason: DRUGHIGH

## 2018-02-14 RX ORDER — CLONIDINE HYDROCHLORIDE 0.1 MG/1
TABLET ORAL
Qty: 60 TAB | Refills: 0 | Status: SHIPPED | OUTPATIENT
Start: 2018-02-14 | End: 2018-03-18 | Stop reason: SDUPTHER

## 2018-02-14 RX ORDER — AMLODIPINE BESYLATE 10 MG/1
TABLET ORAL
Qty: 30 TAB | Refills: 0 | Status: SHIPPED | OUTPATIENT
Start: 2018-02-14 | End: 2018-03-18 | Stop reason: SDUPTHER

## 2018-03-01 ENCOUNTER — TELEPHONE (OUTPATIENT)
Dept: NEUROLOGY | Age: 68
End: 2018-03-01

## 2018-03-19 RX ORDER — AMLODIPINE BESYLATE 10 MG/1
TABLET ORAL
Qty: 30 TAB | Refills: 0 | Status: SHIPPED | OUTPATIENT
Start: 2018-03-19 | End: 2018-04-30 | Stop reason: SDUPTHER

## 2018-03-19 RX ORDER — CLONIDINE HYDROCHLORIDE 0.1 MG/1
TABLET ORAL
Qty: 60 TAB | Refills: 0 | Status: SHIPPED | OUTPATIENT
Start: 2018-03-19 | End: 2018-04-30 | Stop reason: SDUPTHER

## 2018-03-20 RX ORDER — SITAGLIPTIN 100 MG/1
TABLET, FILM COATED ORAL
Qty: 30 TAB | Refills: 0 | Status: SHIPPED | OUTPATIENT
Start: 2018-03-20 | End: 2018-04-30 | Stop reason: SDUPTHER

## 2018-04-03 ENCOUNTER — TELEPHONE (OUTPATIENT)
Dept: NEUROLOGY | Age: 68
End: 2018-04-03

## 2018-04-03 NOTE — TELEPHONE ENCOUNTER
----- Message from [facility] sent at 4/3/2018  2:36 PM EDT -----  Regarding: Dr. Destinee Rogers, from [address] , called because a new prescription for the pt's \"avonex\" 30 mcg 0.5 ml is needed. [address]  [phone]; [phone].

## 2018-04-12 RX ORDER — CITALOPRAM 20 MG/1
TABLET, FILM COATED ORAL
Qty: 46 TAB | Refills: 0 | Status: SHIPPED | OUTPATIENT
Start: 2018-04-12 | End: 2018-04-30 | Stop reason: SDUPTHER

## 2018-04-30 ENCOUNTER — OFFICE VISIT (OUTPATIENT)
Dept: INTERNAL MEDICINE CLINIC | Age: 68
End: 2018-04-30

## 2018-04-30 VITALS
RESPIRATION RATE: 19 BRPM | BODY MASS INDEX: 28.17 KG/M2 | HEART RATE: 60 BPM | WEIGHT: 165 LBS | DIASTOLIC BLOOD PRESSURE: 110 MMHG | OXYGEN SATURATION: 98 % | HEIGHT: 64 IN | SYSTOLIC BLOOD PRESSURE: 170 MMHG

## 2018-04-30 DIAGNOSIS — M65.351 TRIGGER LITTLE FINGER OF RIGHT HAND: ICD-10-CM

## 2018-04-30 DIAGNOSIS — F33.42 RECURRENT MAJOR DEPRESSIVE DISORDER, IN FULL REMISSION (HCC): ICD-10-CM

## 2018-04-30 DIAGNOSIS — I10 ESSENTIAL HYPERTENSION: ICD-10-CM

## 2018-04-30 DIAGNOSIS — G35 MS (MULTIPLE SCLEROSIS) (HCC): ICD-10-CM

## 2018-04-30 DIAGNOSIS — M06.9 RHEUMATOID ARTHRITIS, INVOLVING UNSPECIFIED SITE, UNSPECIFIED RHEUMATOID FACTOR PRESENCE: ICD-10-CM

## 2018-04-30 DIAGNOSIS — E11.9 TYPE 2 DIABETES MELLITUS WITHOUT COMPLICATION, WITHOUT LONG-TERM CURRENT USE OF INSULIN (HCC): Primary | ICD-10-CM

## 2018-04-30 DIAGNOSIS — B18.2 HEP C W/O COMA, CHRONIC (HCC): ICD-10-CM

## 2018-04-30 RX ORDER — METOPROLOL SUCCINATE 200 MG/1
TABLET, EXTENDED RELEASE ORAL
Qty: 30 TAB | Refills: 5 | Status: SHIPPED | OUTPATIENT
Start: 2018-04-30 | End: 2018-08-27 | Stop reason: SDUPTHER

## 2018-04-30 RX ORDER — CITALOPRAM 20 MG/1
TABLET, FILM COATED ORAL
Qty: 30 TAB | Refills: 5 | Status: SHIPPED | OUTPATIENT
Start: 2018-04-30 | End: 2018-08-27 | Stop reason: SDUPTHER

## 2018-04-30 RX ORDER — INSULIN GLARGINE 100 [IU]/ML
INJECTION, SOLUTION SUBCUTANEOUS
Qty: 15 PEN | Refills: 5 | Status: SHIPPED | OUTPATIENT
Start: 2018-04-30 | End: 2018-07-24 | Stop reason: CLARIF

## 2018-04-30 RX ORDER — AMLODIPINE BESYLATE 10 MG/1
TABLET ORAL
Qty: 30 TAB | Refills: 5 | Status: SHIPPED | OUTPATIENT
Start: 2018-04-30 | End: 2018-10-22 | Stop reason: SDUPTHER

## 2018-04-30 RX ORDER — RISEDRONATE SODIUM 150 MG/1
150 TABLET, FILM COATED ORAL
Qty: 3 TAB | Refills: 3 | Status: SHIPPED | OUTPATIENT
Start: 2018-04-30 | End: 2018-08-29 | Stop reason: SDUPTHER

## 2018-04-30 RX ORDER — CLONIDINE HYDROCHLORIDE 0.1 MG/1
TABLET ORAL
Qty: 60 TAB | Refills: 5 | Status: SHIPPED | OUTPATIENT
Start: 2018-04-30 | End: 2018-08-27 | Stop reason: SDUPTHER

## 2018-04-30 NOTE — PROGRESS NOTES
Health Maintenance Due   Topic Date Due    EYE EXAM RETINAL OR DILATED Q1  07/18/1962    COLONOSCOPY  07/18/1970    DTaP/Tdap/Td series (1 - Tdap) 07/18/1973    ZOSTER VACCINE AGE 60>  05/18/2012    MICROALBUMIN Q1  06/23/2016    GLAUCOMA SCREENING Q2Y  07/18/2017    Pneumococcal 65+ Low/Medium Risk (1 of 2 - PCV13) 07/18/2017    FOOT EXAM Q1  10/25/2017       Chief Complaint   Patient presents with    Finger Pain    Abdominal Pain    Hypertension    Diabetes       1. Have you been to the ER, urgent care clinic since your last visit? Hospitalized since your last visit? No    2. Have you seen or consulted any other health care providers outside of the [address] [place] [address] since your last visit? Include any pap smears or colon screening. No    3) Do you have an Advance Directive on file? no    4) Are you interested in receiving information on Advance Directives? NO      Patient is accompanied by self I have received verbal consent from Luis Pozo to discuss any/all medical information while they are present in the room.     [facility]  OFFICE PROCEDURE PROGRESS NOTE        Chart reviewed for the following:   IJenae LPN, have reviewed the History, Physical and updated the Allergic reactions for [address] performed immediately prior to start of procedure:   Ayde Darby LPN, have performed the following reviews on Luis Pozo prior to the start of the procedure:            * Patient was identified by name and date of birth   * Agreement on procedure being performed was verified  * Risks and Benefits explained to the patient  * Procedure site verified and marked as necessary  * Patient was positioned for comfort  * Consent was signed and verified     Time: 11:30AM      Date of procedure: 4/30/2018    Procedure performed by:  Nuha Garcia DO    Provider assisted by: Victorina Carr LPN    Patient assisted by: self    How tolerated by patient: tolerated the procedure well with no complications    Post Procedural Pain Scale: 4 - Hurts Little More    Comments: none

## 2018-04-30 NOTE — MR AVS SNAPSHOT
[address] [facility] [address] [place] [address] 
[phone] Patient: Sharon Maurice MRN:  [id number] Visit Information Date & Time Provider Department Dept. Phone Encounter #  
 4/30/2018 11:15 AM [facility] Internal [facility] [phone] [id number] Follow-up Instructions Return in about 4 months (around 8/30/2018). Upcoming Health Maintenance Date Due  
 EYE EXAM RETINAL OR DILATED Q1 7/18/1962 COLONOSCOPY 7/18/1970 DTaP/Tdap/Td series (1 - Tdap) 7/18/1973 ZOSTER VACCINE AGE 60> 5/18/2012 MICROALBUMIN Q1 6/23/2016 GLAUCOMA SCREENING Q2Y 7/18/2017 Pneumococcal 65+ Low/Medium Risk (1 of 2 - PCV13) 7/18/2017 HEMOGLOBIN A1C Q6M 6/29/2018 Influenza Age 5 to Adult 8/1/2018 BREAST CANCER SCRN MAMMOGRAM 10/17/2018 LIPID PANEL Q1 12/29/2018 FOOT EXAM Q1 4/30/2019 Allergies as of 4/30/2018  Review Complete On: 4/30/2018 By: Sumit Rogers DO Severity Noted Reaction Type Reactions Pcn [Penicillins] High 02/02/2010   Intolerance Swelling Fosamax [Alendronate] Medium 02/02/2010   Intolerance Swelling Sulfa (Sulfonamide Antibiotics) Low 02/02/2010   Topical Itching Current Immunizations  Reviewed on 10/25/2016 Name Date Influenza Nasal Vaccine 9/25/2016 Pneumococcal Polysaccharide (PPSV-23) 1/4/2015 10:52 AM  
  
 Not reviewed this visit You Were Diagnosed With   
  
 Codes Comments Type 2 diabetes mellitus without complication, without long-term current use of insulin (HCC)    -  Primary ICD-10-CM: E11.9 ICD-9-CM: 250.00 Essential hypertension     ICD-10-CM: I10 
ICD-9-CM: 401.9 MS (multiple sclerosis) ([facility]     ICD-10-CM: G35 
ICD-9-CM: 727 Rheumatoid arthritis, involving unspecified site, unspecified rheumatoid factor presence ([facility]     ICD-10-CM: M06.9 ICD-9-CM: 714.0 Hep C w/o coma, chronic (HCC)     ICD-10-CM: B18.2 ICD-9-CM: 070.54 Recurrent major depressive disorder, in full remission ([facility]     ICD-10-CM: F33.42 
ICD-9-CM: 296.36 Trigger little finger of right hand     ICD-10-CM: M65.351 ICD-9-CM: 727.03 Vitals BP Pulse Resp Height(growth percentile) Weight(growth percentile) SpO2  
 (!) 170/110 (BP 1 Location: Right arm, BP Patient Position: Sitting) 60 19 5' 4\" (1.626 m) 165 lb (74.8 kg) 98% BMI OB Status Smoking Status 28.32 kg/m2 Hysterectomy Never Smoker Vitals History BMI and BSA Data Body Mass Index Body Surface Area  
 28.32 kg/m 2 1.84 m 2 Preferred Pharmacy Pharmacy Name Phone [facility] [address] [address] [phone] Your Updated Medication List  
  
   
This list is accurate as of 4/30/18 12:05 PM.  Always use your most recent med list. amLODIPine 10 mg tablet Commonly known as:  Kelly Ape TAKE 1 TABLET BY MOUTH EVERY DAY  
  
 ammonium lactate 12 % lotion Commonly known as:  LAC-HYDRIN Apply  to affected area two (2) times a day. rub in to affected area well   Indications: on legs  
  
 aspirin 81 mg chewable tablet Take 81 mg by mouth daily. calcium citrate-vitamin D3 tablet Commonly known as:  CITRACAL WITH VITAMIN D MAXIMUM  
1 Tab daily. citalopram 20 mg tablet Commonly known as:  CELEXA  
TAKE 1 TABLET BY MOUTH EVERY DAY  
  
 cloNIDine HCl 0.1 mg tablet Commonly known as:  CATAPRES  
TAKE 1 TABLET BY MOUTH TWICE A DAY  
  
 glipiZIDE 10 mg tablet Commonly known as:  GLUCOTROL  
TAKE 1 TAB BY MOUTH BEFORE BREAKFAST AND DINNER. insulin glargine 100 unit/mL (3 mL) Inpn Commonly known as:  BASAGLAR KWIKPEN U-100 INSULIN  
10 UNITS AT BEDTIME  
  
 interferon beta-1a 30 mcg/0.5 mL Pnij Commonly known as:  AVONEX  
0.5 mL by IntraMUSCular route every seven (7) days  
  
 methylPREDNISolone acetate 20 mg/mL Susp Commonly known as:  DEPO-MEDROL  
0.5 mL by Intra artICUlar route once for 1 dose. metoprolol succinate 200 mg XL tablet Commonly known as:  TOPROL-XL  
TAKE 1 TABLET BY MOUTH EVERY DAY NexIUM 24HR 20 mg Tbec Generic drug:  esomeprazole magnesium TAKE 1 TABLET BY MOUTH DAILY  
  
 risedronate 150 mg tablet Commonly known as:  ACTONEL Take 1 Tab by mouth every thirty (30) days. SITagliptin 100 mg tablet Commonly known as:  [place] Fast TAKE 1 TABLET BY MOUTH EVERY DAY  
  
 VITAMIN C 500 mg tablet Generic drug:  ascorbic acid (vitamin C) Take 500 mg by mouth daily. zolpidem 5 mg tablet Commonly known as:  AMBIEN Take 1 Tab by mouth nightly as needed for Sleep. Prescriptions Sent to Pharmacy Refills  
 citalopram (CELEXA) 20 mg tablet 5 Sig: TAKE 1 TABLET BY MOUTH EVERY DAY Class: Normal  
 Pharmacy: [facility] [address] Ph #: [phone] SITagliptin (JANUVIA) 100 mg tablet 5 Sig: TAKE 1 TABLET BY MOUTH EVERY DAY Class: Normal  
 Pharmacy: [facility] [address] Ph #: [phone]  
 amLODIPine (NORVASC) 10 mg tablet 5 Sig: TAKE 1 TABLET BY MOUTH EVERY DAY Class: Normal  
 Pharmacy: [facility] [address] Ph #: [phone]  
 cloNIDine HCl (CATAPRES) 0.1 mg tablet 5 Sig: TAKE 1 TABLET BY MOUTH TWICE A DAY Class: Normal  
 Pharmacy: [facility] [address] Ph #: [phone]  
 metoprolol succinate (TOPROL-XL) 200 mg XL tablet 5 Sig: TAKE 1 TABLET BY MOUTH EVERY DAY Class: Normal  
 Pharmacy: [facility] [address] Ph #: [phone] insulin glargine (BASAGLAR KWIKPEN U-100 INSULIN) 100 unit/mL (3 mL) inpn 5 Sig: 10 UNITS AT BEDTIME Class: Normal  
 Pharmacy: [address] Ph #: [phone] risedronate (ACTONEL) 150 mg tablet 3 Sig: Take 1 Tab by mouth every thirty (30) days. Class: Normal  
 Pharmacy: [address] Ph #: [phone] Route: Oral  
  
We Performed the Following HEMOGLOBIN A1C WITH EAG [98066 CPT(R)] INJECT TRIGGER POINT, 1 OR 2 [id number] CPT(R)] LIPID PANEL [97767 CPT(R)] METABOLIC PANEL, COMPREHENSIVE [57152 CPT(R)] METHYLPREDNISOLONE ACETATE INJECTION 20 MG [ [facility]] MICROALBUMIN, UR, RAND W/ MICROALB/CREAT RATIO [id number] CPT(R)] WV THER/PROPH/DIAG INJECTION, SUBCUT/IM [id number] CPT(R)] Follow-up Instructions Return in about 4 months (around 8/30/2018). Introducing [facility]! [facility] introduces Pops patient portal. Now you can access parts of your medical record, email your doctor's office, and request medication refills online. 1. In your internet browser, go to [email or web address] 2. Click on the First Time User? Click Here link in the Sign In box. You will see the New Member Sign Up page. 3. Enter your Pops Access Code exactly as it appears below. You will not need to use this code after youve completed the sign-up process. If you do not sign up before the expiration date, you must request a new code. · Pops Access Code: [email or web address] Expires: 7/29/2018 12:05 PM 
 
4. Enter the last four digits of your Social Security Number (xxxx) and Date of Birth (mm/dd/yyyy) as indicated and click Submit. You will be taken to the next sign-up page. 5. Create a Pops ID.  This will be your MyChart login ID and cannot be changed, so think of one that is secure and easy to remember. 6. Create a [employer] password. You can change your password at any time. 7. Enter your Password Reset Question and Answer. This can be used at a later time if you forget your password. 8. Enter your e-mail address. You will receive e-mail notification when new information is available in [address] 9. Click Sign Up. You can now view and download portions of your medical record. 10. Click the Download Summary menu link to download a portable copy of your medical information. If you have questions, please visit the Frequently Asked Questions section of the [employer] website. Remember, [employer] is NOT to be used for urgent needs. For medical emergencies, dial 911. Now available from your iPhone and Android! Please provide this summary of care documentation to your next provider. Your primary care clinician is listed as Yuki Khan. If you have any questions after today's visit, please call [phone].

## 2018-04-30 NOTE — PATIENT INSTRUCTIONS
Joint Injections: Care Instructions  Your Care Instructions  Joint injections are shots into a joint, such as the knee. They may be used to put in medicines, such as pain relievers. Or they can be used to take out fluid. Sometimes the fluid is tested in a lab. This can help find the cause of a joint problem. A corticosteroid, or steroid, shot is used to reduce inflammation in tendons or joints. It is often used to treat problems such as arthritis, tendinitis, and bursitis. Steroids can be injected directly into a painful, inflamed joint. They can also help reduce inflammation of a bursa. A bursa is a sac of fluid. It cushions and lubricates areas where tendons, ligaments, skin, muscles, or bones rub against each other. A steroid shot can sometimes help with short-term pain relief when other treatments haven't worked. If steroid shots help, pain may improve for weeks or months. Follow-up care is a key part of your treatment and safety. Be sure to make and go to all appointments, and call your doctor if you are having problems. It's also a good idea to know your test results and keep a list of the medicines you take. How can you care for yourself at home? · Put ice or a cold pack on the area for 10 to 20 minutes at a time. Put a thin cloth between the ice and your skin. · Take anti-inflammatory medicines to reduce pain, swelling, or inflammation. These include ibuprofen (Advil, Motrin) and naproxen (Aleve). Read and follow all instructions on the label. · Avoid strenuous activities for several days, especially those that put stress on the area where you got the shot. · If you have dressings over the area, keep them clean and dry. You may remove them when your doctor tells you to. When should you call for help? Call your doctor now or seek immediate medical care if:  ? · You have signs of infection, such as:  ¨ Increased pain, swelling, warmth, or redness. ¨ Red streaks leading from the site.   ¨ Pus draining from the site. ¨ A fever. ? Watch closely for changes in your health, and be sure to contact your doctor if you have any problems. Where can you learn more? Go to http://addis-johnathon.info/. Enter N616 in the search box to learn more about \"Joint Injections: Care Instructions. \"  Current as of: March 21, 2017  Content Version: 11.4  © 8334-4189 [employer]. Care instructions adapted under license by [employer] (which disclaims liability or warranty for this information). If you have questions about a medical condition or this instruction, always ask your healthcare professional. [address] any warranty or liability for your use of this information.

## 2018-05-14 ENCOUNTER — OFFICE VISIT (OUTPATIENT)
Dept: INTERNAL MEDICINE CLINIC | Age: 68
End: 2018-05-14

## 2018-05-14 VITALS
BODY MASS INDEX: 28.68 KG/M2 | TEMPERATURE: 95.9 F | RESPIRATION RATE: 19 BRPM | DIASTOLIC BLOOD PRESSURE: 90 MMHG | HEIGHT: 64 IN | HEART RATE: 57 BPM | SYSTOLIC BLOOD PRESSURE: 150 MMHG | WEIGHT: 168 LBS | OXYGEN SATURATION: 98 %

## 2018-05-14 DIAGNOSIS — G35 MS (MULTIPLE SCLEROSIS) (HCC): ICD-10-CM

## 2018-05-14 DIAGNOSIS — E11.9 TYPE 2 DIABETES MELLITUS WITHOUT COMPLICATION, WITHOUT LONG-TERM CURRENT USE OF INSULIN (HCC): ICD-10-CM

## 2018-05-14 DIAGNOSIS — I10 ESSENTIAL HYPERTENSION: ICD-10-CM

## 2018-05-14 DIAGNOSIS — J30.2 SEASONAL ALLERGIC RHINITIS, UNSPECIFIED TRIGGER: Primary | ICD-10-CM

## 2018-05-14 DIAGNOSIS — B18.2 HEP C W/O COMA, CHRONIC (HCC): ICD-10-CM

## 2018-05-14 DIAGNOSIS — M06.9 RHEUMATOID ARTHRITIS, INVOLVING UNSPECIFIED SITE, UNSPECIFIED RHEUMATOID FACTOR PRESENCE: ICD-10-CM

## 2018-05-14 DIAGNOSIS — R06.09 DOE (DYSPNEA ON EXERTION): ICD-10-CM

## 2018-05-14 RX ORDER — ALBUTEROL SULFATE 90 UG/1
1 AEROSOL, METERED RESPIRATORY (INHALATION)
Qty: 1 INHALER | Refills: 1 | Status: SHIPPED | OUTPATIENT
Start: 2018-05-14 | End: 2018-08-24 | Stop reason: SDUPTHER

## 2018-05-14 RX ORDER — GUAIFENESIN 600 MG/1
600 TABLET, EXTENDED RELEASE ORAL 2 TIMES DAILY
Qty: 14 TAB | Refills: 0 | Status: SHIPPED | OUTPATIENT
Start: 2018-05-14 | End: 2018-08-14 | Stop reason: ALTCHOICE

## 2018-05-14 RX ORDER — FLUTICASONE PROPIONATE 50 MCG
2 SPRAY, SUSPENSION (ML) NASAL DAILY
Qty: 1 BOTTLE | Refills: 1 | Status: SHIPPED | OUTPATIENT
Start: 2018-05-14 | End: 2019-04-04

## 2018-05-14 NOTE — MR AVS SNAPSHOT
[address] [facility] [address] 
[phone] Patient: Bryan Buchanan MRN:  [id number] Visit Information Date & Time Provider Department Dept. Phone Encounter #  
 5/14/2018 11:00 AM Soraida Hernandez, 227 [facility] Internal Medicine [phone] [id number] Follow-up Instructions Return in about 4 months (around 9/14/2018). Upcoming Health Maintenance Date Due  
 EYE EXAM RETINAL OR DILATED Q1 7/18/1962 COLONOSCOPY 7/18/1970 DTaP/Tdap/Td series (1 - Tdap) 7/18/1973 ZOSTER VACCINE AGE 60> 5/18/2012 MICROALBUMIN Q1 6/23/2016 GLAUCOMA SCREENING Q2Y 7/18/2017 Pneumococcal 65+ Low/Medium Risk (1 of 2 - PCV13) 7/18/2017 HEMOGLOBIN A1C Q6M 6/29/2018 Influenza Age 5 to Adult 8/1/2018 BREAST CANCER SCRN MAMMOGRAM 10/17/2018 LIPID PANEL Q1 12/29/2018 FOOT EXAM Q1 4/30/2019 Allergies as of 5/14/2018  Review Complete On: 5/14/2018 By: Soraida Hernandez,  Severity Noted Reaction Type Reactions Pcn [Penicillins] High 02/02/2010   Intolerance Swelling Fosamax [Alendronate] Medium 02/02/2010   Intolerance Swelling Sulfa (Sulfonamide Antibiotics) Low 02/02/2010   Topical Itching Current Immunizations  Reviewed on 10/25/2016 Name Date Influenza Nasal Vaccine 9/25/2016 Pneumococcal Polysaccharide (PPSV-23) 1/4/2015 10:52 AM  
  
 Not reviewed this visit You Were Diagnosed With   
  
 Codes Comments Seasonal allergic rhinitis, unspecified trigger    -  Primary ICD-10-CM: J30.2 ICD-9-CM: 477.9 Type 2 diabetes mellitus without complication, without long-term current use of insulin (HCC)     ICD-10-CM: E11.9 ICD-9-CM: 250.00 Essential hypertension     ICD-10-CM: I10 
ICD-9-CM: 401.9 MS [facility]multiple sclerosis[facility] ([facility]     ICD-10-CM: G35 
ICD-9-CM: 861  Rheumatoid arthritis, involving unspecified site, unspecified rheumatoid factor presence ([facility]     ICD-10-CM: M06.9 ICD-9-CM: 714.0 Hep C w/o coma, chronic (HCC)     ICD-10-CM: B18.2 ICD-9-CM: 070.54 ANDERSON (dyspnea on exertion)     ICD-10-CM: R06.09 
ICD-9-CM: 786.09 Vitals BP Pulse Temp Resp Height(growth percentile) Weight(growth percentile) 150/90 (BP 1 Location: Right arm, BP Patient Position: Sitting) (!) 57 95.9 °F (35.5 °C) (Oral) 19 5' 4\" (1.626 m) 168 lb (76.2 kg) SpO2 BMI OB Status Smoking Status 98% 28.84 kg/m2 Hysterectomy Never Smoker Vitals History BMI and BSA Data Body Mass Index Body Surface Area  
 28.84 kg/m 2 1.85 m 2 Preferred Pharmacy Pharmacy Name Phone [email or web address] [phone] [address] [phone] Your Updated Medication List  
  
   
This list is accurate as of 5/14/18 11:22 AM.  Always use your most recent med list.  
  
  
  
  
 albuterol 90 mcg/actuation inhaler Commonly known as:  PROVENTIL HFA, VENTOLIN HFA, PROAIR HFA Take 1 Puff by inhalation every six (6) hours as needed for Wheezing. amLODIPine 10 mg tablet Commonly known as:  Valerie Raddle TAKE 1 TABLET BY MOUTH EVERY DAY  
  
 ammonium lactate 12 % lotion Commonly known as:  LAC-HYDRIN Apply  to affected area two (2) times a day. rub in to affected area well   Indications: on legs  
  
 aspirin 81 mg chewable tablet Take 81 mg by mouth daily. calcium citrate-vitamin D3 tablet Commonly known as:  CITRACAL WITH VITAMIN D MAXIMUM  
1 Tab daily. citalopram 20 mg tablet Commonly known as:  CELEXA  
TAKE 1 TABLET BY MOUTH EVERY DAY  
  
 cloNIDine HCl 0.1 mg tablet Commonly known as:  CATAPRES  
TAKE 1 TABLET BY MOUTH TWICE A DAY  
  
 fluticasone 50 mcg/actuation nasal spray Commonly known as:  Harry Calk 2 Sprays by Both Nostrils route daily. glipiZIDE 10 mg tablet Commonly known as:  [employer] TAKE 1 TAB BY MOUTH BEFORE BREAKFAST AND DINNER.  
  
 guaiFENesin  mg ER tablet Commonly known as:  Shai & Shai Take 1 Tab by mouth two (2) times a day. insulin glargine 100 unit/mL (3 mL) Inpn Commonly known as:  BASAGLMANUEL DARNELLPEN U-100 INSULIN  
10 UNITS AT BEDTIME  
  
 interferon beta-1a 30 mcg/0.5 mL Pnij Commonly known as:  AVONEX  
0.5 mL by IntraMUSCular route every seven (7) days  
  
 metoprolol succinate 200 mg XL tablet Commonly known as:  TOPROL-XL  
TAKE 1 TABLET BY MOUTH EVERY DAY NexIUM 24HR 20 mg Tbec Generic drug:  esomeprazole magnesium TAKE 1 TABLET BY MOUTH DAILY  
  
 risedronate 150 mg tablet Commonly known as:  ACTONEL Take 1 Tab by mouth every thirty (30) days. SITagliptin 100 mg tablet Commonly known as:  Emerson Valdivia TAKE 1 TABLET BY MOUTH EVERY DAY  
  
 VITAMIN C 500 mg tablet Generic drug:  ascorbic acid (vitamin C) Take 500 mg by mouth daily. zolpidem 5 mg tablet Commonly known as:  AMBIEN Take 1 Tab by mouth nightly as needed for Sleep. Prescriptions Sent to Pharmacy Refills  
 fluticasone (FLONASE) 50 mcg/actuation nasal spray 1 Si Sprays by Both Nostrils route daily. Class: Normal  
 Pharmacy: [facility] [address] Ph #: [phone] Route: Both Nostrils  
 albuterol (PROVENTIL HFA, VENTOLIN HFA, PROAIR HFA) 90 mcg/actuation inhaler 1 Sig: Take 1 Puff by inhalation every six (6) hours as needed for Wheezing. Class: Normal  
 Pharmacy: [facility] [address] Ph #: [phone] Route: Inhalation  
 guaiFENesin ER (MUCINEX) 600 mg ER tablet 0 Sig: Take 1 Tab by mouth two (2) times a day. Class: Normal  
 Pharmacy: [facility] [address] Ph #: [phone]  Route: Oral  
  
 Follow-up Instructions Return in about 4 months (around 9/14/2018). Introducing [facility] [place] [facility]! [facility] introduces Taltopia patient portal. Now you can access parts of your medical record, email your doctor's office, and request medication refills online. 1. In your internet browser, go to [email or web address] 2. Click on the First Time User? Click Here link in the Sign In box. You will see the New Member Sign Up page. 3. Enter your Taltopia Access Code exactly as it appears below. You will not need to use this code after youve completed the sign-up process. If you do not sign up before the expiration date, you must request a new code. · Taltopia Access Code: [email or web address] Expires: 7/29/2018 12:05 PM 
 
4. Enter the last four digits of your Social Security Number (xxxx) and Date of Birth (mm/dd/yyyy) as indicated and click Submit. You will be taken to the next sign-up page. 5. Create a Taltopia ID. This will be your Taltopia login ID and cannot be changed, so think of one that is secure and easy to remember. 6. Create a Taltopia password. You can change your password at any time. 7. Enter your Password Reset Question and Answer. This can be used at a later time if you forget your password. 8. Enter your e-mail address. You will receive e-mail notification when new information is available in [address] 9. Click Sign Up. You can now view and download portions of your medical record. 10. Click the Download Summary menu link to download a portable copy of your medical information. If you have questions, please visit the Frequently Asked Questions section of the Taltopia website. Remember, Taltopia is NOT to be used for urgent needs. For medical emergencies, dial 911. Now available from your iPhone and Android! Please provide this summary of care documentation to your next provider. Your primary care clinician is listed as Sherry Rayo. If you have any questions after today's visit, please call [phone].

## 2018-05-14 NOTE — PROGRESS NOTES
HISTORY OF PRESENT ILLNESS  Nicholas Camacho is a 74 y.o. female. She is here for follow-up. Has a few chronic medical issues. Reports a few days of congested cough and cold symptoms. Has allergy symptoms as well. Reports frontal headaches. BP has been stable. Followed by rheumatologist for RA. Followed by neurologist for MS. Reports better compliance with medications and diet. Med list the most recent studies reviewed. Last A1c in December was 8.3. She has not repeat labs as I recommended last visit. Denies any other new complaints. Cold Symptoms   Associated symptoms include headaches and sore throat. Pertinent negatives include no chest pain, no shortness of breath and no wheezing. Cough   Associated symptoms include headaches. Pertinent negatives include no chest pain, no abdominal pain and no shortness of breath. Headache   Associated symptoms include headaches. Pertinent negatives include no chest pain, no abdominal pain and no shortness of breath. Hypertension    Associated symptoms include headaches. Pertinent negatives include no chest pain, no blurred vision and no shortness of breath. Review of Systems   Constitutional: Negative. HENT: Positive for congestion and sore throat. Eyes: Negative. Negative for blurred vision. Respiratory: Positive for cough. Negative for hemoptysis, sputum production, shortness of breath and wheezing. Cardiovascular: Negative for chest pain and leg swelling. Gastrointestinal: Positive for heartburn. Negative for abdominal pain. Genitourinary: Negative for dysuria. Musculoskeletal: Positive for joint pain. Negative for falls. Skin: Negative. Neurological: Positive for tingling, sensory change and headaches. Negative for focal weakness. Endo/Heme/Allergies: Negative for polydipsia. Psychiatric/Behavioral: Positive for depression. The patient is nervous/anxious and has insomnia.     All other systems reviewed and are negative. Physical Exam   Constitutional: She is oriented to person, place, and time. She appears well-developed and well-nourished. No distress. overweight   HENT:   Head: Normocephalic and atraumatic. Nose: Nose normal.   Mouth/Throat: No oropharyngeal exudate. pnd w/ erythema   Eyes: Conjunctivae are normal. Pupils are equal, round, and reactive to light. No scleral icterus. Neck: Normal range of motion. Neck supple. No JVD present. No thyromegaly present. Cardiovascular: Normal rate, regular rhythm, normal heart sounds and intact distal pulses. No murmur heard. Pulmonary/Chest: Effort normal and breath sounds normal. No respiratory distress. She has no wheezes. She has no rales. Abdominal: Soft. Bowel sounds are normal. She exhibits no distension. obese   Musculoskeletal: She exhibits tenderness (lumbars/knees/hands w/o clear RA changes). She exhibits no edema. djd   Neurological: She is alert and oriented to person, place, and time. Coordination normal.   Skin: Skin is warm and dry. No rash noted. Psychiatric: Her behavior is normal.   Chronically depressed  Poor insight to her medical issues   Nursing note and vitals reviewed. ASSESSMENT and PLAN  Diagnoses and all orders for this visit:    1. Seasonal allergic rhinitis, unspecified trigger    2. Type 2 diabetes mellitus without complication, without long-term current use of insulin ([address]    3. Essential hypertension    4. MS (multiple sclerosis) ([facility]    5. Rheumatoid arthritis, involving unspecified site, unspecified rheumatoid factor presence ([address]    6. Hep C w/o coma, chronic (HCC)    7. ANDERSON (dyspnea on exertion)    Other orders  -     fluticasone (FLONASE) 50 mcg/actuation nasal spray; 2 Sprays by Both Nostrils route daily. -     albuterol (PROVENTIL HFA, VENTOLIN HFA, PROAIR HFA) 90 mcg/actuation inhaler;  Take 1 Puff by inhalation every six (6) hours as needed for Wheezing.  -     guaiFENesin ER (MUCINEX) 600 mg ER tablet; Take 1 Tab by mouth two (2) times a day. Follow-up Disposition:  Return in about 4 months (around 9/14/2018).    lab results and schedule of future lab studies reviewed with patient  reviewed diet, exercise and weight control  reviewed medications and side effects in detail  low cholesterol diet, weight control and daily exercise discussed, home glucose monitoring emphasized, all medications, side effects and compliance discussed carefully, foot care discussed and Podiatry visits discussed, annual eye examinations at Ophthalmology discussed, glycohemoglobin and other lab monitoring discussed and long term diabetic complications discussed  F/u with other MD's as scheduled  Monitor BS at home with goal of 100 to 150

## 2018-05-14 NOTE — PROGRESS NOTES
Health Maintenance Due   Topic Date Due    EYE EXAM RETINAL OR DILATED Q1  07/18/1962    COLONOSCOPY  07/18/1970    DTaP/Tdap/Td series (1 - Tdap) 07/18/1973    ZOSTER VACCINE AGE 60>  05/18/2012    MICROALBUMIN Q1  06/23/2016    GLAUCOMA SCREENING Q2Y  07/18/2017    Pneumococcal 65+ Low/Medium Risk (1 of 2 - PCV13) 07/18/2017       Chief Complaint   Patient presents with    Cold Symptoms     x 5 days    Cough     productive-yellowish    Headache       1. Have you been to the ER, urgent care clinic since your last visit? Hospitalized since your last visit? No    2. Have you seen or consulted any other health care providers outside of the [address] [place] [address] since your last visit? Include any pap smears or colon screening. No    3) Do you have an Advance Directive on file? no    4) Are you interested in receiving information on Advance Directives? NO      Patient is accompanied by self I have received verbal consent from Unknown [place] to discuss any/all medical information while they are present in the room.

## 2018-05-25 ENCOUNTER — OFFICE VISIT (OUTPATIENT)
Dept: URGENT CARE | Age: 68
End: 2018-05-25

## 2018-05-25 VITALS
DIASTOLIC BLOOD PRESSURE: 75 MMHG | RESPIRATION RATE: 18 BRPM | HEART RATE: 62 BPM | BODY MASS INDEX: 29.38 KG/M2 | OXYGEN SATURATION: 97 % | HEIGHT: 63 IN | WEIGHT: 165.8 LBS | TEMPERATURE: 97.9 F | SYSTOLIC BLOOD PRESSURE: 170 MMHG

## 2018-05-25 DIAGNOSIS — R05.9 COUGH: Primary | ICD-10-CM

## 2018-05-25 RX ORDER — BENZONATATE 200 MG/1
200 CAPSULE ORAL
Qty: 21 CAP | Refills: 0 | Status: SHIPPED | OUTPATIENT
Start: 2018-05-25 | End: 2018-06-01

## 2018-05-25 RX ORDER — PREDNISONE 20 MG/1
20 TABLET ORAL 2 TIMES DAILY
Qty: 10 TAB | Refills: 0 | Status: SHIPPED | OUTPATIENT
Start: 2018-05-25 | End: 2018-05-30

## 2018-05-25 NOTE — MR AVS SNAPSHOT
[address] [place] [address] 
[phone] Patient: Unknown [place] MRN: [id number] Visit Information Date & Time Provider Department Dept. Phone Encounter #  
 5/25/2018  5:15 PM [address] [facility] [phone] [id number] Upcoming Health Maintenance Date Due  
 EYE EXAM RETINAL OR DILATED Q1 7/18/1962 COLONOSCOPY 7/18/1970 DTaP/Tdap/Td series (1 - Tdap) 7/18/1973 ZOSTER VACCINE AGE 60> 5/18/2012 MICROALBUMIN Q1 6/23/2016 GLAUCOMA SCREENING Q2Y 7/18/2017 Pneumococcal 65+ Low/Medium Risk (1 of 2 - PCV13) 7/18/2017 HEMOGLOBIN A1C Q6M 6/29/2018 Influenza Age 5 to Adult 8/1/2018 BREAST CANCER SCRN MAMMOGRAM 10/17/2018 LIPID PANEL Q1 12/29/2018 FOOT EXAM Q1 4/30/2019 Allergies as of 5/25/2018  Review Complete On: 5/25/2018 By: Birdie Roque RN Severity Noted Reaction Type Reactions Pcn [Penicillins] High 02/02/2010   Intolerance Swelling Fosamax [Alendronate] Medium 02/02/2010   Intolerance Swelling Sulfa (Sulfonamide Antibiotics) Low 02/02/2010   Topical Itching Current Immunizations  Reviewed on 10/25/2016 Name Date Influenza Nasal Vaccine 9/25/2016 Pneumococcal Polysaccharide (PPSV-23) 1/4/2015 10:52 AM  
  
 Not reviewed this visit You Were Diagnosed With   
  
 Codes Comments Cough    -  Primary ICD-10-CM: R79 ICD-9-CM: 819. 2 Vitals BP Pulse Temp Resp Height(growth percentile) Weight(growth percentile) 170/75 62 97.9 °F (36.6 °C) 18 5' 3\" (1.6 m) 165 lb 12.8 oz (75.2 kg) SpO2 BMI OB Status Smoking Status 97% 29.37 kg/m2 Hysterectomy Never Smoker BMI and BSA Data Body Mass Index Body Surface Area  
 29.37 kg/m 2 1.83 m 2 Preferred Pharmacy Pharmacy Name Phone [facility] [phone] [phone] Your Updated Medication List  
  
   
This list is accurate as of 5/25/18  6:05 PM.  Always use your most recent med list.  
  
  
  
  
 albuterol 90 mcg/actuation inhaler Commonly known as:  PROVENTIL HFA, VENTOLIN HFA, PROAIR HFA Take 1 Puff by inhalation every six (6) hours as needed for Wheezing. amLODIPine 10 mg tablet Commonly known as:  Sherrye Acron TAKE 1 TABLET BY MOUTH EVERY DAY  
  
 ammonium lactate 12 % lotion Commonly known as:  LAC-HYDRIN Apply  to affected area two (2) times a day. rub in to affected area well   Indications: on legs  
  
 aspirin 81 mg chewable tablet Take 81 mg by mouth daily. benzonatate 200 mg capsule Commonly known as:  TESSALON Take 1 Cap by mouth three (3) times daily as needed for Cough for up to 7 days. calcium citrate-vitamin D3 tablet Commonly known as:  CITRACAL WITH VITAMIN D MAXIMUM  
1 Tab daily. citalopram 20 mg tablet Commonly known as:  CELEXA  
TAKE 1 TABLET BY MOUTH EVERY DAY  
  
 cloNIDine HCl 0.1 mg tablet Commonly known as:  CATAPRES  
TAKE 1 TABLET BY MOUTH TWICE A DAY  
  
 fluticasone 50 mcg/actuation nasal spray Commonly known as:  Edson Harmon 2 Sprays by Both Nostrils route daily. glipiZIDE 10 mg tablet Commonly known as:  GLUCOTROL  
TAKE 1 TAB BY MOUTH BEFORE BREAKFAST AND DINNER.  
  
 guaiFENesin  mg ER tablet Commonly known as:  Shai & Shai Take 1 Tab by mouth two (2) times a day. insulin glargine 100 unit/mL (3 mL) Inpn Commonly known as:  BASAGLAR KWIKPEN U-100 INSULIN  
10 UNITS AT BEDTIME  
  
 interferon beta-1a 30 mcg/0.5 mL Pnij Commonly known as:  AVONEX  
0.5 mL by IntraMUSCular route every seven (7) days  
  
 metoprolol succinate 200 mg XL tablet Commonly known as:  TOPROL-XL  
TAKE 1 TABLET BY MOUTH EVERY DAY NexIUM 24HR 20 mg Tbec Generic drug:  esomeprazole magnesium TAKE 1 TABLET BY MOUTH DAILY predniSONE 20 mg tablet Commonly known as:  Dejuan Henderson Take 1 Tab by mouth two (2) times a day for 5 days. risedronate 150 mg tablet Commonly known as:  ACTONEL Take 1 Tab by mouth every thirty (30) days. SITagliptin 100 mg tablet Commonly known as:  Agustin Anis TAKE 1 TABLET BY MOUTH EVERY DAY  
  
 VITAMIN C 500 mg tablet Generic drug:  ascorbic acid (vitamin C) Take 500 mg by mouth daily. zolpidem 5 mg tablet Commonly known as:  AMBIEN Take 1 Tab by mouth nightly as needed for Sleep. Prescriptions Sent to Pharmacy Refills  
 benzonatate (TESSALON) 200 mg capsule 0 Sig: Take 1 Cap by mouth three (3) times daily as needed for Cough for up to 7 days. Class: Normal  
 Pharmacy: [facility] [address] Ph #: [phone] Route: Oral  
 predniSONE (DELTASONE) 20 mg tablet 0 Sig: Take 1 Tab by mouth two (2) times a day for 5 days. Class: Normal  
 Pharmacy: [facility] [address] Ph #: [phone] Route: Oral  
  
To-Do List   
 05/25/2018 Imaging:  XR CHEST PA LAT Introducing [facility] [place] [facility]! [employer] introduces [employer] patient portal. Now you can access parts of your medical record, email your doctor's office, and request medication refills online. 1. In your internet browser, go to [email or web address] 2. Click on the First Time User? Click Here link in the Sign In box. You will see the New Member Sign Up page. 3. Enter your Codacy Access Code exactly as it appears below. You will not need to use this code after youve completed the sign-up process. If you do not sign up before the expiration date, you must request a new code. · [employer] Access Code: [email or web address] Expires: 7/29/2018 12:05 PM 
 
4.  Enter the last four digits of your Social Security Number (xxxx) and Date of Birth (mm/dd/yyyy) as indicated and click Submit. You will be taken to the next sign-up page. 5. Create a ZALORA ID. This will be your ZALORA login ID and cannot be changed, so think of one that is secure and easy to remember. 6. Create a ZALORA password. You can change your password at any time. 7. Enter your Password Reset Question and Answer. This can be used at a later time if you forget your password. 8. Enter your e-mail address. You will receive e-mail notification when new information is available in [address] 9. Click Sign Up. You can now view and download portions of your medical record. 10. Click the Download Summary menu link to download a portable copy of your medical information. If you have questions, please visit the Frequently Asked Questions section of the ZALORA website. Remember, ZALORA is NOT to be used for urgent needs. For medical emergencies, dial 911. Now available from your iPhone and Android! Please provide this summary of care documentation to your next provider. Your primary care clinician is listed as Moose Cunningham. If you have any questions after today's visit, please call [phone].

## 2018-05-28 NOTE — PROGRESS NOTES
HISTORY OF PRESENT ILLNESS  Amaya Slaughter is a 74 y.o. female. Pt. comes in for f/u. Has multiple medical problems. C/o R little trigger finger with pain. BP is high. BS has been high. Reports compliance with medications but not her diet. Med list and most recent labs/studies reviewed with pt. Not active physically to control weight. followed by neurology and rheumatology. Needs med refills. Due for repeat labs. Reports no other new c/o. Finger Pain   Associated symptoms include abdominal pain and headaches. Pertinent negatives include no chest pain and no shortness of breath. Abdominal Pain   Associated symptoms include abdominal pain and headaches. Pertinent negatives include no chest pain and no shortness of breath. Hypertension    Associated symptoms include headaches. Pertinent negatives include no chest pain, no blurred vision and no shortness of breath. Diabetes   Associated symptoms include abdominal pain and headaches. Pertinent negatives include no chest pain and no shortness of breath. Review of Systems   Constitutional: Negative. HENT: Negative. Eyes: Negative for blurred vision. Respiratory: Negative for shortness of breath. Cardiovascular: Negative for chest pain and leg swelling. Gastrointestinal: Positive for abdominal pain and heartburn. Genitourinary: Negative for dysuria. Musculoskeletal: Positive for joint pain. Negative for falls. Skin: Negative. Neurological: Positive for tingling, sensory change and headaches. Negative for focal weakness. Endo/Heme/Allergies: Negative for polydipsia. Psychiatric/Behavioral: Positive for depression. The patient is nervous/anxious and has insomnia. All other systems reviewed and are negative. Physical Exam   Constitutional: She is oriented to person, place, and time. She appears well-developed and well-nourished. No distress. overweight   HENT:   Head: Normocephalic and atraumatic.    Nose: Nose normal. Mouth/Throat: No oropharyngeal exudate. Eyes: Conjunctivae are normal. Pupils are equal, round, and reactive to light. Neck: Normal range of motion. Neck supple. No JVD present. No thyromegaly present. Cardiovascular: Normal rate, regular rhythm, normal heart sounds and intact distal pulses. No murmur heard. Pulmonary/Chest: Effort normal and breath sounds normal. No respiratory distress. She has no wheezes. She has no rales. Abdominal: Soft. Bowel sounds are normal. She exhibits no distension. obese   Musculoskeletal: She exhibits tenderness (lumbars/knees/hands w/o clear RA changes,,, ). She exhibits no edema. djd   Neurological: She is alert and oriented to person, place, and time. Coordination normal.   Skin: Skin is warm and dry. No rash noted. Psychiatric: Her behavior is normal.   Chronically depressed  Poor insight to her medical issues   Nursing note and vitals reviewed. ASSESSMENT and PLAN  Diagnoses and all orders for this visit:    1. Type 2 diabetes mellitus without complication, without long-term current use of insulin (HCC)  -     MICROALBUMIN, UR, RAND W/ MICROALB/CREAT RATIO  -     LIPID PANEL  -     METABOLIC PANEL, COMPREHENSIVE  -     HEMOGLOBIN A1C WITH EAG    2. Essential hypertension    3. MS (multiple sclerosis) ([facility] [address]    4. Rheumatoid arthritis, involving unspecified site, unspecified rheumatoid factor presence ([address]    5. Hep C w/o coma, chronic ([facility])    6. Recurrent major depressive disorder, in full remission ([facility] [address]    7.  Trigger little finger of right hand  -     INJECT TRIGGER POINT, 1 OR 2  -     METHYLPREDNISOLONE ACETATE INJECTION 20 MG  -     VA THER/PROPH/DIAG INJECTION, SUBCUT/IM    Other orders  -     citalopram (CELEXA) 20 mg tablet; TAKE 1 TABLET BY MOUTH EVERY DAY  -     SITagliptin (JANUVIA) 100 mg tablet; TAKE 1 TABLET BY MOUTH EVERY DAY  -     amLODIPine (NORVASC) 10 mg tablet; TAKE 1 TABLET BY MOUTH EVERY DAY  -     cloNIDine HCl (CATAPRES) 0.1 mg tablet; TAKE 1 TABLET BY MOUTH TWICE A DAY  -     metoprolol succinate (TOPROL-XL) 200 mg XL tablet; TAKE 1 TABLET BY MOUTH EVERY DAY  -     insulin glargine (BASAGLAR KWIKPEN U-100 INSULIN) 100 unit/mL (3 mL) inpn; 10 UNITS AT BEDTIME  -     risedronate (ACTONEL) 150 mg tablet; Take 1 Tab by mouth every thirty (30) days. -     methylPREDNISolone acetate (DEPO-MEDROL) 20 mg/mL susp; 0.5 mL by Intra artICUlar route once for 1 dose. Follow-up Disposition:  Return in about 4 months (around 8/30/2018).    lab results and schedule of future lab studies reviewed with patient  reviewed diet, exercise and weight control  reviewed medications and side effects in detail  low cholesterol diet, weight control and daily exercise discussed, home glucose monitoring emphasized, all medications, side effects and compliance discussed carefully, foot care discussed and Podiatry visits discussed, annual eye examinations at Ophthalmology discussed, glycohemoglobin and other lab monitoring discussed and long term diabetic complications discussed  F/u with other MD's as scheduled

## 2018-05-31 RX ORDER — GLIPIZIDE 10 MG/1
TABLET ORAL
Qty: 298 TAB | Refills: 0 | Status: SHIPPED | OUTPATIENT
Start: 2018-05-31 | End: 2018-10-22 | Stop reason: SDUPTHER

## 2018-06-12 RX ORDER — MICONAZOLE NITRATE 2 %
1 CREAM WITH APPLICATOR VAGINAL DAILY
Qty: 30 TAB | Refills: 5 | Status: SHIPPED | OUTPATIENT
Start: 2018-06-12 | End: 2018-10-15 | Stop reason: SDUPTHER

## 2018-07-24 DIAGNOSIS — E11.9 TYPE 2 DIABETES MELLITUS WITHOUT COMPLICATION, WITHOUT LONG-TERM CURRENT USE OF INSULIN (HCC): Primary | ICD-10-CM

## 2018-07-24 RX ORDER — INSULIN GLARGINE 100 [IU]/ML
INJECTION, SOLUTION SUBCUTANEOUS
Qty: 15 PEN | Refills: 5 | Status: SHIPPED | OUTPATIENT
Start: 2018-07-24 | End: 2018-10-25 | Stop reason: SDUPTHER

## 2018-07-24 NOTE — PROGRESS NOTES
Requested Prescriptions     Signed Prescriptions Disp Refills    insulin glargine (LANTUS SOLOSTAR U-100 INSULIN) 100 unit/mL (3 mL) inpn 15 Pen 5     Sig: 10 units at bedtime    Stefanie Seaman FNP, verbal order received.    Pt made aware

## 2018-08-01 DIAGNOSIS — M81.6 LOCALIZED OSTEOPOROSIS, UNSPECIFIED PATHOLOGICAL FRACTURE PRESENCE: Primary | ICD-10-CM

## 2018-08-03 ENCOUNTER — DOCUMENTATION ONLY (OUTPATIENT)
Dept: INTERNAL MEDICINE CLINIC | Age: 68
End: 2018-08-03

## 2018-08-14 ENCOUNTER — OFFICE VISIT (OUTPATIENT)
Dept: INTERNAL MEDICINE CLINIC | Age: 68
End: 2018-08-14

## 2018-08-14 VITALS
WEIGHT: 163 LBS | HEIGHT: 63 IN | DIASTOLIC BLOOD PRESSURE: 80 MMHG | HEART RATE: 57 BPM | BODY MASS INDEX: 28.88 KG/M2 | SYSTOLIC BLOOD PRESSURE: 133 MMHG | RESPIRATION RATE: 18 BRPM | OXYGEN SATURATION: 98 % | TEMPERATURE: 96.7 F

## 2018-08-14 DIAGNOSIS — E11.9 TYPE 2 DIABETES MELLITUS WITHOUT COMPLICATION, WITHOUT LONG-TERM CURRENT USE OF INSULIN (HCC): Primary | ICD-10-CM

## 2018-08-14 DIAGNOSIS — Z12.31 ENCOUNTER FOR SCREENING MAMMOGRAM FOR BREAST CANCER: ICD-10-CM

## 2018-08-14 DIAGNOSIS — B18.2 HEP C W/O COMA, CHRONIC (HCC): ICD-10-CM

## 2018-08-14 DIAGNOSIS — Z23 ENCOUNTER FOR IMMUNIZATION: ICD-10-CM

## 2018-08-14 DIAGNOSIS — M06.9 RHEUMATOID ARTHRITIS, INVOLVING UNSPECIFIED SITE, UNSPECIFIED RHEUMATOID FACTOR PRESENCE: ICD-10-CM

## 2018-08-14 DIAGNOSIS — K21.9 GASTROESOPHAGEAL REFLUX DISEASE WITHOUT ESOPHAGITIS: ICD-10-CM

## 2018-08-14 DIAGNOSIS — I10 ESSENTIAL HYPERTENSION: ICD-10-CM

## 2018-08-14 DIAGNOSIS — G35 MS (MULTIPLE SCLEROSIS) (HCC): ICD-10-CM

## 2018-08-14 NOTE — PROGRESS NOTES
HISTORY OF PRESENT ILLNESS  Cindi Hogan is a 79 y.o. female. Pt. comes in for f/u. Has multiple medical problems. Her BP is stable. Sugars run in mid 100s. Has not done labs as recommended a few months ago. Followed by rheumatologist for RA. Followed by neurologist for MS. Has been seeing GI for stomach issues. Reports occasional spitting blood. Scheduled to have an EGD. Has been treated for hep C in the past.  Reports compliance with medications and diet. Med list and most recent labs/studies reviewed with pt. Trying to be active physically to control weight. Needs med refills. Due for repeat labs. Denies tobacco or alcohol use. Reports no other new c/o. Diabetes   Associated symptoms include abdominal pain. Pertinent negatives include no chest pain and no shortness of breath. Hypertension    Pertinent negatives include no chest pain, no blurred vision and no shortness of breath. Epigastric Pain    Pertinent negatives include no dysuria and no chest pain. Joint Pain    Associated symptoms include tingling. Review of Systems   Constitutional: Negative. HENT: Negative. Eyes: Negative for blurred vision. Respiratory: Negative for shortness of breath. Cardiovascular: Negative for chest pain and leg swelling. Gastrointestinal: Positive for abdominal pain and heartburn. Genitourinary: Negative for dysuria. Musculoskeletal: Positive for joint pain. Negative for falls. Skin: Negative. Neurological: Positive for tingling and sensory change. Negative for focal weakness. Endo/Heme/Allergies: Negative for polydipsia. Psychiatric/Behavioral: Positive for depression. The patient is nervous/anxious and has insomnia. All other systems reviewed and are negative. Physical Exam   Constitutional: She is oriented to person, place, and time. She appears well-developed and well-nourished. No distress. overweight   HENT:   Head: Normocephalic and atraumatic.    Mouth/Throat: Oropharynx is clear and moist.   Eyes: Conjunctivae are normal. Pupils are equal, round, and reactive to light. Neck: Normal range of motion. Neck supple. No JVD present. No thyromegaly present. Cardiovascular: Normal rate, regular rhythm, normal heart sounds and intact distal pulses. No murmur heard. Pulmonary/Chest: Effort normal and breath sounds normal. No respiratory distress. She has no wheezes. She has no rales. Abdominal: Soft. Bowel sounds are normal. She exhibits no distension. There is no tenderness. obese   Musculoskeletal: She exhibits tenderness (lumbars/knees/hands w/o clear RA changes,,, ). She exhibits no edema. djd   Neurological: She is alert and oriented to person, place, and time. Coordination normal.   Skin: Skin is warm and dry. No rash noted. Psychiatric: Her behavior is normal.   Chronically depressed     Nursing note and vitals reviewed. ASSESSMENT and PLAN  Diagnoses and all orders for this visit:    1. Type 2 diabetes mellitus without complication, without long-term current use of insulin (HCC)  -     LIPID PANEL  -     METABOLIC PANEL, COMPREHENSIVE  -     CBC W/O DIFF  -     HEMOGLOBIN A1C WITH EAG  -     MICROALBUMIN, UR, RAND W/ MICROALB/CREAT RATIO    2. Essential hypertension    3. Rheumatoid arthritis, involving unspecified site, unspecified rheumatoid factor presence ([facility] [address]    4. MS (multiple sclerosis) ([facility] [address]    5. Hep C w/o coma, chronic (HCC)    6. Gastroesophageal reflux disease without esophagitis    7. Encounter for screening mammogram for breast cancer  -     [facility] MAMMO BI SCREENING INCL CAD; Future    8. Encounter for immunization  -     PNEUMOCOCCAL CONJ VACCINE 13 VALENT IM    Other orders  -     Insulin Needles, Disposable, 30 gauge x 1/3\"; 10 Units by SubCUTAneous route nightly. -     varicella-zoster recombinant, PF, (SHINGRIX) 50 mcg/0.5 mL susr injection; 0.5 mL by IntraMUSCular route once for 1 dose.       Follow-up Disposition:  Return in about 6 months (around 2/14/2019).    lab results and schedule of future lab studies reviewed with patient  reviewed diet, exercise and weight control  reviewed medications and side effects in detail  F/u with other MD's as scheduled  Overall stable

## 2018-08-14 NOTE — PROGRESS NOTES
Patient identified with 2 ID's, Name and Mauricio Dempsey is a 79 y.o. female  Chief Complaint   Patient presents with    Medication Refill     complains of 7/10 left hip and knee pain, fingers       1. Have you been to the ER, urgent care clinic since your last visit? Hospitalized since your last visit? No     2. Have you seen or consulted any other health care providers outside of the [address] [place] [address] since your last visit? Include any pap smears or colon screening. No     In the event something were to happen to you and you were unable to speak on your behalf, do you have an Advance Directive/ Living Will in place stating your wishes? No     If no, would you like information?     declined     Visit Vitals    /80 (BP 1 Location: Left arm, BP Patient Position: Sitting)    Pulse (!) 57    Temp 96.7 °F (35.9 °C) (Oral)    Resp 18    Ht 5' 3\" (1.6 m)    Wt 163 lb (73.9 kg)    SpO2 98%    BMI 28.87 kg/m2         Medication Reconciliation reviewed with patient on this date      Fall Risk Assessment, last 12 mths 5/14/2018   Able to walk? Yes   Fall in past 12 months? No       PHQ over the last two weeks 5/14/2018   Little interest or pleasure in doing things Not at all   Feeling down, depressed, irritable, or hopeless Not at all   Total Score PHQ 2 0       Learning Assessment 1/13/2017   PRIMARY LEARNER Patient   HIGHEST LEVEL OF EDUCATION - PRIMARY LEARNER  GRADUATED HIGH SCHOOL OR GED   BARRIERS PRIMARY LEARNER OTHER   CO-LEARNER CAREGIVER No   PRIMARY LANGUAGE ENGLISH   LEARNER PREFERENCE PRIMARY LISTENING   ANSWERED BY Patient   RELATIONSHIP SELF       Abuse Screening Questionnaire 5/14/2018   Do you ever feel afraid of your partner? N   Are you in a relationship with someone who physically or mentally threatens you? N   Is it safe for you to go home?  Y       After obtaining consent, and per orders of Dr. Silvino Snow, injection of [id number] administered to right deltoid by Arnold Marshall Noe Perez RN. Patient instructed to remain in clinic for 20 minutes afterwards, and to report any adverse reaction to me immediately. VIS Pneumococcal 13 information sheet provided to patient. No adverse reactions noted.

## 2018-08-15 LAB
ALBUMIN SERPL-MCNC: 4.6 G/DL (ref 3.6–4.8)
ALBUMIN/CREAT UR: 293.2 MG/G CREAT (ref 0–30)
ALBUMIN/GLOB SERPL: 1.6 {RATIO} (ref 1.2–2.2)
ALP SERPL-CCNC: 112 IU/L (ref 39–117)
ALT SERPL-CCNC: 22 IU/L (ref 0–32)
AST SERPL-CCNC: 25 IU/L (ref 0–40)
BILIRUB SERPL-MCNC: 0.2 MG/DL (ref 0–1.2)
BUN SERPL-MCNC: 12 MG/DL (ref 8–27)
BUN/CREAT SERPL: 14 (ref 12–28)
CALCIUM SERPL-MCNC: 9.4 MG/DL (ref 8.7–10.3)
CHLORIDE SERPL-SCNC: 99 MMOL/L (ref 96–106)
CHOLEST SERPL-MCNC: 227 MG/DL (ref 100–199)
CO2 SERPL-SCNC: 25 MMOL/L (ref 20–29)
CREAT SERPL-MCNC: 0.85 MG/DL (ref 0.57–1)
CREAT UR-MCNC: 272 MG/DL
ERYTHROCYTE [DISTWIDTH] IN BLOOD BY AUTOMATED COUNT: 14.6 % (ref 12.3–15.4)
EST. AVERAGE GLUCOSE BLD GHB EST-MCNC: 203 MG/DL
GLOBULIN SER CALC-MCNC: 2.9 G/DL (ref 1.5–4.5)
GLUCOSE SERPL-MCNC: 211 MG/DL (ref 65–99)
HBA1C MFR BLD: 8.7 % (ref 4.8–5.6)
HCT VFR BLD AUTO: 41.1 % (ref 34–46.6)
HDLC SERPL-MCNC: 46 MG/DL
HGB BLD-MCNC: 13.1 G/DL (ref 11.1–15.9)
INTERPRETATION, 910389: NORMAL
LDLC SERPL CALC-MCNC: 149 MG/DL (ref 0–99)
Lab: NORMAL
MCH RBC QN AUTO: 25.9 PG (ref 26.6–33)
MCHC RBC AUTO-ENTMCNC: 31.9 G/DL (ref 31.5–35.7)
MCV RBC AUTO: 81 FL (ref 79–97)
MICROALBUMIN UR-MCNC: 797.6 UG/ML
PLATELET # BLD AUTO: 262 X10E3/UL (ref 150–379)
POTASSIUM SERPL-SCNC: 4.6 MMOL/L (ref 3.5–5.2)
PROT SERPL-MCNC: 7.5 G/DL (ref 6–8.5)
RBC # BLD AUTO: 5.06 X10E6/UL (ref 3.77–5.28)
SODIUM SERPL-SCNC: 140 MMOL/L (ref 134–144)
TRIGL SERPL-MCNC: 159 MG/DL (ref 0–149)
VLDLC SERPL CALC-MCNC: 32 MG/DL (ref 5–40)
WBC # BLD AUTO: 5.5 X10E3/UL (ref 3.4–10.8)

## 2018-08-17 RX ORDER — ATORVASTATIN CALCIUM 20 MG/1
20 TABLET, FILM COATED ORAL
Qty: 90 TAB | Refills: 1 | Status: SHIPPED | OUTPATIENT
Start: 2018-08-17 | End: 2018-10-22 | Stop reason: SDUPTHER

## 2018-08-17 NOTE — PROGRESS NOTES
Her diabetes is out-of-control  Her cholesterol is high    Increase Lantus from 10 to 20 units   Start Lipitor 20 mg 1 at bedtime (I sent prescription to pharmacy)  Watch diet closely and exercise  Monitor sugars at home with goal of 100-150    RTC 2 months

## 2018-08-22 NOTE — PROGRESS NOTES
Spoke with patient after verifying name and . Notified patient of lab results and recommendation from provider.  .  Her diabetes is out-of-control   Her cholesterol is high   Increase Lantus from 10 to 20 units   Start Lipitor 20 mg 1 at bedtime   Watch diet closely and exercise   Monitor sugars at home with goal of 100-150   RTC 2 months  Patient verbalized understanding and given a chance to ask questions  Letter mailed to patient with results and recommendations from provider

## 2018-08-27 RX ORDER — PRENATAL VIT CALC,IRON,FOLIC
TABLET ORAL
Qty: 960 TAB | Refills: 0 | Status: SHIPPED | OUTPATIENT
Start: 2018-08-27 | End: 2018-10-16 | Stop reason: SDUPTHER

## 2018-08-27 RX ORDER — CLONIDINE HYDROCHLORIDE 0.1 MG/1
TABLET ORAL
Qty: 60 TAB | Refills: 5 | Status: SHIPPED | OUTPATIENT
Start: 2018-08-27 | End: 2018-08-29 | Stop reason: SDUPTHER

## 2018-08-27 RX ORDER — ALBUTEROL SULFATE 108 UG/1
AEROSOL, METERED RESPIRATORY (INHALATION)
Qty: 1 INHALER | Refills: 5 | Status: SHIPPED | OUTPATIENT
Start: 2018-08-27 | End: 2019-04-04

## 2018-08-27 RX ORDER — METOPROLOL SUCCINATE 200 MG/1
TABLET, EXTENDED RELEASE ORAL
Qty: 30 TAB | Refills: 5 | Status: SHIPPED | OUTPATIENT
Start: 2018-08-27 | End: 2018-10-22 | Stop reason: SDUPTHER

## 2018-08-28 ENCOUNTER — DOCUMENTATION ONLY (OUTPATIENT)
Dept: INTERNAL MEDICINE CLINIC | Age: 68
End: 2018-08-28

## 2018-08-29 RX ORDER — RISEDRONATE SODIUM 150 MG/1
150 TABLET, FILM COATED ORAL
Qty: 3 TAB | Refills: 3 | Status: SHIPPED | OUTPATIENT
Start: 2018-08-29 | End: 2019-12-11 | Stop reason: SDUPTHER

## 2018-08-29 RX ORDER — CLONIDINE HYDROCHLORIDE 0.1 MG/1
TABLET ORAL
Qty: 60 TAB | Refills: 5 | Status: SHIPPED | OUTPATIENT
Start: 2018-08-29 | End: 2018-10-22 | Stop reason: SDUPTHER

## 2018-10-15 RX ORDER — HYDROGEN PEROXIDE 3 %
20 SOLUTION, NON-ORAL MISCELLANEOUS DAILY
Qty: 30 CAP | Refills: 4 | Status: SHIPPED | OUTPATIENT
Start: 2018-10-15 | End: 2018-11-08 | Stop reason: DRUGHIGH

## 2018-10-15 RX ORDER — ESOMEPRAZOLE MAGNESIUM 20 MG/1
TABLET, DELAYED RELEASE ORAL
Qty: 90 TAB | Refills: 0 | Status: CANCELLED | OUTPATIENT
Start: 2018-10-15

## 2018-10-16 RX ORDER — MICONAZOLE NITRATE 2 %
1 CREAM WITH APPLICATOR VAGINAL DAILY
Qty: 30 TAB | Refills: 5 | Status: SHIPPED | OUTPATIENT
Start: 2018-10-16 | End: 2019-01-28 | Stop reason: SDUPTHER

## 2018-10-23 RX ORDER — AMLODIPINE BESYLATE 10 MG/1
TABLET ORAL
Qty: 90 TAB | Refills: 1 | Status: SHIPPED | OUTPATIENT
Start: 2018-10-23 | End: 2018-11-29 | Stop reason: SDUPTHER

## 2018-10-23 RX ORDER — GLIPIZIDE 10 MG/1
10 TABLET ORAL 2 TIMES DAILY
Qty: 180 TAB | Refills: 1 | Status: SHIPPED | OUTPATIENT
Start: 2018-10-23 | End: 2019-03-16 | Stop reason: SDUPTHER

## 2018-10-23 RX ORDER — METOPROLOL SUCCINATE 200 MG/1
TABLET, EXTENDED RELEASE ORAL
Qty: 90 TAB | Refills: 1 | Status: SHIPPED | OUTPATIENT
Start: 2018-10-23 | End: 2019-02-04 | Stop reason: SDUPTHER

## 2018-10-23 RX ORDER — CLONIDINE HYDROCHLORIDE 0.1 MG/1
TABLET ORAL
Qty: 180 TAB | Refills: 1 | Status: SHIPPED | OUTPATIENT
Start: 2018-10-23 | End: 2019-03-16 | Stop reason: SDUPTHER

## 2018-10-23 RX ORDER — CITALOPRAM 20 MG/1
20 TABLET, FILM COATED ORAL DAILY
Qty: 90 TAB | Refills: 1 | Status: SHIPPED | OUTPATIENT
Start: 2018-10-23 | End: 2019-02-04 | Stop reason: SDUPTHER

## 2018-10-23 RX ORDER — ATORVASTATIN CALCIUM 20 MG/1
20 TABLET, FILM COATED ORAL
Qty: 90 TAB | Refills: 1 | Status: SHIPPED | OUTPATIENT
Start: 2018-10-23 | End: 2019-02-04 | Stop reason: SDUPTHER

## 2018-10-25 DIAGNOSIS — E11.9 TYPE 2 DIABETES MELLITUS WITHOUT COMPLICATION, WITHOUT LONG-TERM CURRENT USE OF INSULIN (HCC): ICD-10-CM

## 2018-10-25 RX ORDER — INSULIN GLARGINE 100 [IU]/ML
INJECTION, SOLUTION SUBCUTANEOUS
Qty: 15 PEN | Refills: 5 | Status: SHIPPED | OUTPATIENT
Start: 2018-10-25 | End: 2019-04-04

## 2018-11-01 ENCOUNTER — OFFICE VISIT (OUTPATIENT)
Dept: NEUROLOGY | Age: 68
End: 2018-11-01

## 2018-11-01 VITALS
BODY MASS INDEX: 29.58 KG/M2 | OXYGEN SATURATION: 98 % | WEIGHT: 167 LBS | HEART RATE: 58 BPM | SYSTOLIC BLOOD PRESSURE: 134 MMHG | DIASTOLIC BLOOD PRESSURE: 82 MMHG | RESPIRATION RATE: 18 BRPM

## 2018-11-01 DIAGNOSIS — G35 MS (MULTIPLE SCLEROSIS) (HCC): Primary | ICD-10-CM

## 2018-11-01 DIAGNOSIS — B18.2 HEP C W/O COMA, CHRONIC (HCC): ICD-10-CM

## 2018-11-01 DIAGNOSIS — E11.9 TYPE 2 DIABETES MELLITUS WITHOUT COMPLICATION, WITHOUT LONG-TERM CURRENT USE OF INSULIN (HCC): ICD-10-CM

## 2018-11-01 RX ORDER — NORTRIPTYLINE HYDROCHLORIDE 25 MG/1
25 CAPSULE ORAL
Qty: 90 CAP | Refills: 0 | Status: SHIPPED | OUTPATIENT
Start: 2018-11-01 | End: 2019-04-04

## 2018-11-01 NOTE — PATIENT INSTRUCTIONS
A Healthy Lifestyle: Care Instructions  Your Care Instructions    A healthy lifestyle can help you feel good, stay at a healthy weight, and have plenty of energy for both work and play. A healthy lifestyle is something you can share with your whole family. A healthy lifestyle also can lower your risk for serious health problems, such as high blood pressure, heart disease, and diabetes. You can follow a few steps listed below to improve your health and the health of your family. Follow-up care is a key part of your treatment and safety. Be sure to make and go to all appointments, and call your doctor if you are having problems. It's also a good idea to know your test results and keep a list of the medicines you take. How can you care for yourself at home? · Do not eat too much sugar, fat, or fast foods. You can still have dessert and treats now and then. The goal is moderation. · Start small to improve your eating habits. Pay attention to portion sizes, drink less juice and soda pop, and eat more fruits and vegetables. ? Eat a healthy amount of food. A 3-ounce serving of meat, for example, is about the size of a deck of cards. Fill the rest of your plate with vegetables and whole grains. ? Limit the amount of soda and sports drinks you have every day. Drink more water when you are thirsty. ? Eat at least 5 servings of fruits and vegetables every day. It may seem like a lot, but it is not hard to reach this goal. A serving or helping is 1 piece of fruit, 1 cup of vegetables, or 2 cups of leafy, raw vegetables. Have an apple or some carrot sticks as an afternoon snack instead of a candy bar. Try to have fruits and/or vegetables at every meal.  · Make exercise part of your daily routine. You may want to start with simple activities, such as walking, bicycling, or slow swimming. Try to be active 30 to 60 minutes every day. You do not need to do all 30 to 60 minutes all at once.  For example, you can exercise 3 times a day for 10 or 20 minutes. Moderate exercise is safe for most people, but it is always a good idea to talk to your doctor before starting an exercise program.  · Keep moving. Luis Sia the lawn, work in the garden, or [employer]. Take the stairs instead of the elevator at work. · If you smoke, quit. People who smoke have an increased risk for heart attack, stroke, cancer, and other lung illnesses. Quitting is hard, but there are ways to boost your chance of quitting tobacco for good. ? Use nicotine gum, patches, or lozenges. ? Ask your doctor about stop-smoking programs and medicines. ? Keep trying. In addition to reducing your risk of diseases in the future, you will notice some benefits soon after you stop using tobacco. If you have shortness of breath or asthma symptoms, they will likely get better within a few weeks after you quit. · Limit how much alcohol you drink. Moderate amounts of alcohol (up to 2 drinks a day for men, 1 drink a day for women) are okay. But drinking too much can lead to liver problems, high blood pressure, and other health problems. Family health  If you have a family, there are many things you can do together to improve your health. · Eat meals together as a family as often as possible. · Eat healthy foods. This includes fruits, vegetables, lean meats and dairy, and whole grains. · Include your family in your fitness plan. Most people think of activities such as jogging or tennis as the way to fitness, but there are many ways you and your family can be more active. Anything that makes you breathe hard and gets your heart pumping is exercise. Here are some tips:  ? Walk to do errands or to take your child to school or the bus.  ? Go for a family bike ride after dinner instead of watching TV. Where can you learn more? Go to http://addis-johnathon.info/. Enter R549 in the search box to learn more about \"A Healthy Lifestyle: Care Instructions. \"  Current as of: December 7, 2017  Content Version: 11.8  © 0513-9821 [employer]. Care instructions adapted under license by [employer] (which disclaims liability or warranty for this information). If you have questions about a medical condition or this instruction, always ask your healthcare professional. [address] any warranty or liability for your use of this information.

## 2018-11-01 NOTE — PROGRESS NOTES
Neurology Clinic Follow up Note    Patient ID:  Siria Quiñonez  [id number]  10 y.o.  1950      Ms. Shanel Clayton is here for follow up today of MS       Last Appointment With Me:  10/25/17      Interval History:   Siria Quiñonez is a 79 y.o. right handed female with a PMH including DM, HTN, HCV, remote stroke presenting for f/u of MS. Date of onset: 1997-experienced knee buckling with falls, paresthesias L side later progressing to both feet, +fatigue, +optic neuritis b/l  Dx made via MRI and LP. Date of diagnosis: 1999    Disease course from Onset: RRMS    Disease course last year: RRMS, stable    Last imaging: MRI Brain [facility] FOR COGNITIVE DISORDERS 10/2017  Extensive intra-axial signal alterations are stable in appearance in  keeping with patient's diagnosis of multiple sclerosis. No enhancing lesion is  demonstrated. Medications for MS Used in the Past:   Avonex (current)- doesn't like injections due to injection site tenderness  Tecfidera- could not swallow pills, +nausea, flushing    Interval history:  She reports ongoing occasional sharp pains R hemisphere which are brief (few seconds) occurring 1-2x weekly. She took Nortriptyline on one occasion after last visit to assist with neuropathic pain/HA but did not continue due to concerns regarding side effects/weight gain. Compliant with Avonex- she doesn't like taking injections due to injection site soreness. Denies new focal weakness, numbness, vision loss. Reporting some frequent nocturia limiting sleep quality. She is using melatonin to assist with sleep. Endorses some occasional muscle cramping into the distal toes. Sleep: impaired due to nocturia    Fatigue: severe fatigue    Memory/Concentration:  Mild forgetfulness    Bladder: frequent urination, urinary urgency    Bowel: constipated    Depression: +Anxiety, mild depression on medication. Pain: head pain as described above        PMHx/ PSHx/ FHx/ SHx:  Reviewed and unchanged previous visit. Past Medical History:   Diagnosis Date    Arthritis     OSTEO A & OSTEOPOROSIS     Autoimmune disease ([facility] [address] 1999    MS    Depression     Diabetes ([facility] [address]     GERD (gastroesophageal reflux disease)     Hepatitis C     Hypertension     LBP (low back pain)     Lipoma 9/3/2013    Liver disease     CHRONIC HEP C    MS (multiple sclerosis) (HCC)     Other ill-defined conditions(799.89)     MS    Other ill-defined conditions(799.89)     HX DRUG ABUSE. CLEAN FOR 21 YRS.  Psychiatric disorder     DEPRESSION.  Stroke [facility])     Diagnosed 10/2011    Urinary incontinence          ROS:  Comprehensive review of systems negative except for as noted above. Objective:       Meds:  Current Outpatient Medications   Medication Sig Dispense Refill    insulin glargine (LANTUS SOLOSTAR U-100 INSULIN) 100 unit/mL (3 mL) inpn 10 units at bedtime 15 Pen 5    cloNIDine HCl (CATAPRES) 0.1 mg tablet TAKE 1 TABLET BY MOUTH TWICE A  Tab 1    amLODIPine (NORVASC) 10 mg tablet TAKE 1 TABLET BY MOUTH EVERY DAY 90 Tab 1    metoprolol succinate (TOPROL-XL) 200 mg XL tablet TAKE 1 TABLET BY MOUTH EVERY DAY 90 Tab 1    atorvastatin (LIPITOR) 20 mg tablet Take 1 Tab by mouth nightly. 90 Tab 1    citalopram (CELEXA) 20 mg tablet Take 1 Tab by mouth daily. 90 Tab 1    glipiZIDE (GLUCOTROL) 10 mg tablet Take 1 Tab by mouth two (2) times a day. 180 Tab 1    SITagliptin (JANUVIA) 100 mg tablet TAKE 1 TABLET BY MOUTH EVERY DAY 90 Tab 1    calcium citrate-vitamin D3 (CITRACAL WITH VITAMIN D MAXIMUM) tablet Take 1 Tab by mouth daily. 30 Tab 5    esomeprazole (NEXIUM) 20 mg capsule Take 1 Cap by mouth daily. 30 Cap 4    risedronate (ACTONEL) 150 mg tablet Take 1 Tab by mouth every thirty (30) days. 3 Tab 3    PROVENTIL HFA 90 mcg/actuation inhaler INHALE 1 PUFF BY MOUTH EVERY 6 HOURS AS NEEDED FOR WHEEZING 1 Inhaler 5    Insulin Needles, Disposable, 30 gauge x 1/3\" 10 Units by SubCUTAneous route nightly.  1 Package 11  fluticasone (FLONASE) 50 mcg/actuation nasal spray 2 Sprays by Both Nostrils route daily. 1 Bottle 1    interferon beta-1a (AVONEX) 30 mcg/0.5 mL pnij 0.5 mL by IntraMUSCular route every seven (7) days 4 Pen 11    NEXIUM 24HR 20 mg TbEC TAKE 1 TABLET BY MOUTH DAILY 90 Tab 0    aspirin 81 mg chewable tablet Take 81 mg by mouth daily.  ascorbic acid, vitamin C, (VITAMIN C) 500 mg tablet Take 500 mg by mouth daily.  zolpidem (AMBIEN) 5 mg tablet Take 1 Tab by mouth nightly as needed for Sleep. 30 Tab 0       Exam:  Visit Vitals  /82   Pulse (!) 58   Resp 18   Wt 75.8 kg (167 lb)   SpO2 98%   BMI 29.58 kg/m²     NEUROLOGICAL EXAM:  General: Awake, alert, speech fluent  CN: PERRL, EOMI without nystagmus, VFF to confrontation, facial sensation and strength are normal and symmetric, hearing is intact to finger rub bilaterally, palate and tongue movements are intact and symmetric. Motor: Normal tone, bulk and strength bilaterally. Reflexes: 1/4 and symmetric except for 3+ b/l patella, plantar stimulation is flexor. Coordination: FNF, ALONDRA, HTS intact. Sensation: LT intact throughout except for distal RLE  Gait: Normal-based and steady.       LABS  Results for orders placed or performed in visit on 08/14/18   LIPID PANEL   Result Value Ref Range    Cholesterol, total 227 (H) 100 - 199 mg/dL    Triglyceride 159 (H) 0 - 149 mg/dL    HDL Cholesterol 46 >39 mg/dL    VLDL, calculated 32 5 - 40 mg/dL    LDL, calculated 149 (H) 0 - 99 mg/dL   METABOLIC PANEL, COMPREHENSIVE   Result Value Ref Range    Glucose 211 (H) 65 - 99 mg/dL    BUN 12 8 - 27 mg/dL    Creatinine 0.85 0.57 - 1.00 mg/dL    GFR est non-AA 72 >59 mL/min/1.73    GFR est AA 83 >59 mL/min/1.73    BUN/Creatinine ratio 14 12 - 28    Sodium 140 134 - 144 mmol/L    Potassium 4.6 3.5 - 5.2 mmol/L    Chloride 99 96 - 106 mmol/L    CO2 25 20 - 29 mmol/L    Calcium 9.4 8.7 - 10.3 mg/dL    Protein, total 7.5 6.0 - 8.5 g/dL    Albumin 4.6 3.6 - 4.8 g/dL GLOBULIN, TOTAL 2.9 1.5 - 4.5 g/dL    A-G Ratio 1.6 1.2 - 2.2    Bilirubin, total 0.2 0.0 - 1.2 mg/dL    Alk. phosphatase 112 39 - 117 IU/L    AST (SGOT) 25 0 - 40 IU/L    ALT (SGPT) 22 0 - 32 IU/L   CBC W/O DIFF   Result Value Ref Range    WBC 5.5 3.4 - 10.8 x10E3/uL    RBC 5.06 3.77 - 5.28 x10E6/uL    HGB 13.1 11.1 - 15.9 g/dL    HCT 41.1 34.0 - 46.6 %    MCV 81 79 - 97 fL    MCH 25.9 (L) 26.6 - 33.0 pg    MCHC 31.9 31.5 - 35.7 g/dL    RDW 14.6 12.3 - 15.4 %    PLATELET 643 315 - 810 x10E3/uL   HEMOGLOBIN A1C WITH EAG   Result Value Ref Range    Hemoglobin A1c 8.7 (H) 4.8 - 5.6 %    Estimated average glucose 203 mg/dL   MICROALBUMIN, UR, RAND W/ MICROALB/CREAT RATIO   Result Value Ref Range    Creatinine, urine 272.0 Not Estab. mg/dL    Microalbumin, urine 797.6 Not Estab. ug/mL    Microalb/Creat ratio (ug/mg creat.) 293.2 (H) 0.0 - 30.0 mg/g creat   CVD REPORT   Result Value Ref Range    INTERPRETATION Note    DIABETES PATIENT EDUCATION   Result Value Ref Range    PDF Image Not applicable        IMAGING:  MRI Results (most recent):  Results from Hospital Encounter encounter on 10/18/17   MRI BRAIN W WO CONT    Narrative INDICATION: MS multiple sclerosis G 35. COMPARISON: MRI 9/18/2014    EXAM: Sagittal T1-weighted spin-echo, axial and sagittal FLAIR and T2-weighted  fast spin-echo, axial diffusion weighted echo planar, axial T1-weighted  spin-echo, axial gradient echo, and post IV contrast-enhanced axial and coronal  T1-weighted spin-echo MR images of the brain are obtained. A total of 15 cc  intravenous ProHance was administered for the study. The study was performed on  an open configuration 0.7 Jolene (intermediate) field strength MR imaging system. FINDINGS: Abundant foci of white matter signal alteration appear similar in  distribution and extent as that shown previously. A few tiny foci of hemosiderin  deposition are again noted in the thalami bilaterally and the right temporal  lobe.  There remains no evidence for intra-axial enhancement abnormality; there  are 2 areas of artifactual enhancement in the inferior left medulla and superior  right medulla which related to flow artifact.) the ventricles and cortical sulci  remain normal in size and contour. The vascular flow voids at the base the brain  are normal in conspicuity. Sella, optic chiasm, orbits and paranasal sinuses  remain normal in appearance. Impression IMPRESSION: Extensive intra-axial signal alterations are stable in appearance in  keeping with patient's diagnosis of multiple sclerosis. No enhancing lesion is  demonstrated. Assessment:     Encounter Diagnoses     ICD-10-CM ICD-9-CM   1. MS (multiple sclerosis) ([facility] [address] G35 340   2. Type 2 diabetes mellitus without complication, without long-term current use of insulin (HCC) E11.9 250.00   3. Hep C w/o coma, chronic (HCC) B18.2 070.54      Pleasant 79year old RHAAF h/o DM, HTN, HCV, depression and remote stroke here for f/u of MS dx 1999 (prior optic neuritis b/l, L paresthesias) on Avonex. Clinically, she appears stable without significant focal deficits. Denies recent exacerbations. Prior MRI Brain from 2014 independently reviewed and c/w MS with moderate periventricular T2 hyperintensities. No evidence of cervical lesions on MRI C spine. No evidence of radiographic progression of disease activity on Avonex on review of MRI Brain 10/2017. Will obtain updated imaging to evaluate for efficacy of multiple sclerosis disease modifying therapy. Disease activity in MS is often not immediately detectable on history or examination, but is sensitively identified on MRI. If identified, new or active MS lesions on MRI may represent suboptimal response to MS therapy and would change medical management. We reviewed the multiple sclerosis diagnosis, prognosis, and implications.  We reviewed the 3-pronged treatment strategy: treating acute flares, using preventative treatments to prevent future relapses and brain lesions, and treating residual symptoms. For long-term treatment, I recommend continuation of Avonex. We did discuss oral options that she has not yet tried along with potential side effects due to her discomfort with injections. Explained that oral therapy does carry increased risk for opportunistic infections such as PML. The only oral therapy without associated PML risk (Aubagio) may result in hair loss and liver toxicity. Given her medical comorbidities, we agreed upon continuation of current therapy. Plan:   Repeat MRI Brain WWO  Trial of Nortriptyline 25mg qhs to assist with headaches, sleep  Cont. Avonex once weekly injections  Cont. Vit D supplementation      Follow-up Disposition:  Return in about 6 months (around 5/1/2019).     Signed:  Yates Felty, DO  11/1/2018  1:59 PM

## 2018-11-08 RX ORDER — OMEPRAZOLE 20 MG/1
20 CAPSULE, DELAYED RELEASE ORAL DAILY
Qty: 30 CAP | Refills: 5 | Status: SHIPPED | OUTPATIENT
Start: 2018-11-08 | End: 2019-06-04 | Stop reason: SDUPTHER

## 2018-11-08 NOTE — PROGRESS NOTES
Requested Prescriptions     Signed Prescriptions Disp Refills    omeprazole (PRILOSEC) 20 mg capsule 30 Cap 5     Sig: Take 1 Cap by mouth daily. D/C nexium    PerDr. Calli Deras, verbal order received.

## 2018-11-12 NOTE — TELEPHONE ENCOUNTER
Requested Prescriptions     Pending Prescriptions Disp Refills    interferon beta-1a (AVONEX) 30 mcg/0.5 mL pnij 4 Pen 11     Si.5 mL by IntraMUSCular route every seven (7) days

## 2019-01-29 RX ORDER — MICONAZOLE NITRATE 2 %
1 CREAM WITH APPLICATOR VAGINAL DAILY
Qty: 30 TAB | Refills: 5 | Status: SHIPPED | OUTPATIENT
Start: 2019-01-29 | End: 2019-04-17 | Stop reason: SDUPTHER

## 2019-02-09 ENCOUNTER — HOSPITAL ENCOUNTER (EMERGENCY)
Age: 69
Discharge: HOME | End: 2019-02-09
Attending: EMERGENCY MEDICINE
Payer: MEDICAID

## 2019-02-09 VITALS
SYSTOLIC BLOOD PRESSURE: 170 MMHG | DIASTOLIC BLOOD PRESSURE: 91 MMHG | HEART RATE: 84 BPM | OXYGEN SATURATION: 94 % | RESPIRATION RATE: 16 BRPM | TEMPERATURE: 98 F

## 2019-02-09 DIAGNOSIS — K92.0 HEMATEMESIS, PRESENCE OF NAUSEA NOT SPECIFIED: Primary | ICD-10-CM

## 2019-02-09 LAB
ALBUMIN SERPL-MCNC: 3.7 G/DL (ref 3.5–5)
ALBUMIN/GLOB SERPL: 0.9 {RATIO} (ref 1.1–2.2)
ALP SERPL-CCNC: 133 U/L (ref 45–117)
ALT SERPL-CCNC: 34 U/L (ref 12–78)
ANION GAP SERPL CALC-SCNC: 7 MMOL/L (ref 5–15)
APTT PPP: 27.1 SEC (ref 22.1–32)
AST SERPL-CCNC: 22 U/L (ref 15–37)
BASOPHILS # BLD: 0 K/UL (ref 0–0.1)
BASOPHILS NFR BLD: 1 % (ref 0–1)
BILIRUB SERPL-MCNC: 0.2 MG/DL (ref 0.2–1)
BUN SERPL-MCNC: 14 MG/DL (ref 6–20)
BUN/CREAT SERPL: 19 (ref 12–20)
CALCIUM SERPL-MCNC: 9 MG/DL (ref 8.5–10.1)
CHLORIDE SERPL-SCNC: 104 MMOL/L (ref 97–108)
CO2 SERPL-SCNC: 30 MMOL/L (ref 21–32)
COMMENT, HOLDF: NORMAL
CREAT SERPL-MCNC: 0.75 MG/DL (ref 0.55–1.02)
DIFFERENTIAL METHOD BLD: ABNORMAL
EOSINOPHIL # BLD: 0.1 K/UL (ref 0–0.4)
EOSINOPHIL NFR BLD: 2 % (ref 0–7)
ERYTHROCYTE [DISTWIDTH] IN BLOOD BY AUTOMATED COUNT: 13.2 % (ref 11.5–14.5)
GLOBULIN SER CALC-MCNC: 4.2 G/DL (ref 2–4)
GLUCOSE SERPL-MCNC: 82 MG/DL (ref 65–100)
HCT VFR BLD AUTO: 41.7 % (ref 35–47)
HGB BLD-MCNC: 13 G/DL (ref 11.5–16)
IMM GRANULOCYTES # BLD AUTO: 0 K/UL (ref 0–0.04)
IMM GRANULOCYTES NFR BLD AUTO: 1 % (ref 0–0.5)
INR PPP: 1 (ref 0.9–1.1)
LIPASE SERPL-CCNC: 134 U/L (ref 73–393)
LYMPHOCYTES # BLD: 2.3 K/UL (ref 0.8–3.5)
LYMPHOCYTES NFR BLD: 38 % (ref 12–49)
MCH RBC QN AUTO: 25.4 PG (ref 26–34)
MCHC RBC AUTO-ENTMCNC: 31.2 G/DL (ref 30–36.5)
MCV RBC AUTO: 81.4 FL (ref 80–99)
MONOCYTES # BLD: 0.7 K/UL (ref 0–1)
MONOCYTES NFR BLD: 12 % (ref 5–13)
NEUTS SEG # BLD: 2.8 K/UL (ref 1.8–8)
NEUTS SEG NFR BLD: 47 % (ref 32–75)
NRBC # BLD: 0 K/UL (ref 0–0.01)
NRBC BLD-RTO: 0 PER 100 WBC
PLATELET # BLD AUTO: 234 K/UL (ref 150–400)
PMV BLD AUTO: 10.9 FL (ref 8.9–12.9)
POTASSIUM SERPL-SCNC: 3.3 MMOL/L (ref 3.5–5.1)
PROT SERPL-MCNC: 7.9 G/DL (ref 6.4–8.2)
PROTHROMBIN TIME: 10.3 SEC (ref 9–11.1)
RBC # BLD AUTO: 5.12 M/UL (ref 3.8–5.2)
SAMPLES BEING HELD,HOLD: NORMAL
SODIUM SERPL-SCNC: 141 MMOL/L (ref 136–145)
THERAPEUTIC RANGE,PTTT: NORMAL SECS (ref 58–77)
WBC # BLD AUTO: 5.9 K/UL (ref 3.6–11)

## 2019-02-09 PROCEDURE — 85610 PROTHROMBIN TIME: CPT

## 2019-02-09 PROCEDURE — 36415 COLL VENOUS BLD VENIPUNCTURE: CPT

## 2019-02-09 PROCEDURE — 99283 EMERGENCY DEPT VISIT LOW MDM: CPT

## 2019-02-09 PROCEDURE — 83690 ASSAY OF LIPASE: CPT

## 2019-02-09 PROCEDURE — 85025 COMPLETE CBC W/AUTO DIFF WBC: CPT

## 2019-02-09 PROCEDURE — 80053 COMPREHEN METABOLIC PANEL: CPT

## 2019-02-09 PROCEDURE — 85730 THROMBOPLASTIN TIME PARTIAL: CPT

## 2019-02-10 NOTE — ED TRIAGE NOTES
Patient comes in from home with complaints of spitting blood every night for months. States today, it was darker in color and more quantity than usual. Also endorses abdominal pain and nausea.

## 2019-02-10 NOTE — ED PROVIDER NOTES
79 y.o. female with past medical history significant for HTN, DM, MS, and GERD who presents via private vehicle from home with chief complaint of hematemesis. Patient arrives c/o intermittent episodes of \"spitting up\" blood for the past several months, typically in the middle of the night and early morning. Patient also reports intermittent mild abdominal cramping for the same time with some left flank \"soreness. \" Patient endorses Hx of gallstones - denies resection. Patient reports 1 episode of epistaxis last week. Patient denies melena and hematochezia. Patient is not anticoagulated. There are no other acute medical concerns at this time. Social hx: Never tobacco smoker; Denies EtOH use; Denies illicit drug use PCP: Karen De La Cruz DO Note written by Susan Bustos, as dictated by Michelle Armas MD 9:39 PM 
 
 
 
  
 
Past Medical History:  
Diagnosis Date  Arthritis OSTEO A & OSTEOPOROSIS  Autoimmune disease ([facility] [address] 1999 MS  Depression  Diabetes ([address]  GERD (gastroesophageal reflux disease)  Hepatitis C   
 Hypertension  LBP (low back pain)  Lipoma 9/3/2013  Liver disease CHRONIC HEP C  
 MS (multiple sclerosis) ([address]  Other ill-defined conditions(799.89) MS  
 Other ill-defined conditions(799.89) HX DRUG ABUSE. CLEAN FOR 21 YRS.  Psychiatric disorder DEPRESSION.  Stroke ([facility] [address] Diagnosed 10/2011  Urinary incontinence Past Surgical History:  
Procedure Laterality Date  HX GI    
 COLONOSCOPY X 1  
 HX HYSTERECTOMY  HX LIPOMA RESECTION  9/6/13 Excision of lipoma, left posterior thigh Family History:  
Problem Relation Age of Onset  Arthritis-rheumatoid Mother  Hypertension Mother  Cancer Father  Breast Cancer Maternal Aunt  Breast Cancer Maternal Aunt Social History Socioeconomic History  Marital status: SINGLE Spouse name: Not on file  Number of children: Not on file  Years of education: Not on file  Highest education level: Not on file Social Needs  Financial resource strain: Not on file  Food insecurity - worry: Not on file  Food insecurity - inability: Not on file  Transportation needs - medical: Not on file  Transportation needs - non-medical: Not on file Occupational History  Not on file Tobacco Use  Smoking status: Never Smoker  Smokeless tobacco: Never Used Substance and Sexual Activity  Alcohol use: No  
 Drug use: No  
 Sexual activity: No  
Other Topics Concern  Not on file Social History Narrative  Not on file ALLERGIES: Pcn [penicillins]; Fosamax [alendronate]; and Sulfa (sulfonamide antibiotics) Review of Systems HENT: Positive for nosebleeds (x1). Gastrointestinal: Positive for abdominal pain (intermittent mild cramping) and vomiting (blood). Negative for blood in stool.  
     -melena Genitourinary: Positive for flank pain (left soreness). All other systems reviewed and are negative. Vitals:  
 02/09/19 2113 BP: (!) 182/92 Pulse: 62 Resp: 16 Temp: 98 °F (36.7 °C) SpO2: 96% Physical Exam  
Constitutional: She is oriented to person, place, and time. She appears well-developed and well-nourished. No distress. HENT:  
Head: Normocephalic and atraumatic. Eyes: Conjunctivae are normal. No scleral icterus. Neck: Neck supple. No tracheal deviation present. Cardiovascular: Normal rate, regular rhythm, normal heart sounds and intact distal pulses. Exam reveals no gallop and no friction rub. No murmur heard. Pulmonary/Chest: Effort normal and breath sounds normal. She has no wheezes. She has no rales. Abdominal: Soft. She exhibits no distension. There is no tenderness. There is no rebound and no guarding. Musculoskeletal: She exhibits no edema. Neurological: She is alert and oriented to person, place, and time. Skin: Skin is warm and dry. No rash noted. Psychiatric: She has a normal mood and affect. Nursing note and vitals reviewed. Note written by Susan Bustos, as dictated by Michelle Armas MD 9:39 PM  
 
[facility] Procedures PROGRESS NOTE: 
10:50 PM 
Hgb stable. Patient is supposed to have an upper endoscopy done but has not scheduled yet. Patient is already on omeprazole. Recommended f/u with Dr. Carroll Staff.

## 2019-02-20 ENCOUNTER — HOSPITAL ENCOUNTER (OUTPATIENT)
Dept: MAMMOGRAPHY | Age: 69
Discharge: HOME | End: 2019-02-20
Attending: INTERNAL MEDICINE | Admitting: INTERNAL MEDICINE
Payer: MEDICAID

## 2019-02-20 ENCOUNTER — TELEPHONE (OUTPATIENT)
Dept: INTERNAL MEDICINE CLINIC | Age: 69
End: 2019-02-20

## 2019-02-20 DIAGNOSIS — Z12.31 ENCOUNTER FOR SCREENING MAMMOGRAM FOR BREAST CANCER: ICD-10-CM

## 2019-02-20 PROCEDURE — 77067 SCR MAMMO BI INCL CAD: CPT

## 2019-02-20 NOTE — TELEPHONE ENCOUNTER
----- Message from Brunilda Renee sent at 2/20/2019  2:49 PM EST -----  Regarding: Dr. Igor Wolff is requesting a call back from the office in reference to a diabetic monitor.  Phone: [phone]

## 2019-02-22 RX ORDER — LANCETS
EACH MISCELLANEOUS
Qty: 1 EACH | Refills: 11 | Status: SHIPPED | OUTPATIENT
Start: 2019-02-22 | End: 2019-04-29 | Stop reason: SDUPTHER

## 2019-02-22 RX ORDER — INSULIN PUMP SYRINGE, 3 ML
EACH MISCELLANEOUS
Qty: 1 KIT | Refills: 0 | Status: SHIPPED | OUTPATIENT
Start: 2019-02-22

## 2019-02-25 ENCOUNTER — OFFICE VISIT (OUTPATIENT)
Dept: INTERNAL MEDICINE CLINIC | Age: 69
End: 2019-02-25

## 2019-02-25 VITALS
RESPIRATION RATE: 18 BRPM | TEMPERATURE: 95.4 F | BODY MASS INDEX: 29.95 KG/M2 | HEART RATE: 64 BPM | WEIGHT: 169 LBS | OXYGEN SATURATION: 96 % | SYSTOLIC BLOOD PRESSURE: 147 MMHG | DIASTOLIC BLOOD PRESSURE: 74 MMHG | HEIGHT: 63 IN

## 2019-02-25 DIAGNOSIS — G35 MS (MULTIPLE SCLEROSIS) (HCC): ICD-10-CM

## 2019-02-25 DIAGNOSIS — E11.9 TYPE 2 DIABETES MELLITUS WITHOUT COMPLICATION, WITH LONG-TERM CURRENT USE OF INSULIN (HCC): ICD-10-CM

## 2019-02-25 DIAGNOSIS — Z79.4 TYPE 2 DIABETES MELLITUS WITHOUT COMPLICATION, WITH LONG-TERM CURRENT USE OF INSULIN (HCC): ICD-10-CM

## 2019-02-25 DIAGNOSIS — M06.9 RHEUMATOID ARTHRITIS, INVOLVING UNSPECIFIED SITE, UNSPECIFIED RHEUMATOID FACTOR PRESENCE: ICD-10-CM

## 2019-02-25 DIAGNOSIS — F33.42 RECURRENT MAJOR DEPRESSIVE DISORDER, IN FULL REMISSION (HCC): ICD-10-CM

## 2019-02-25 DIAGNOSIS — B18.2 HEP C W/O COMA, CHRONIC (HCC): Primary | ICD-10-CM

## 2019-02-25 LAB — HBA1C MFR BLD HPLC: 9.1 %

## 2019-02-25 RX ORDER — LANOLIN ALCOHOL/MO/W.PET/CERES
3 CREAM (GRAM) TOPICAL
COMMUNITY

## 2019-02-25 RX ORDER — AMMONIUM LACTATE 12 G/100G
LOTION TOPICAL
Qty: 222 G | Refills: 5 | Status: SHIPPED | OUTPATIENT
Start: 2019-02-25 | End: 2019-02-26 | Stop reason: SDUPTHER

## 2019-02-25 RX ORDER — AMMONIUM LACTATE 12 G/100G
LOTION TOPICAL AS NEEDED
COMMUNITY
End: 2019-02-25 | Stop reason: SDUPTHER

## 2019-02-25 NOTE — PROGRESS NOTES
Results for orders placed or performed in visit on 02/25/19 AMB POC HEMOGLOBIN A1C Result Value Ref Range Hemoglobin A1c (POC) 9.1 %

## 2019-02-25 NOTE — PROGRESS NOTES
Health Maintenance Due Topic Date Due  
 COLONOSCOPY  07/18/1970  
 DTaP/Tdap/Td series (1 - Tdap) 07/18/1973  Shingrix Vaccine Age 50> (1 of 2) 07/18/2002  Influenza Age 5 to Adult  08/01/2018  HEMOGLOBIN A1C Q6M  02/14/2019 Chief Complaint Patient presents with  Hypertension Routine Care  Diabetes  Multiple Sclerosis 1. Have you been to the ER, urgent care clinic since your last visit? Hospitalized since your last visit? Yes When: 2/16/2019, [address] [place] [address] ER for spitting up blood 2. Have you seen or consulted any other health care providers outside of the [address] [place] [address] since your last visit? Include any pap smears or colon screening. No 
 
 
3) Do you have an Advance Directive on file? no 
 
4) Are you interested in receiving information on Advance Directives? NO Patient is accompanied by self I have received verbal consent from Vicky Spaulding to discuss any/all medical information while they are present in the room.

## 2019-02-26 RX ORDER — AMMONIUM LACTATE 12 G/100G
LOTION TOPICAL
Qty: 222 G | Refills: 5 | Status: SHIPPED | OUTPATIENT
Start: 2019-02-26 | End: 2019-04-04

## 2019-02-28 ENCOUNTER — HOSPITAL ENCOUNTER (OUTPATIENT)
Age: 69
Setting detail: OUTPATIENT SURGERY
Discharge: HOME | End: 2019-02-28
Attending: SPECIALIST | Admitting: SPECIALIST

## 2019-02-28 NOTE — PROGRESS NOTES
HISTORY OF PRESENT ILLNESS Rachana Oviedo is a 79 y.o. female. Pt. comes in for f/u. Has multiple medical problems. Her BP is stable. Not checking sugars at home closely. A1c in the office is higher at 9.1. Recent ER visit with GI issues. I reviewed records. All studies were stable. Followed by neurologist for MS. Followed by rheumatologist for RA. Reports chronic arthritic pains. Denies tobacco or alcohol use. History of hepatitis C. Has been followed by hepatologist.  Reports compliance with medications and diet. Med list and most recent labs/studies reviewed with pt. Trying to be active physically to control weight. Reports no other new c/o. HPI Review of Systems Constitutional: Negative. HENT: Negative. Eyes: Negative for blurred vision. Respiratory: Negative for shortness of breath. Cardiovascular: Negative for chest pain and leg swelling. Gastrointestinal: Positive for heartburn. Negative for abdominal pain. Genitourinary: Negative for dysuria. Musculoskeletal: Positive for joint pain. Negative for falls. Skin: Negative. Neurological: Positive for tingling and sensory change. Negative for focal weakness. Endo/Heme/Allergies: Negative for polydipsia. Psychiatric/Behavioral: Positive for depression. The patient is nervous/anxious and has insomnia. All other systems reviewed and are negative. Physical Exam  
Constitutional: She is oriented to person, place, and time. She appears well-developed and well-nourished. No distress. overweight HENT:  
Head: Normocephalic and atraumatic. Mouth/Throat: Oropharynx is clear and moist.  
Eyes: Conjunctivae are normal. Pupils are equal, round, and reactive to light. No scleral icterus. Neck: Normal range of motion. Neck supple. No JVD present. No thyromegaly present. Cardiovascular: Normal rate, regular rhythm, normal heart sounds and intact distal pulses. No murmur heard. Pulmonary/Chest: Effort normal and breath sounds normal. No respiratory distress. She has no wheezes. She has no rales. Abdominal: Soft. Bowel sounds are normal. She exhibits no distension. There is no tenderness. obese Musculoskeletal: She exhibits tenderness (lumbars/knees/hands w/o clear RA changes,,, ). She exhibits no edema. djd Neurological: She is alert and oriented to person, place, and time. Coordination normal.  
Skin: Skin is warm and dry. No rash noted. Psychiatric: Her behavior is normal.  
Chronically depressed Nursing note and vitals reviewed. ASSESSMENT and PLAN Diagnoses and all orders for this visit: 1. Hep C w/o coma, chronic (HCC) 2. MS (multiple sclerosis) ([facility] [address] 3. Recurrent major depressive disorder, in full remission ([facility] [address] 4. Rheumatoid arthritis, involving unspecified site, unspecified rheumatoid factor presence ([facility] [address] 5. Type 2 diabetes mellitus without complication, with long-term current use of insulin ([facility] [address] -     AMB POC HEMOGLOBIN A1C Increase Lantus from 10-15 units daily Continue glipizide and Januvia Monitor sugars at home with goal of 100-150 Follow-up Disposition: 
Return in about 4 months (around 6/25/2019). lab results and schedule of future lab studies reviewed with patient 
reviewed diet, exercise and weight control 
reviewed medications and side effects in detail 
low cholesterol diet, weight control and daily exercise discussed, home glucose monitoring emphasized, all medications, side effects and compliance discussed carefully, foot care discussed and Podiatry visits discussed, annual eye examinations at Ophthalmology discussed, glycohemoglobin and other lab monitoring discussed and long term diabetic complications discussed F/u with other MD's as scheduled

## 2019-03-05 ENCOUNTER — TELEPHONE (OUTPATIENT)
Dept: INTERNAL MEDICINE CLINIC | Age: 69
End: 2019-03-05

## 2019-03-06 NOTE — TELEPHONE ENCOUNTER
Message sent to Dr. Sona White for referral to Oncologist, Dr. Wisam Bailey for mass on chin. Awaiting MD response.

## 2019-03-08 ENCOUNTER — TELEPHONE (OUTPATIENT)
Dept: INTERNAL MEDICINE CLINIC | Age: 69
End: 2019-03-08

## 2019-03-08 DIAGNOSIS — R22.0 MASS OF CHIN: Primary | ICD-10-CM

## 2019-03-11 DIAGNOSIS — R22.0 MASS OF CHIN: Primary | ICD-10-CM

## 2019-03-11 NOTE — TELEPHONE ENCOUNTER
Referral changed to dermatology referral and pt made aware and contact information for dermatology provided and pt voiced understanding

## 2019-03-18 RX ORDER — CLONIDINE HYDROCHLORIDE 0.1 MG/1
TABLET ORAL
Qty: 62 TAB | Refills: 5 | Status: SHIPPED | OUTPATIENT
Start: 2019-03-18 | End: 2019-08-27 | Stop reason: SDUPTHER

## 2019-03-18 RX ORDER — GLIPIZIDE 10 MG/1
TABLET ORAL
Qty: 62 TAB | Refills: 5 | Status: SHIPPED | OUTPATIENT
Start: 2019-03-18 | End: 2019-10-31 | Stop reason: SDUPTHER

## 2019-03-27 ENCOUNTER — OFFICE VISIT (OUTPATIENT)
Dept: DERMATOLOGY | Facility: AMBULATORY SURGERY CENTER | Age: 69
End: 2019-03-27

## 2019-03-27 VITALS
HEART RATE: 65 BPM | HEIGHT: 63 IN | OXYGEN SATURATION: 92 % | RESPIRATION RATE: 15 BRPM | DIASTOLIC BLOOD PRESSURE: 78 MMHG | WEIGHT: 169 LBS | SYSTOLIC BLOOD PRESSURE: 140 MMHG | TEMPERATURE: 98.1 F | BODY MASS INDEX: 29.95 KG/M2

## 2019-03-27 DIAGNOSIS — D22.9 MULTIPLE BENIGN NEVI: ICD-10-CM

## 2019-03-27 DIAGNOSIS — I78.1 SPIDER VEINS: ICD-10-CM

## 2019-03-27 DIAGNOSIS — L30.9 HAND DERMATITIS: Primary | ICD-10-CM

## 2019-03-27 RX ORDER — TRIAMCINOLONE ACETONIDE 1 MG/G
CREAM TOPICAL 2 TIMES DAILY
Qty: 45 G | Refills: 1 | Status: SHIPPED | OUTPATIENT
Start: 2019-03-27 | End: 2019-08-27 | Stop reason: SDUPTHER

## 2019-03-27 NOTE — PROGRESS NOTES
Written by Anil Johnson, as dictated by Casper Grajeda Ask, [address]. Name: Jeremy Wong       Age: 79 y.o. Date: 3/27/2019    Chief Complaint:   Chief Complaint   Patient presents with    Skin Exam     spot on chin and left middle finger    New Patient       Subjective:    HPI:  Ms.. Jeremy Wong is a 79 y.o. female who presents for the evaluation of a lesion on the chin. The patient was referred by Dr. Jayla Wilson for this evaluation. She states that the rash area appeared about 2 years ago. The patient has not had prior treatment for this lesion. Associated symptoms include persistent purple and red pigment change on the chin. She also reports itchy and raised lesions on the fingers that have been present for about 1 year.      ROS: Consitutional: Negative  Dermatological : negative    Social History     Socioeconomic History    Marital status: SINGLE     Spouse name: Not on file    Number of children: Not on file    Years of education: Not on file    Highest education level: Not on file   Occupational History    Not on file   Social Needs    Financial resource strain: Not on file    Food insecurity:     Worry: Not on file     Inability: Not on file    Transportation needs:     Medical: Not on file     Non-medical: Not on file   Tobacco Use    Smoking status: Never Smoker    Smokeless tobacco: Never Used   Substance and Sexual Activity    Alcohol use: No    Drug use: No    Sexual activity: Never   Lifestyle    Physical activity:     Days per week: Not on file     Minutes per session: Not on file    Stress: Not on file   Relationships    Social connections:     Talks on phone: Not on file     Gets together: Not on file     Attends Caodaism service: Not on file     Active member of club or organization: Not on file     Attends meetings of clubs or organizations: Not on file     Relationship status: Not on file    Intimate partner violence:     Fear of current or ex partner: Not on file     Emotionally abused: Not on file     Physically abused: Not on file     Forced sexual activity: Not on file   Other Topics Concern    Not on file   Social History Narrative    Not on file       Family History   Problem Relation Age of Onset    Arthritis-rheumatoid Mother     Hypertension Mother     Cancer Father     Breast Cancer Maternal Aunt     Breast Cancer Maternal Aunt        Past Medical History:   Diagnosis Date    Arthritis     OSTEO A & OSTEOPOROSIS     Autoimmune disease ([facility] [address] 1999    MS    Depression     Diabetes ([facility] [address]     GERD (gastroesophageal reflux disease)     Hepatitis C     Hypertension     LBP (low back pain)     Lipoma 9/3/2013    Liver disease     CHRONIC HEP C    MS (multiple sclerosis) ([facility] [address]     Other ill-defined conditions(799.89)     MS    Other ill-defined conditions(799.89)     HX DRUG ABUSE. CLEAN FOR 21 YRS.  Psychiatric disorder     DEPRESSION.  Stroke [facility])     Diagnosed 10/2011    Urinary incontinence        Past Surgical History:   Procedure Laterality Date    HX GI      COLONOSCOPY X 1    HX HYSTERECTOMY      HX LIPOMA RESECTION  9/6/13     Excision of lipoma, left posterior thigh       Current Outpatient Medications   Medication Sig Dispense Refill    cloNIDine HCl (CATAPRES) 0.1 mg tablet TAKE 1 TABLET BY MOUTH  TWICE A DAY 62 Tab 5    glipiZIDE (GLUCOTROL) 10 mg tablet TAKE 1 TABLET BY MOUTH TWO  TIMES DAILY 62 Tab 5    SITagliptin (JANUVIA) 100 mg tablet TAKE 1 TABLET BY MOUTH  EVERY DAY 31 Tab 5    ammonium lactate (LAC-HYDRIN) 12 % lotion rub in to affected area well 222 g 5    melatonin 3 mg tablet Take  by mouth.       Blood-Glucose Meter monitoring kit Use to check BS 2 x a day 1 Kit 0    lancets misc Use to check BS 2 x a day 1 Each 11    metoprolol succinate (TOPROL-XL) 200 mg XL tablet TAKE 1 TABLET BY MOUTH  EVERY DAY 31 Tab 5    amLODIPine (NORVASC) 10 mg tablet TAKE 1 TABLET BY MOUTH  EVERY DAY 31 Tab 5  citalopram (CELEXA) 20 mg tablet TAKE 1 TABLET BY MOUTH  DAILY 31 Tab 5    atorvastatin (LIPITOR) 20 mg tablet TAKE 1 TABLET BY MOUTH  NIGHTLY 31 Tab 5    calcium citrate-vitamin D3 (CITRACAL WITH VITAMIN D MAXIMUM) tablet Take 1 Tab by mouth daily. 30 Tab 5    interferon beta-1a (AVONEX) 30 mcg/0.5 mL pnij 0.5 mL by IntraMUSCular route every seven (7) days 4 Pen 11    omeprazole (PRILOSEC) 20 mg capsule Take 1 Cap by mouth daily. D/C nexium 30 Cap 5    nortriptyline (PAMELOR) 25 mg capsule Take 1 Cap by mouth nightly. 90 Cap 0    insulin glargine (LANTUS SOLOSTAR U-100 INSULIN) 100 unit/mL (3 mL) inpn 10 units at bedtime 15 Pen 5    risedronate (ACTONEL) 150 mg tablet Take 1 Tab by mouth every thirty (30) days. 3 Tab 3    PROVENTIL HFA 90 mcg/actuation inhaler INHALE 1 PUFF BY MOUTH EVERY 6 HOURS AS NEEDED FOR WHEEZING 1 Inhaler 5    Insulin Needles, Disposable, 30 gauge x 1/3\" 10 Units by SubCUTAneous route nightly. 1 Package 11    fluticasone (FLONASE) 50 mcg/actuation nasal spray 2 Sprays by Both Nostrils route daily. 1 Bottle 1    aspirin 81 mg chewable tablet Take 81 mg by mouth daily.  ascorbic acid, vitamin C, (VITAMIN C) 500 mg tablet Take 500 mg by mouth daily.  zolpidem (AMBIEN) 5 mg tablet Take 1 Tab by mouth nightly as needed for Sleep. 30 Tab 0       Allergies   Allergen Reactions    Pcn [Penicillins] Swelling    Fosamax [Alendronate] Swelling    Sulfa (Sulfonamide Antibiotics) Itching         Objective:    Visit Vitals  /78 (BP 1 Location: Left arm, BP Patient Position: Sitting)   Pulse 65   Temp 98.1 °F (36.7 °C) (Oral)   Resp 15   Ht 5' 3\" (1.6 m)   Wt 169 lb (76.7 kg)   SpO2 92%   BMI 29.94 kg/m²       Zachery Reddy is a 79 y.o. female who appears well and in no distress. She is awake, alert, and oriented. A limited skin examination was completed including the chin and hands. There are blanchable dilated vessels on the chin without concerning features. There are hyperpigmented macules on the fingers and there is slight scaling on the right third finger, consistent with hand dermatitis. She has a skin toned and brown intradermal nevus on the chin without concerning features as well. Assessment/Plan:    1. Dilated vessels, chin. The diagnosis was discussed. The patient was reassured that no treatment is necessary at this time. I recommended laser treatment if pt desires. 2. Hand dermatitis. The differential diagnoses were reviewed. I prescribed Triamcinolone on the area BID for 2 weeks and then as needed. I advised to wear gloves when using cleaning supplies and washing dishes. 3. Normal nevi. The diagnosis of normal nevi was reviewed. I discussed sun protection, sunscreen use, the warning signs of skin cancer, the need for self-skin examinations, and the need for regular practitioner exams. The patient should follow up sooner as needed if new, changing, or symptomatic skin lesions arise. This plan was reviewed with the patient and patient agrees. All questions were answered. This scribe documentation was reviewed by me and accurately reflects the examination and decisions made by me.

## 2019-03-28 ENCOUNTER — TELEPHONE (OUTPATIENT)
Dept: INTERNAL MEDICINE CLINIC | Age: 69
End: 2019-03-28

## 2019-03-28 NOTE — TELEPHONE ENCOUNTER
Pt called and advised that she needs Rx for freestyle antonio diabetic monitoring kit sent to [facility].

## 2019-04-08 ENCOUNTER — TELEPHONE (OUTPATIENT)
Dept: INTERNAL MEDICINE CLINIC | Age: 69
End: 2019-04-08

## 2019-04-08 NOTE — TELEPHONE ENCOUNTER
----- Message from Alex Alvarenga sent at 4/8/2019  4:45 PM EDT -----  Regarding: Dr. Bo Lott with [facility]) is calling regarding pt needing a PA Free Style Meter for diabetes for preautherzation ([phone]). Best contact Ms. Cindi Evert [phone].

## 2019-04-17 RX ORDER — MICONAZOLE NITRATE 2 %
1 CREAM WITH APPLICATOR VAGINAL DAILY
Qty: 30 TAB | Refills: 5 | Status: SHIPPED | OUTPATIENT
Start: 2019-04-17

## 2019-04-24 ENCOUNTER — TELEPHONE (OUTPATIENT)
Dept: INTERNAL MEDICINE CLINIC | Age: 69
End: 2019-04-24

## 2019-04-24 NOTE — TELEPHONE ENCOUNTER
Patient states insurance will not cover the true metrix kit  She is calling to request a new script for 1 touch Vario kit, lancets and test strips to be filled through [facility] [phone]

## 2019-04-25 DIAGNOSIS — Z79.4 TYPE 2 DIABETES MELLITUS WITHOUT COMPLICATION, WITH LONG-TERM CURRENT USE OF INSULIN (HCC): Primary | ICD-10-CM

## 2019-04-25 DIAGNOSIS — E11.9 TYPE 2 DIABETES MELLITUS WITHOUT COMPLICATION, WITH LONG-TERM CURRENT USE OF INSULIN (HCC): Primary | ICD-10-CM

## 2019-04-25 NOTE — TELEPHONE ENCOUNTER
Per verbal order from provider order for verio and strips ordered and faxed to [facility] per pt request

## 2019-04-29 RX ORDER — LANCETS
EACH MISCELLANEOUS
Qty: 1 EACH | Refills: 11 | Status: SHIPPED | OUTPATIENT
Start: 2019-04-29 | End: 2019-09-26 | Stop reason: SDUPTHER

## 2019-08-23 RX ORDER — PEN NEEDLE, DIABETIC 31 GX5/16"
NEEDLE, DISPOSABLE MISCELLANEOUS
Qty: 100 PEN NEEDLE | Refills: 5 | Status: SHIPPED | OUTPATIENT
Start: 2019-08-23 | End: 2020-10-27

## 2019-08-28 RX ORDER — TRIAMCINOLONE ACETONIDE 1 MG/G
CREAM TOPICAL
Qty: 30 G | Refills: 1 | Status: SHIPPED | OUTPATIENT
Start: 2019-08-28 | End: 2020-12-18 | Stop reason: ALTCHOICE

## 2019-08-29 RX ORDER — ATORVASTATIN CALCIUM 20 MG/1
TABLET, FILM COATED ORAL
Qty: 31 TAB | Refills: 5 | Status: SHIPPED | OUTPATIENT
Start: 2019-08-29 | End: 2020-01-07 | Stop reason: SDUPTHER

## 2019-08-29 RX ORDER — METOPROLOL SUCCINATE 200 MG/1
TABLET, EXTENDED RELEASE ORAL
Qty: 31 TAB | Refills: 5 | Status: SHIPPED | OUTPATIENT
Start: 2019-08-29 | End: 2020-02-17 | Stop reason: SDUPTHER

## 2019-08-29 RX ORDER — AMLODIPINE BESYLATE 10 MG/1
TABLET ORAL
Qty: 31 TAB | Refills: 5 | Status: SHIPPED | OUTPATIENT
Start: 2019-08-29 | End: 2020-02-17 | Stop reason: SDUPTHER

## 2019-08-29 RX ORDER — CITALOPRAM 20 MG/1
TABLET, FILM COATED ORAL
Qty: 31 TAB | Refills: 5 | Status: SHIPPED | OUTPATIENT
Start: 2019-08-29 | End: 2020-03-17 | Stop reason: SDUPTHER

## 2019-08-29 RX ORDER — CLONIDINE HYDROCHLORIDE 0.1 MG/1
TABLET ORAL
Qty: 62 TAB | Refills: 5 | Status: SHIPPED | OUTPATIENT
Start: 2019-08-29 | End: 2019-09-16 | Stop reason: SDUPTHER

## 2019-08-30 ENCOUNTER — OFFICE VISIT (OUTPATIENT)
Dept: INTERNAL MEDICINE CLINIC | Age: 69
End: 2019-08-30

## 2019-08-30 VITALS
OXYGEN SATURATION: 97 % | TEMPERATURE: 97.3 F | WEIGHT: 164 LBS | DIASTOLIC BLOOD PRESSURE: 72 MMHG | SYSTOLIC BLOOD PRESSURE: 120 MMHG | BODY MASS INDEX: 29.06 KG/M2 | HEART RATE: 61 BPM | HEIGHT: 63 IN | RESPIRATION RATE: 18 BRPM

## 2019-08-30 DIAGNOSIS — Z12.11 COLON CANCER SCREENING: ICD-10-CM

## 2019-08-30 DIAGNOSIS — E11.9 TYPE 2 DIABETES MELLITUS WITHOUT COMPLICATION, WITH LONG-TERM CURRENT USE OF INSULIN (HCC): Primary | ICD-10-CM

## 2019-08-30 DIAGNOSIS — K21.9 GASTROESOPHAGEAL REFLUX DISEASE WITHOUT ESOPHAGITIS: ICD-10-CM

## 2019-08-30 DIAGNOSIS — Z79.4 TYPE 2 DIABETES MELLITUS WITHOUT COMPLICATION, WITH LONG-TERM CURRENT USE OF INSULIN (HCC): Primary | ICD-10-CM

## 2019-08-30 DIAGNOSIS — M06.9 RHEUMATOID ARTHRITIS, INVOLVING UNSPECIFIED SITE, UNSPECIFIED RHEUMATOID FACTOR PRESENCE: ICD-10-CM

## 2019-08-30 DIAGNOSIS — G35 MS (MULTIPLE SCLEROSIS) (HCC): ICD-10-CM

## 2019-08-30 DIAGNOSIS — I10 ESSENTIAL HYPERTENSION: ICD-10-CM

## 2019-08-30 DIAGNOSIS — B18.2 HEP C W/O COMA, CHRONIC (HCC): ICD-10-CM

## 2019-08-30 NOTE — PROGRESS NOTES
Madeline Ascencio is a 81 y.o. female      Chief Complaint   Patient presents with    Diabetes    Hypertension    Depression    Headache     1. Have you been to the ER, urgent care clinic since your last visit? Hospitalized since your last visit? No     2. Have you seen or consulted any other health care providers outside of the [address] [place] [address] since your last visit? Include any pap smears or colon screening.   No      Visit Vitals  /72 (BP 1 Location: Right arm, BP Patient Position: Sitting)   Pulse 61   Temp 97.3 °F (36.3 °C) (Oral)   Resp 18   Ht 5' 3\" (1.6 m)   Wt 164 lb (74.4 kg)   SpO2 97%   BMI 29.05 kg/m²

## 2019-08-31 NOTE — PROGRESS NOTES
HISTORY OF PRESENT ILLNESS  Donna Wadsworth is a 81 y.o. female. Pt. comes in for f/u. Has multiple medical problems. BP and DM have been stable. Followed by different specialists. History of hepatitis C, MS, rheumatoid arthritis. Continues to have chronic arthritic pains. Her GI issues have been stable. Denies tobacco or alcohol use. Reports compliance with medications and diet. Med list and most recent labs/studies reviewed with pt. Trying to be active physically to control weight. Refusing colonoscopy but agrees to Cologuard. Due for repeat labs. Reports no other new c/o. HPI    Review of Systems   Constitutional: Negative. HENT: Negative. Eyes: Negative for blurred vision. Respiratory: Negative for shortness of breath. Cardiovascular: Negative for chest pain and leg swelling. Gastrointestinal: Positive for heartburn. Negative for abdominal pain. Genitourinary: Negative for dysuria. Musculoskeletal: Positive for joint pain. Negative for falls. Skin: Negative. Neurological: Positive for tingling and sensory change. Negative for focal weakness. Endo/Heme/Allergies: Negative for polydipsia. Psychiatric/Behavioral: Positive for depression. The patient is nervous/anxious and has insomnia. All other systems reviewed and are negative. Physical Exam   Constitutional: She is oriented to person, place, and time. She appears well-developed and well-nourished. No distress. overweight   HENT:   Head: Normocephalic and atraumatic. Mouth/Throat: Oropharynx is clear and moist.   Eyes: Pupils are equal, round, and reactive to light. Conjunctivae are normal. No scleral icterus. Neck: Normal range of motion. Neck supple. No JVD present. No thyromegaly present. Cardiovascular: Normal rate, regular rhythm, normal heart sounds and intact distal pulses. No murmur heard. Pulmonary/Chest: Effort normal and breath sounds normal. No respiratory distress. She has no wheezes.  She has no rales.   Abdominal: Soft. Bowel sounds are normal. She exhibits no distension. There is no tenderness. obese   Musculoskeletal: She exhibits tenderness (lumbars/knees/hands w/o clear RA changes,,, ). She exhibits no edema. djd   Neurological: She is alert and oriented to person, place, and time. Coordination normal.   Skin: Skin is warm and dry. No rash noted. Psychiatric: Her behavior is normal.   Chronically depressed     Nursing note and vitals reviewed. ASSESSMENT and PLAN  Diagnoses and all orders for this visit:    1. Type 2 diabetes mellitus without complication, with long-term current use of insulin (HCC)  -     LIPID PANEL  -     METABOLIC PANEL, BASIC  -     ALT  -     AST  -     HEMOGLOBIN A1C WITH EAG  -     MICROALBUMIN, UR, RAND W/ MICROALB/CREAT RATIO    2. Hep C w/o coma, chronic (HCC)    3. MS (multiple sclerosis) ([facility] [address]    4. Rheumatoid arthritis, involving unspecified site, unspecified rheumatoid factor presence ([facility] [address]    5. Essential hypertension    6. Gastroesophageal reflux disease without esophagitis    7. Colon cancer screening  -     COLOGUARD TEST (FECAL DNA COLORECTAL CANCER SCREENING)      Follow-up and Dispositions    · Return in about 4 months (around 12/30/2019).      lab results and schedule of future lab studies reviewed with patient  reviewed diet, exercise and weight control  reviewed medications and side effects in detail  low cholesterol diet, weight control and daily exercise discussed, home glucose monitoring emphasized, all medications, side effects and compliance discussed carefully, foot care discussed and Podiatry visits discussed, annual eye examinations at Ophthalmology discussed, glycohemoglobin and other lab monitoring discussed and long term diabetic complications discussed  F/u with other MD's as scheduled  Fall precautions discussed  Advised patient to lose weight by watching diet (decreasing sugars/carbs/fat, increasing fruits/vegetables), exercising at least 30 minutes daily, getting 7-8 hours of sleep daily, drinking plenty of water, and decreasing stress  Overall stable

## 2019-09-17 RX ORDER — CLONIDINE HYDROCHLORIDE 0.1 MG/1
TABLET ORAL
Qty: 62 TAB | Refills: 5 | Status: SHIPPED | OUTPATIENT
Start: 2019-09-17 | End: 2020-02-17 | Stop reason: SDUPTHER

## 2019-09-23 NOTE — TELEPHONE ENCOUNTER
Requested Prescriptions     Pending Prescriptions Disp Refills    interferon beta-1a (AVONEX) 30 mcg/0.5 mL pnij 4 Pen 3     Si.5 mL by IntraMUSCular route every seven (7) days

## 2019-09-23 NOTE — TELEPHONE ENCOUNTER
Spoke with patient, informed her that [facility] had filled her Avonex a few weeks ago, supplied enough refills to last until her f/u with .

## 2019-10-31 RX ORDER — OMEPRAZOLE 20 MG/1
CAPSULE, DELAYED RELEASE ORAL
Qty: 25 CAP | Refills: 2 | Status: SHIPPED | OUTPATIENT
Start: 2019-10-31 | End: 2020-01-07 | Stop reason: SDUPTHER

## 2019-10-31 RX ORDER — GLIPIZIDE 10 MG/1
TABLET ORAL
Qty: 62 TAB | Refills: 5 | Status: SHIPPED | OUTPATIENT
Start: 2019-10-31 | End: 2020-02-17 | Stop reason: SDUPTHER

## 2019-12-09 ENCOUNTER — TELEPHONE (OUTPATIENT)
Dept: INTERNAL MEDICINE CLINIC | Age: 69
End: 2019-12-09

## 2019-12-11 DIAGNOSIS — M81.6 LOCALIZED OSTEOPOROSIS, UNSPECIFIED PATHOLOGICAL FRACTURE PRESENCE: Primary | ICD-10-CM

## 2019-12-11 RX ORDER — RISEDRONATE SODIUM 150 MG/1
150 TABLET, FILM COATED ORAL
Qty: 3 TAB | Refills: 3 | Status: SHIPPED | OUTPATIENT
Start: 2019-12-11 | End: 2020-01-07 | Stop reason: SDUPTHER

## 2019-12-11 NOTE — TELEPHONE ENCOUNTER
Requested Prescriptions     Pending Prescriptions Disp Refills    risedronate (ACTONEL) 150 mg tablet 3 Tab 3     Sig: Take 1 Tab by mouth every thirty (30) days.      08/30/2019 12/31/2019  [facility]

## 2019-12-13 NOTE — TELEPHONE ENCOUNTER
Faxed last results to [facility], but wrote a note advising that new labs have been ordered but have not yet been drawn.

## 2019-12-18 ENCOUNTER — OFFICE VISIT (OUTPATIENT)
Dept: NEUROLOGY | Age: 69
End: 2019-12-18

## 2019-12-18 VITALS
RESPIRATION RATE: 18 BRPM | OXYGEN SATURATION: 98 % | BODY MASS INDEX: 28.52 KG/M2 | DIASTOLIC BLOOD PRESSURE: 74 MMHG | WEIGHT: 161 LBS | SYSTOLIC BLOOD PRESSURE: 122 MMHG | HEART RATE: 80 BPM

## 2019-12-18 DIAGNOSIS — G35 MS (MULTIPLE SCLEROSIS) (HCC): Primary | ICD-10-CM

## 2019-12-18 DIAGNOSIS — R25.2 MUSCLE CRAMP: ICD-10-CM

## 2019-12-18 DIAGNOSIS — R74.01 TRANSAMINITIS: ICD-10-CM

## 2019-12-18 RX ORDER — LORAZEPAM 0.5 MG/1
TABLET ORAL
Qty: 2 TAB | Refills: 0 | Status: SHIPPED | OUTPATIENT
Start: 2019-12-18 | End: 2020-12-18 | Stop reason: ALTCHOICE

## 2019-12-18 NOTE — PATIENT INSTRUCTIONS
[facility]   Statement to Patients  April 1, 2014      In an effort to ensure the large volume of patient prescription refills is processed in the most efficient and expeditious manner, we are asking our patients to assist us by calling your Pharmacy for all prescription refills, this will include also your  Mail Order Pharmacy. The pharmacy will contact our office electronically to continue the refill process. Please do not wait until the last minute to call your pharmacy. We need at least 48 hours (2days) to fill prescriptions. We also encourage you to call your pharmacy before going to  your prescription to make sure it is ready. With regard to controlled substance prescription refill requests (narcotic refills) that need to be picked up at our office, we ask your cooperation by providing us with at least 72 hours (3days) notice that you will need a refill. We will not refill narcotic prescription refill requests after 4:00pm on any weekday, Monday through Thursday, or after 2:00pm on Fridays, or on the weekends. We encourage everyone to explore another way of getting your prescription refill request processed using [employer], our patient web portal through our electronic medical record system. [employer] is an efficient and effective way to communicate your medication request directly to the office and  downloadable as an carlos enrique on your smart phone . [employer] also features a review functionality that allows you to view your medication list as well as leave messages for your physician. Are you ready to get connected? If so please review the attatched instructions or speak to any of our staff to get you set up right away! Thank you so much for your cooperation. Should you have any questions please contact our Practice Administrator.     The Physicians and Staff,  [facility]

## 2019-12-18 NOTE — PROGRESS NOTES
Neurology Clinic Follow up Note    Patient ID:  Martha Luke  [id number]  20 y.o.  1950      Ms. Chiara Newell is here for follow up today of MS       Last Appointment With Me:  11/1/2018    Interval History:   Martha Luke is a 79 y.o. right handed female with a PMH including DM, HTN, HCV, remote stroke presenting for f/u of MS. Date of onset: 1997-experienced knee buckling with falls, paresthesias L side later progressing to both feet, +fatigue, +optic neuritis b/l  Dx made via MRI and LP. Date of diagnosis: 1999    Disease course from Onset: RRMS    Disease course last year: RRMS, stable    Last imaging: MRI Brain [facility] COGNITIVE DISORDERS 10/2017  Extensive intra-axial signal alterations are stable in appearance in  keeping with patient's diagnosis of multiple sclerosis. No enhancing lesion is  demonstrated. Medications for MS Used in the Past:   Avonex (current)- doesn't like injections due to injection site tenderness  Tecfidera- could not swallow pills, +nausea, flushing    Interval history:  Patient reports she is having some stiffness/cramping into the feet b/l for the past year, increasing over time. She is also having some pain into her left hip intermittently. She endorses rather chronic LBP. Denies new focal weakness, numbness, vision loss. Walking is stable, no significant falls. She denies cognitive decline/impairment. Headaches are improved, occurring twice monthly over the R hemisphere. No accompanying nausea or photophobia. She was prescribed Nortriptyline at last visit but did not start this as she lost her prescription. Compliant with Avonex- she doesn't like taking injections due to injection site soreness. Also told recently she had some \"scaring on the liver\". She is interested in alternative therapies. She did not complete repeat neuroimaging as ordered during her last follow up.         Sleep: impaired due to nocturia    Fatigue: severe fatigue    Memory/Concentration:  Denies current issues    Bladder: frequent urination, urinary urgency    Bowel: constipated    Depression: +Anxiety, mild depression on medication. Pain: as described above        PMHx/ PSHx/ FHx/ SHx:  Reviewed and unchanged previous visit. Past Medical History:   Diagnosis Date    Arrhythmia     irregular heatbeat/heart murmur - evaluated and ok    Arthritis     OSTEO A & OSTEOPOROSIS     Autoimmune disease ([facility] [address] 1999    MS    Chronic pain     all over    Depression     Diabetes ([facility] [address]     GERD (gastroesophageal reflux disease)     hiatal hernia    Hepatitis C     Hypertension     LBP (low back pain)     Lipoma 9/3/2013    Liver disease     CHRONIC HEP C - stage 3 fibrosis    MS (multiple sclerosis) ([facility] [address]     Other ill-defined conditions(799.89)     MS    Other ill-defined conditions(799.89)     HX DRUG ABUSE. CLEAN FOR 21 YRS.  Psychiatric disorder     DEPRESSION.  PUD (peptic ulcer disease)     duodenal ulcer    Stroke [facility])     Diagnosed 10/2011 - no deficits now    Urinary incontinence          ROS:  Comprehensive review of systems negative except for as noted above. Objective:       Meds:  Current Outpatient Medications   Medication Sig Dispense Refill    risedronate (ACTONEL) 150 mg tablet Take 1 Tab by mouth every thirty (30) days. 3 Tab 3    omeprazole (PRILOSEC) 20 mg capsule TAKE 1 CAPSULE BY MOUTH  DAILY 25 Cap 2    glipiZIDE (GLUCOTROL) 10 mg tablet TAKE 1 TABLET BY MOUTH TWO  TIMES DAILY 62 Tab 5    lancets misc One Touch Del Plus Lancets.  Use to check BS 2 x a day 100 Each 11    cloNIDine HCl (CATAPRES) 0.1 mg tablet TAKE 1 TABLET BY MOUTH  TWICE A DAY 62 Tab 5    interferon beta-1a (AVONEX) 30 mcg/0.5 mL pnij 0.5 mL by IntraMUSCular route every seven (7) days 4 Pen 3    amLODIPine (NORVASC) 10 mg tablet TAKE 1 TABLET BY MOUTH  EVERY DAY 31 Tab 5    SITagliptin (JANUVIA) 100 mg tablet TAKE 1 TABLET BY MOUTH  EVERY DAY 31 Tab 5    atorvastatin (LIPITOR) 20 mg tablet TAKE 1 TABLET BY MOUTH  NIGHTLY 31 Tab 5    metoprolol succinate (TOPROL-XL) 200 mg XL tablet TAKE 1 TABLET BY MOUTH  EVERY DAY 31 Tab 5    citalopram (CELEXA) 20 mg tablet TAKE 1 TABLET BY MOUTH  DAILY 31 Tab 5    triamcinolone acetonide (KENALOG) 0.1 % topical cream APPLY A THIN LAYER TO  AFFECTED AREA 2 TIMES A DAY 30 g 1    BD ULTRA-FINE MINI PEN NEEDLE 31 gauge x 3/16\" ndle USE TO INJECT 10 UNITS UNDER THE SKIN NIGHTLY 100 Pen Needle 5    OTHER One touch verio glucometer 1 Units 0    OTHER One touch verio test strips, monitor BS 2 x a day 50 Strip 11    calcium citrate-vitamin D3 (CITRACAL WITH VITAMIN D MAXIMUM) tablet Take 1 Tab by mouth daily. 30 Tab 5    flash glucose sensor (FREESTYLE TORSTEN 14 DAY SENSOR) kit 1 Units by Does Not Apply route every fourteen (14) days. 2 Kit 11    flash glucose scanning reader (FREESTYLE TORSTEN 14 DAY READER) misc 1 Units by Does Not Apply route daily. 1 Each 0    OTHER lantus solostar insulin U-100. 20 units at bedtime.  ammonium lactate (LAC-HYDRIN) 12 % lotion Apply  to affected area two (2) times a day. rub in to affected area well      cinnamon bark (CINNAMON PO) Take 1,000 mg by mouth daily as needed (takes for  or greater).  SCHISANDRA PO Take  by mouth. schisandra extract 500 mg po once daily.  melatonin 3 mg tablet Take 3 mg by mouth nightly as needed.  Blood-Glucose Meter monitoring kit Use to check BS 2 x a day 1 Kit 0    aspirin 81 mg chewable tablet Take 81 mg by mouth daily.  ascorbic acid, vitamin C, (VITAMIN C) 500 mg tablet Take 500 mg by mouth daily.          Exam:  Visit Vitals  /74   Pulse 80   Resp 18   Wt 73 kg (161 lb)   SpO2 98%   BMI 28.52 kg/m²     NEUROLOGICAL EXAM:  General: Awake, alert, speech fluent  CN: PERRL, EOMI without nystagmus, VFF to confrontation, facial sensation and strength are normal and symmetric, hearing is intact to finger rub bilaterally, palate and tongue movements are intact and symmetric. Motor: Normal tone, bulk and strength bilaterally. Reflexes: 1/4 and symmetric except for 3+ b/l patella, plantar stimulation is flexor. Coordination: FNF, ALONDRA, HTS intact. Sensation: LT intact throughout except for distal LLE  Gait: Normal-based and steady. LABS  Results for orders placed or performed in visit on 02/25/19   AMB POC HEMOGLOBIN A1C   Result Value Ref Range    Hemoglobin A1c (POC) 9.1 %       IMAGING:  MRI Results (most recent):  Results from Hospital Encounter encounter on 10/18/17   MRI BRAIN W WO CONT    Narrative INDICATION: MS multiple sclerosis G 35. COMPARISON: MRI 9/18/2014    EXAM: Sagittal T1-weighted spin-echo, axial and sagittal FLAIR and T2-weighted  fast spin-echo, axial diffusion weighted echo planar, axial T1-weighted  spin-echo, axial gradient echo, and post IV contrast-enhanced axial and coronal  T1-weighted spin-echo MR images of the brain are obtained. A total of 15 cc  intravenous ProHance was administered for the study. The study was performed on  an open configuration 0.7 Jolene (intermediate) field strength MR imaging system. FINDINGS: Abundant foci of white matter signal alteration appear similar in  distribution and extent as that shown previously. A few tiny foci of hemosiderin  deposition are again noted in the thalami bilaterally and the right temporal  lobe. There remains no evidence for intra-axial enhancement abnormality; there  are 2 areas of artifactual enhancement in the inferior left medulla and superior  right medulla which related to flow artifact.) the ventricles and cortical sulci  remain normal in size and contour. The vascular flow voids at the base the brain  are normal in conspicuity. Sella, optic chiasm, orbits and paranasal sinuses  remain normal in appearance. Impression IMPRESSION: Extensive intra-axial signal alterations are stable in appearance in  keeping with patient's diagnosis of multiple sclerosis.  No enhancing lesion is  demonstrated. Assessment:     Encounter Diagnoses     ICD-10-CM ICD-9-CM   1. MS (multiple sclerosis) ([facility] [address] G35 340   2. Muscle cramp R25.2 729.82   3. Transaminitis R74.0 790.4      Pleasant 81 y.o. RHAAF h/o DM, HTN, HCV, depression and remote stroke here for f/u of MS dx 1999 (prior optic neuritis b/l, L paresthesias) on Avonex. Clinically, she appears stable without significant focal deficits. Denies recent exacerbations. Prior MRI Brain from 2014 independently reviewed and c/w MS with moderate periventricular T2 hyperintensities. No evidence of cervical lesions on MRI C spine. No evidence of radiographic progression of disease activity on Avonex on review of MRI Brain 10/2017. Will obtain updated imaging to evaluate for efficacy of multiple sclerosis disease modifying therapy. Disease activity in MS is often not immediately detectable on history or examination, but is sensitively identified on MRI. If identified, new or active MS lesions on MRI may represent suboptimal response to MS therapy and would change medical management. We reviewed the multiple sclerosis diagnosis, prognosis, and implications. We reviewed the 3-pronged treatment strategy: treating acute flares, using preventative treatments to prevent future relapses and brain lesions, and treating residual symptoms. For long-term treatment, I recommend continuation of Avonex. Will check labs today to assess liver function as she reports some recent abnormalities that have corrected on repeat testing per patient. She is interested in alternative therapy for MS that does not include daily or weekly injections. We explored oral DMTs including Vumerity due to her intolerance to Tecfidera in the past from GI side effects. We will need to check her liver function first due to potential for hepatotoxicity. Plan:   Repeat MRI Brain WWO  CBC, CMP  Cont.  Vit D supplementation    Follow-up and Dispositions · Return in about 3 months (around 3/18/2020).            Signed:  Manda Alex,   12/18/2019

## 2019-12-19 LAB
ALBUMIN SERPL-MCNC: 4.3 G/DL (ref 3.6–4.8)
ALBUMIN/GLOB SERPL: 1.5 {RATIO} (ref 1.2–2.2)
ALP SERPL-CCNC: 106 IU/L (ref 39–117)
ALT SERPL-CCNC: 17 IU/L (ref 0–32)
AST SERPL-CCNC: 19 IU/L (ref 0–40)
BASOPHILS # BLD AUTO: 0 X10E3/UL (ref 0–0.2)
BASOPHILS NFR BLD AUTO: 1 %
BILIRUB SERPL-MCNC: 0.4 MG/DL (ref 0–1.2)
BUN SERPL-MCNC: 12 MG/DL (ref 8–27)
BUN/CREAT SERPL: 13 (ref 12–28)
CALCIUM SERPL-MCNC: 9.6 MG/DL (ref 8.7–10.3)
CHLORIDE SERPL-SCNC: 98 MMOL/L (ref 96–106)
CO2 SERPL-SCNC: 27 MMOL/L (ref 20–29)
CREAT SERPL-MCNC: 0.91 MG/DL (ref 0.57–1)
EOSINOPHIL # BLD AUTO: 0.2 X10E3/UL (ref 0–0.4)
EOSINOPHIL NFR BLD AUTO: 3 %
ERYTHROCYTE [DISTWIDTH] IN BLOOD BY AUTOMATED COUNT: 12.6 % (ref 12.3–15.4)
GLOBULIN SER CALC-MCNC: 2.9 G/DL (ref 1.5–4.5)
GLUCOSE SERPL-MCNC: 172 MG/DL (ref 65–99)
HCT VFR BLD AUTO: 42.9 % (ref 34–46.6)
HGB BLD-MCNC: 13.4 G/DL (ref 11.1–15.9)
IMM GRANULOCYTES # BLD AUTO: 0 X10E3/UL (ref 0–0.1)
IMM GRANULOCYTES NFR BLD AUTO: 1 %
LYMPHOCYTES # BLD AUTO: 2.2 X10E3/UL (ref 0.7–3.1)
LYMPHOCYTES NFR BLD AUTO: 39 %
MCH RBC QN AUTO: 25.1 PG (ref 26.6–33)
MCHC RBC AUTO-ENTMCNC: 31.2 G/DL (ref 31.5–35.7)
MCV RBC AUTO: 81 FL (ref 79–97)
MONOCYTES # BLD AUTO: 0.7 X10E3/UL (ref 0.1–0.9)
MONOCYTES NFR BLD AUTO: 12 %
NEUTROPHILS # BLD AUTO: 2.6 X10E3/UL (ref 1.4–7)
NEUTROPHILS NFR BLD AUTO: 44 %
PLATELET # BLD AUTO: 296 X10E3/UL (ref 150–450)
POTASSIUM SERPL-SCNC: 4.4 MMOL/L (ref 3.5–5.2)
PROT SERPL-MCNC: 7.2 G/DL (ref 6–8.5)
RBC # BLD AUTO: 5.33 X10E6/UL (ref 3.77–5.28)
SODIUM SERPL-SCNC: 141 MMOL/L (ref 134–144)
WBC # BLD AUTO: 5.7 X10E3/UL (ref 3.4–10.8)

## 2019-12-29 ENCOUNTER — HOSPITAL ENCOUNTER (OUTPATIENT)
Dept: MRI IMAGING | Age: 69
Discharge: HOME | End: 2019-12-29
Attending: PSYCHIATRY & NEUROLOGY
Payer: MEDICAID

## 2019-12-29 DIAGNOSIS — G35 MS (MULTIPLE SCLEROSIS) (HCC): ICD-10-CM

## 2019-12-29 PROCEDURE — 74011250636 HC RX REV CODE- 250/636: Performed by: PSYCHIATRY & NEUROLOGY

## 2019-12-29 PROCEDURE — 70553 MRI BRAIN STEM W/O & W/DYE: CPT

## 2019-12-29 PROCEDURE — A9575 INJ GADOTERATE MEGLUMI 0.1ML: HCPCS | Performed by: PSYCHIATRY & NEUROLOGY

## 2019-12-29 RX ORDER — GADOTERATE MEGLUMINE 376.9 MG/ML
15 INJECTION INTRAVENOUS
Status: COMPLETED | OUTPATIENT
Start: 2019-12-29 | End: 2019-12-29

## 2019-12-29 RX ADMIN — GADOTERATE MEGLUMINE 15 ML: 376.9 INJECTION INTRAVENOUS at 15:00

## 2019-12-31 ENCOUNTER — TELEPHONE (OUTPATIENT)
Dept: NEUROLOGY | Age: 69
End: 2019-12-31

## 2020-01-02 NOTE — PROGRESS NOTES
Reviewed imaging results with patient via telephone. Due to radiographic disease progression on current DMTs, will transition to Vumerity.   THOMAS

## 2020-01-03 ENCOUNTER — TELEPHONE (OUTPATIENT)
Dept: NEUROLOGY | Age: 70
End: 2020-01-03

## 2020-01-03 NOTE — TELEPHONE ENCOUNTER
Spoke with patient, advised her that the start form for Vumerity needs her signature. She will come by the office next week to do so.

## 2020-01-07 DIAGNOSIS — M81.6 LOCALIZED OSTEOPOROSIS, UNSPECIFIED PATHOLOGICAL FRACTURE PRESENCE: ICD-10-CM

## 2020-01-07 NOTE — TELEPHONE ENCOUNTER
Requested Prescriptions     Pending Prescriptions Disp Refills    atorvastatin (LIPITOR) 20 mg tablet 31 Tab 5    omeprazole (PRILOSEC) 20 mg capsule 25 Cap 2   08/30/2019  No upcoming appointments  [facility]

## 2020-01-09 ENCOUNTER — TELEPHONE (OUTPATIENT)
Dept: NEUROLOGY | Age: 70
End: 2020-01-09

## 2020-01-09 RX ORDER — OMEPRAZOLE 20 MG/1
CAPSULE, DELAYED RELEASE ORAL
Qty: 30 CAP | Refills: 5 | Status: SHIPPED | OUTPATIENT
Start: 2020-01-09 | End: 2020-02-17 | Stop reason: SDUPTHER

## 2020-01-09 RX ORDER — RISEDRONATE SODIUM 150 MG/1
150 TABLET, FILM COATED ORAL
Qty: 3 TAB | Refills: 3 | Status: SHIPPED | OUTPATIENT
Start: 2020-01-09 | End: 2020-09-15 | Stop reason: ALTCHOICE

## 2020-01-09 RX ORDER — ATORVASTATIN CALCIUM 20 MG/1
TABLET, FILM COATED ORAL
Qty: 31 TAB | Refills: 5 | Status: SHIPPED | OUTPATIENT
Start: 2020-01-09 | End: 2020-09-09

## 2020-01-09 NOTE — TELEPHONE ENCOUNTER
Re: Homa Norberts    Denial letter rec'd from [facility]. This medicine is given if you have tried TWO preferred medicines first like:    AVONEX/AVONEX ADM PACK, MAYZENT STARTER SOY/MAYZENT TABLET,  GILENYA (available with approval only), REBIF DOSE PEN/REBIF SQ  (SUBCUTANEOUS), BETASERON, COPAXONE 20 MG SYRINGE. The facts given to us do not show you have tried two preferred medicines first.   Information reviewed shows you have tried AVONEX only. * Please note, an approval has been placed for 1 year until 01/09/2021 for [facility]. *      Copy of denial/appeal information has been scanned into chart for review.

## 2020-01-10 LAB
ALBUMIN/CREAT UR: 488.3 MG/G CREAT (ref 0–30)
ALT SERPL-CCNC: 24 IU/L (ref 0–32)
AST SERPL-CCNC: 19 IU/L (ref 0–40)
BUN SERPL-MCNC: 11 MG/DL (ref 8–27)
BUN/CREAT SERPL: 14 (ref 12–28)
CALCIUM SERPL-MCNC: 10.3 MG/DL (ref 8.7–10.3)
CHLORIDE SERPL-SCNC: 99 MMOL/L (ref 96–106)
CHOLEST SERPL-MCNC: 163 MG/DL (ref 100–199)
CO2 SERPL-SCNC: 27 MMOL/L (ref 20–29)
CREAT SERPL-MCNC: 0.8 MG/DL (ref 0.57–1)
CREAT UR-MCNC: 165.8 MG/DL
EST. AVERAGE GLUCOSE BLD GHB EST-MCNC: 214 MG/DL
GLUCOSE SERPL-MCNC: 147 MG/DL (ref 65–99)
HBA1C MFR BLD: 9.1 % (ref 4.8–5.6)
HDLC SERPL-MCNC: 55 MG/DL
INTERPRETATION, 910389: NORMAL
LDLC SERPL CALC-MCNC: 85 MG/DL (ref 0–99)
Lab: NORMAL
MICROALBUMIN UR-MCNC: 809.6 UG/ML
POTASSIUM SERPL-SCNC: 4.3 MMOL/L (ref 3.5–5.2)
SODIUM SERPL-SCNC: 140 MMOL/L (ref 134–144)
TRIGL SERPL-MCNC: 117 MG/DL (ref 0–149)
VLDLC SERPL CALC-MCNC: 23 MG/DL (ref 5–40)

## 2020-01-22 ENCOUNTER — TELEPHONE (OUTPATIENT)
Dept: NEUROLOGY | Age: 70
End: 2020-01-22

## 2020-01-23 NOTE — TELEPHONE ENCOUNTER
Re: Harry Montaño Approved    Called [facility] to check on status of appeal.    Outcome has been overturned and case is now closed. Case #[id number]  Approved 01/17/20-01/17-21    Per [facility], an approval letter has been sent in the mail to our office.

## 2020-01-28 ENCOUNTER — TELEPHONE (OUTPATIENT)
Dept: NEUROLOGY | Age: 70
End: 2020-01-28

## 2020-01-28 NOTE — PROGRESS NOTES
Called and verified pt with name and . Informed pt that per MD she needs to come into the office to discuss her lab results as her DM is out-of-control. Pt stated she missed her last visit due to being ill. Pt is scheduled with MD on Friday, 2020 @ 3839. Pt verbalized understanding with no further questions at this time.

## 2020-01-28 NOTE — TELEPHONE ENCOUNTER
----- Message from Iveth Bennett sent at 1/28/2020  4:15 PM EST -----  Regarding: Dr Soriano/rx refill  Medication Refill new medication    Caller (if not patient):      Relationship of caller (if not patient):      Best contact number(s):[phone]      Name of medication and dosage if known:Vumerity 231 mg cap delayed release      Is patient out of this medication (yes/no):yes      Pharmacy name: [address] [place] [address] listed in chart? (yes/no):  Pharmacy phone [phone]      Details to clarify the request:      Iveth Bennett

## 2020-01-28 NOTE — TELEPHONE ENCOUNTER
----- Message from [facility] sent at 1/28/2020 10:30 AM EST -----  Regarding: Dr. Beth Gomez from [email or web address] would like a call back regarding pt's Vumerity.   Contact is [phone]

## 2020-01-29 ENCOUNTER — TELEPHONE (OUTPATIENT)
Dept: NEUROLOGY | Age: 70
End: 2020-01-29

## 2020-01-29 NOTE — TELEPHONE ENCOUNTER
Spoke with [employer], checking on status of appeal. Provided approval number and dates. no

## 2020-01-29 NOTE — TELEPHONE ENCOUNTER
----- Message from Tita Minor sent at 1/28/2020  4:15 PM EST -----  Regarding: Dr Soriano/rx refill  Medication Refill new medication    Caller (if not patient):      Relationship of caller (if not patient):      Best contact number(s):[phone]      Name of medication and dosage if known:Vumerity 231 mg cap delayed release      Is patient out of this medication (yes/no):yes      Pharmacy name: [address] [place] [address] listed in chart? (yes/no):  Pharmacy phone [phone]      Details to clarify the request:      Tita Minor

## 2020-01-30 NOTE — TELEPHONE ENCOUNTER
Pt calling to see when she's going to be able to start her new medication. She doesn't want to refill her Avonex because can't afford both. She also said she is supposed to do her injection on Monday so she would like to know what to do before then.  Please call back

## 2020-01-30 NOTE — TELEPHONE ENCOUNTER
Spoke with Cody with [facility]. He wanted to confirm titration dose of Vumerity. He will call patient now to see if patient will accept medication for delivery tomorrow.

## 2020-02-04 ENCOUNTER — TELEPHONE (OUTPATIENT)
Dept: NEUROLOGY | Age: 70
End: 2020-02-04

## 2020-02-04 NOTE — TELEPHONE ENCOUNTER
Spoke with patient, she states she is on baby aspirin but wants to go on adult aspirin. Her PCP told her to go on baby aspirin. I advised her to seek advice from the original prescriber.

## 2020-02-04 NOTE — TELEPHONE ENCOUNTER
----- Message from Agus Suarez sent at 2/4/2020  3:52 PM EST -----  Regarding: Dr. Norman Began first and last name:      Reason for call: Questions about new medication Rx \"Vumerity 231 mg\"      Callback required yes/no and why: yes      Best contact number(s): [phone]      Details to clarify the request:      Agus Suarez

## 2020-02-04 NOTE — TELEPHONE ENCOUNTER
----- Message from Annika Leos sent at 2/4/2020  3:52 PM EST -----  Regarding: Dr. Noah Vences first and last name:      Reason for call: Questions about new medication Rx \"Vumerity 231 mg\"      Callback required yes/no and why: yes      Best contact number(s): [phone]      Details to clarify the request:      Annika Leos

## 2020-02-11 ENCOUNTER — TELEPHONE (OUTPATIENT)
Dept: NEUROLOGY | Age: 70
End: 2020-02-11

## 2020-02-11 NOTE — TELEPHONE ENCOUNTER
----- Message from Michelle Preez sent at 2/11/2020 11:29 AM EST -----  Regarding: dr villanueva/ carlene  General Message/Vendor Calls    Caller's first and last name: pt      Reason for call: wants to know should she up the dose of her 81mg horace aspirin      Callback required yes/no and why: yes      Best contact number(s): [phone]      Details to clarify the request:      Jason Amin

## 2020-02-12 ENCOUNTER — TELEPHONE (OUTPATIENT)
Dept: NEUROLOGY | Age: 70
End: 2020-02-12

## 2020-02-12 NOTE — TELEPHONE ENCOUNTER
Spoke with patient, informed her per -I would continue at current dosing if she is tolerating this ok and no recent symptoms pertaining to focal neuro deficits/stroke or other ischemia. She verbalized understanding.

## 2020-02-17 ENCOUNTER — OFFICE VISIT (OUTPATIENT)
Dept: INTERNAL MEDICINE CLINIC | Age: 70
End: 2020-02-17

## 2020-02-17 VITALS
DIASTOLIC BLOOD PRESSURE: 74 MMHG | SYSTOLIC BLOOD PRESSURE: 130 MMHG | HEART RATE: 84 BPM | TEMPERATURE: 98 F | OXYGEN SATURATION: 99 % | RESPIRATION RATE: 20 BRPM | HEIGHT: 63 IN | WEIGHT: 168 LBS | BODY MASS INDEX: 29.77 KG/M2

## 2020-02-17 DIAGNOSIS — M06.9 RHEUMATOID ARTHRITIS, INVOLVING UNSPECIFIED SITE, UNSPECIFIED RHEUMATOID FACTOR PRESENCE: ICD-10-CM

## 2020-02-17 DIAGNOSIS — G35 MS (MULTIPLE SCLEROSIS) (HCC): ICD-10-CM

## 2020-02-17 DIAGNOSIS — I10 ESSENTIAL HYPERTENSION: ICD-10-CM

## 2020-02-17 DIAGNOSIS — F33.42 RECURRENT MAJOR DEPRESSIVE DISORDER, IN FULL REMISSION (HCC): ICD-10-CM

## 2020-02-17 DIAGNOSIS — E11.21 TYPE 2 DIABETES MELLITUS WITH DIABETIC NEPHROPATHY, WITH LONG-TERM CURRENT USE OF INSULIN (HCC): Primary | ICD-10-CM

## 2020-02-17 DIAGNOSIS — Z79.4 TYPE 2 DIABETES MELLITUS WITH DIABETIC NEPHROPATHY, WITH LONG-TERM CURRENT USE OF INSULIN (HCC): Primary | ICD-10-CM

## 2020-02-17 DIAGNOSIS — K21.9 GASTROESOPHAGEAL REFLUX DISEASE WITHOUT ESOPHAGITIS: ICD-10-CM

## 2020-02-17 DIAGNOSIS — B18.2 HEP C W/O COMA, CHRONIC (HCC): ICD-10-CM

## 2020-02-17 RX ORDER — INSULIN GLARGINE 100 [IU]/ML
INJECTION, SOLUTION SUBCUTANEOUS
Qty: 3 PEN | Refills: 3 | Status: SHIPPED | OUTPATIENT
Start: 2020-02-17 | End: 2021-04-12

## 2020-02-17 RX ORDER — METOPROLOL SUCCINATE 200 MG/1
TABLET, EXTENDED RELEASE ORAL
Qty: 90 TAB | Refills: 1 | Status: SHIPPED | OUTPATIENT
Start: 2020-02-17 | End: 2020-06-15

## 2020-02-17 RX ORDER — AMMONIUM LACTATE 12 G/100G
LOTION TOPICAL
Qty: 3 BOTTLE | Refills: 1 | Status: SHIPPED | OUTPATIENT
Start: 2020-02-17 | End: 2020-12-18 | Stop reason: SDUPTHER

## 2020-02-17 RX ORDER — DIROXIMEL FUMARATE 231 MG/1
CAPSULE ORAL
COMMUNITY
Start: 2020-01-29 | End: 2021-01-19 | Stop reason: SDUPTHER

## 2020-02-17 RX ORDER — CLONIDINE HYDROCHLORIDE 0.1 MG/1
TABLET ORAL
Qty: 180 TAB | Refills: 1 | Status: SHIPPED | OUTPATIENT
Start: 2020-02-17 | End: 2020-06-15

## 2020-02-17 RX ORDER — GLIPIZIDE 10 MG/1
TABLET ORAL
Qty: 180 TAB | Refills: 1 | Status: SHIPPED | OUTPATIENT
Start: 2020-02-17 | End: 2020-06-15

## 2020-02-17 RX ORDER — OMEPRAZOLE 20 MG/1
CAPSULE, DELAYED RELEASE ORAL
Qty: 90 CAP | Refills: 1 | Status: SHIPPED | OUTPATIENT
Start: 2020-02-17 | End: 2020-06-15

## 2020-02-17 RX ORDER — AMLODIPINE BESYLATE 10 MG/1
TABLET ORAL
Qty: 90 TAB | Refills: 1 | Status: SHIPPED | OUTPATIENT
Start: 2020-02-17 | End: 2020-07-15

## 2020-02-17 NOTE — PROGRESS NOTES
Health Maintenance Due   Topic Date Due    Eye Exam Retinal or Dilated  07/18/1962    DTaP/Tdap/Td series (1 - Tdap) 07/18/1963    Colonoscopy  07/18/1970    Shingrix Vaccine Age 50> (1 of 2) 07/18/2002    Influenza Age 5 to Adult  08/01/2019       Chief Complaint   Patient presents with    Diabetes    Abnormal Lab Results       1. Have you been to the ER, urgent care clinic since your last visit? Hospitalized since your last visit? No    2. Have you seen or consulted any other health care providers outside of the [address] [place] [address] since your last visit? Include any pap smears or colon screening. No    3) Do you have an Advance Directive on file? no    4) Are you interested in receiving information on Advance Directives? NO      Patient is accompanied by self I have received verbal consent from Sydnie Doss to discuss any/all medical information while they are present in the room.

## 2020-02-19 ENCOUNTER — TELEPHONE (OUTPATIENT)
Dept: NEUROLOGY | Age: 70
End: 2020-02-19

## 2020-02-19 ENCOUNTER — APPOINTMENT (OUTPATIENT)
Dept: GENERAL RADIOLOGY | Age: 70
End: 2020-02-19
Attending: EMERGENCY MEDICINE
Payer: MEDICAID

## 2020-02-19 ENCOUNTER — HOSPITAL ENCOUNTER (EMERGENCY)
Age: 70
Discharge: HOME | End: 2020-02-19
Attending: EMERGENCY MEDICINE | Admitting: EMERGENCY MEDICINE
Payer: MEDICAID

## 2020-02-19 VITALS
HEIGHT: 63 IN | OXYGEN SATURATION: 95 % | DIASTOLIC BLOOD PRESSURE: 86 MMHG | RESPIRATION RATE: 14 BRPM | TEMPERATURE: 97.8 F | BODY MASS INDEX: 29.77 KG/M2 | SYSTOLIC BLOOD PRESSURE: 160 MMHG | WEIGHT: 168 LBS | HEART RATE: 60 BPM

## 2020-02-19 DIAGNOSIS — R07.9 CHEST PAIN, UNSPECIFIED TYPE: Primary | ICD-10-CM

## 2020-02-19 LAB
ALBUMIN SERPL-MCNC: 3.3 G/DL (ref 3.5–5)
ALBUMIN/GLOB SERPL: 0.9 {RATIO} (ref 1.1–2.2)
ALP SERPL-CCNC: 110 U/L (ref 45–117)
ALT SERPL-CCNC: 29 U/L (ref 12–78)
ANION GAP SERPL CALC-SCNC: 2 MMOL/L (ref 5–15)
AST SERPL-CCNC: 14 U/L (ref 15–37)
ATRIAL RATE: 56 BPM
BASOPHILS # BLD: 0 K/UL (ref 0–0.1)
BASOPHILS NFR BLD: 0 % (ref 0–1)
BILIRUB SERPL-MCNC: 0.2 MG/DL (ref 0.2–1)
BUN SERPL-MCNC: 11 MG/DL (ref 6–20)
BUN/CREAT SERPL: 14 (ref 12–20)
CALCIUM SERPL-MCNC: 8.5 MG/DL (ref 8.5–10.1)
CALCULATED P AXIS, ECG09: 42 DEGREES
CALCULATED T AXIS, ECG11: 96 DEGREES
CHLORIDE SERPL-SCNC: 104 MMOL/L (ref 97–108)
CO2 SERPL-SCNC: 30 MMOL/L (ref 21–32)
CREAT SERPL-MCNC: 0.77 MG/DL (ref 0.55–1.02)
DIAGNOSIS, 93000: NORMAL
DIFFERENTIAL METHOD BLD: ABNORMAL
EOSINOPHIL # BLD: 0.1 K/UL (ref 0–0.4)
EOSINOPHIL NFR BLD: 1 % (ref 0–7)
ERYTHROCYTE [DISTWIDTH] IN BLOOD BY AUTOMATED COUNT: 13.6 % (ref 11.5–14.5)
GLOBULIN SER CALC-MCNC: 3.8 G/DL (ref 2–4)
GLUCOSE SERPL-MCNC: 173 MG/DL (ref 65–100)
HCT VFR BLD AUTO: 37.4 % (ref 35–47)
HGB BLD-MCNC: 11.8 G/DL (ref 11.5–16)
IMM GRANULOCYTES # BLD AUTO: 0 K/UL (ref 0–0.04)
IMM GRANULOCYTES NFR BLD AUTO: 1 % (ref 0–0.5)
LYMPHOCYTES # BLD: 1.9 K/UL (ref 0.8–3.5)
LYMPHOCYTES NFR BLD: 31 % (ref 12–49)
MCH RBC QN AUTO: 25.9 PG (ref 26–34)
MCHC RBC AUTO-ENTMCNC: 31.6 G/DL (ref 30–36.5)
MCV RBC AUTO: 82.2 FL (ref 80–99)
MONOCYTES # BLD: 0.8 K/UL (ref 0–1)
MONOCYTES NFR BLD: 13 % (ref 5–13)
NEUTS SEG # BLD: 3.2 K/UL (ref 1.8–8)
NEUTS SEG NFR BLD: 54 % (ref 32–75)
NRBC # BLD: 0 K/UL (ref 0–0.01)
NRBC BLD-RTO: 0 PER 100 WBC
P-R INTERVAL, ECG05: 198 MS
PLATELET # BLD AUTO: 240 K/UL (ref 150–400)
PMV BLD AUTO: 10.5 FL (ref 8.9–12.9)
POTASSIUM SERPL-SCNC: 3.4 MMOL/L (ref 3.5–5.1)
PROT SERPL-MCNC: 7.1 G/DL (ref 6.4–8.2)
Q-T INTERVAL, ECG07: 418 MS
QRS DURATION, ECG06: 78 MS
QTC CALCULATION (BEZET), ECG08: 403 MS
RBC # BLD AUTO: 4.55 M/UL (ref 3.8–5.2)
SODIUM SERPL-SCNC: 136 MMOL/L (ref 136–145)
TROPONIN I SERPL-MCNC: <0.05 NG/ML
TROPONIN I SERPL-MCNC: <0.05 NG/ML
VENTRICULAR RATE, ECG03: 56 BPM
WBC # BLD AUTO: 6 K/UL (ref 3.6–11)

## 2020-02-19 PROCEDURE — 80053 COMPREHEN METABOLIC PANEL: CPT

## 2020-02-19 PROCEDURE — 84484 ASSAY OF TROPONIN QUANT: CPT

## 2020-02-19 PROCEDURE — 85025 COMPLETE CBC W/AUTO DIFF WBC: CPT

## 2020-02-19 PROCEDURE — 71046 X-RAY EXAM CHEST 2 VIEWS: CPT

## 2020-02-19 PROCEDURE — 36415 COLL VENOUS BLD VENIPUNCTURE: CPT

## 2020-02-19 PROCEDURE — 99285 EMERGENCY DEPT VISIT HI MDM: CPT

## 2020-02-19 PROCEDURE — 93005 ELECTROCARDIOGRAM TRACING: CPT

## 2020-02-19 NOTE — ED TRIAGE NOTES
Pt arrives by EMS with reports of chest pain radiating to left jaw that began around 1030 today and lasted approximately 1hour, denies pain at this time

## 2020-02-19 NOTE — TELEPHONE ENCOUNTER
Received fax from [facility]- This communication is to inform you that we have made several attempts to contact the above patient to fill her Vumerity cap 231mg. Spoke with patient, informed her we received this fax. Provided contact information for Terry.

## 2020-02-19 NOTE — ED PROVIDER NOTES
Date of Service:  2/19/2020    Patient:  Jagjit Morejon    Chief Complaint:  Chest pain. HPI:  Jagjit Morejon is a 81 y.o.  female who presents for evaluation of an episode of chest pain that occurred this morning and lasted for ~1 hour. Pt's pain was to her L chest, but radiated to her L neck and jaw, as well as the L side of her abdomen. She rates her pain 8/10 during the episode, but she states the pain has resolved and she is not experiencing any pain currently. Pt notes a hx of chest pain in the past that was shorter in duration, but worse in intensity than today. She reports a hx of heart murmur and arrhythmia, but denies hx of cardiovascular disease, MI, or stent placement. She does have a hx of stroke, and chronic hepatitis C, and she states she was on her way to see her nephrologist when her pain began today. Pt endorses leg swelling, present at baseline. She denies nausea, vomiting, diaphoresis, chills, belching, urinary symptoms, and bowel symptoms. There are no other acute medical concerns at this time. Social hx:    PCP: Jamari Villa DO    Note written by Alvie Spatz, Scribe, as dictated by Vasyl Sanford DO 11:48 AM        The history is provided by the patient. No  was used. Past Medical History:   Diagnosis Date    Arrhythmia     irregular heatbeat/heart murmur - evaluated and ok    Arthritis     OSTEO A & OSTEOPOROSIS     Autoimmune disease ([facility] [address] 1999    MS    Chronic pain     all over    Depression     Diabetes ([address]     GERD (gastroesophageal reflux disease)     hiatal hernia    Hepatitis C     Hypertension     LBP (low back pain)     Lipoma 9/3/2013    Liver disease     CHRONIC HEP C - stage 3 fibrosis    MS (multiple sclerosis) ([address]     Other ill-defined conditions(799.89)     MS    Other ill-defined conditions(799.89)     HX DRUG ABUSE. CLEAN FOR 21 YRS.  Psychiatric disorder     DEPRESSION.     PUD (peptic ulcer disease) duodenal ulcer    Stroke [facility])     Diagnosed 10/2011 - no deficits now    Urinary incontinence        Past Surgical History:   Procedure Laterality Date    HX GI      COLONOSCOPY X 1    HX HYSTERECTOMY      HX LIPOMA RESECTION  9/6/13     Excision of lipoma, left posterior thigh         Family History:   Problem Relation Age of Onset   [facility] Arthritis-rheumatoid Mother     Hypertension Mother     Osteoporosis Mother     Other Mother         curved spine/hump on one side of back    Cancer Father         ? where   [facility] Breast Cancer Maternal Aunt     Breast Cancer Maternal Aunt     Stroke Maternal Grandmother     Stroke Other         maternal great uncle    Heart Disease Maternal Aunt        Social History     Socioeconomic History    Marital status: SINGLE     Spouse name: Not on file    Number of children: Not on file    Years of education: Not on file    Highest education level: Not on file   Occupational History    Not on file   Social Needs    Financial resource strain: Not on file    Food insecurity:     Worry: Not on file     Inability: Not on file    Transportation needs:     Medical: Not on file     Non-medical: Not on file   Tobacco Use    Smoking status: Never Smoker    Smokeless tobacco: Never Used   Substance and Sexual Activity    Alcohol use: No    Drug use: No    Sexual activity: Never   Lifestyle    Physical activity:     Days per week: Not on file     Minutes per session: Not on file    Stress: Not on file   Relationships    Social connections:     Talks on phone: Not on file     Gets together: Not on file     Attends [employer] service: Not on file     Active member of club or organization: Not on file     Attends meetings of clubs or organizations: Not on file     Relationship status: Not on file    Intimate partner violence:     Fear of current or ex partner: Not on file     Emotionally abused: Not on file     Physically abused: Not on file     Forced sexual activity: Not on file   Other Topics Concern    Not on file   Social History Narrative    Not on file         ALLERGIES: Pcn [penicillins]; Fosamax [alendronate]; and Sulfa (sulfonamide antibiotics)    Review of Systems   Constitutional: Negative for chills, diaphoresis and fever. HENT: Negative for hearing loss. Eyes: Negative for visual disturbance. Respiratory: Negative for shortness of breath. Cardiovascular: Positive for chest pain and leg swelling. Gastrointestinal: Positive for abdominal pain. Negative for diarrhea, nausea and vomiting. Genitourinary: Negative for difficulty urinating, dysuria and flank pain. Musculoskeletal: Positive for neck pain. Negative for back pain. Skin: Negative for rash. Neurological: Negative for dizziness and light-headedness. Vitals:    02/19/20 1150   BP: 134/65   Pulse: (!) 56   Resp: 16   Temp: 97.8 °F (36.6 °C)   SpO2: 98%   Weight: 76.2 kg (168 lb)   Height: 5' 3\" (1.6 m)            Physical Exam  Vitals signs and nursing note reviewed. Constitutional:       General: She is not in acute distress. Appearance: She is well-developed. HENT:      Head: Normocephalic and atraumatic. Eyes:      General: No scleral icterus. Conjunctiva/sclera: Conjunctivae normal.      Pupils: Pupils are equal, round, and reactive to light. Neck:      Musculoskeletal: Neck supple. Vascular: No JVD. Trachea: No tracheal deviation. Cardiovascular:      Rate and Rhythm: Regular rhythm. Bradycardia present. Heart sounds: Normal heart sounds. No gallop. Pulmonary:      Effort: Pulmonary effort is normal. No respiratory distress. Breath sounds: Normal breath sounds. Abdominal:      General: Bowel sounds are normal. There is no distension. Palpations: Abdomen is soft. Tenderness: There is no abdominal tenderness. Musculoskeletal: Normal range of motion. General: No tenderness or deformity.       Right lower leg: Edema (trace) present. Left lower leg: Edema (trace) present. Skin:     General: Skin is warm and dry. Capillary Refill: Capillary refill takes less than 2 seconds. Findings: No rash. Neurological:      Mental Status: She is alert and oriented to person, place, and time. Psychiatric:         Behavior: Behavior normal.         Thought Content:  Thought content normal.         Judgment: Judgment normal.      Note written by Susan Webb, as dictated by Mary Anne Stark DO 11:55 AM    MDM  Number of Diagnoses or Management Options  Chest pain, unspecified type:         VITAL SIGNS:  Patient Vitals for the past 4 hrs:   Temp Pulse Resp BP SpO2   02/19/20 1445  60 14 160/86 95 %   02/19/20 1400  (!) 56 17 163/76 98 %   02/19/20 1150 97.8 °F (36.6 °C) (!) 56 16 134/65 98 %         LABS:  Recent Results (from the past 6 hour(s))   EKG, 12 LEAD, INITIAL    Collection Time: 02/19/20 11:45 AM   Result Value Ref Range    Ventricular Rate 56 BPM    Atrial Rate 56 BPM    P-R Interval 198 ms    QRS Duration 78 ms    Q-T Interval 418 ms    QTC Calculation (Bezet) 403 ms    Calculated P Axis 42 degrees    Calculated T Axis 96 degrees    Diagnosis       Sinus bradycardia  Nonspecific T wave abnormality  When compared with ECG of 28-DEC-2017 22:47,  QT has shortened  Confirmed by Kendra Al M.D., [facility] ([id number]) on 2/19/2020 12:16:46 PM     CBC WITH AUTOMATED DIFF    Collection Time: 02/19/20 11:56 AM   Result Value Ref Range    WBC 6.0 3.6 - 11.0 K/uL    RBC 4.55 3.80 - 5.20 M/uL    HGB 11.8 11.5 - 16.0 g/dL    HCT 37.4 35.0 - 47.0 %    MCV 82.2 80.0 - 99.0 FL    MCH 25.9 (L) 26.0 - 34.0 PG    MCHC 31.6 30.0 - 36.5 g/dL    RDW 13.6 11.5 - 14.5 %    PLATELET 205 198 - 593 K/uL    MPV 10.5 8.9 - 12.9 FL    NRBC 0.0 0  WBC    ABSOLUTE NRBC 0.00 0.00 - 0.01 K/uL    NEUTROPHILS 54 32 - 75 %    LYMPHOCYTES 31 12 - 49 %    MONOCYTES 13 5 - 13 %    EOSINOPHILS 1 0 - 7 %    BASOPHILS 0 0 - 1 %    IMMATURE GRANULOCYTES 1 (H) 0.0 - 0.5 %    ABS. NEUTROPHILS 3.2 1.8 - 8.0 K/UL    ABS. LYMPHOCYTES 1.9 0.8 - 3.5 K/UL    ABS. MONOCYTES 0.8 0.0 - 1.0 K/UL    ABS. EOSINOPHILS 0.1 0.0 - 0.4 K/UL    ABS. BASOPHILS 0.0 0.0 - 0.1 K/UL    ABS. IMM. GRANS. 0.0 0.00 - 0.04 K/UL    DF AUTOMATED     METABOLIC PANEL, COMPREHENSIVE    Collection Time: 02/19/20 11:56 AM   Result Value Ref Range    Sodium 136 136 - 145 mmol/L    Potassium 3.4 (L) 3.5 - 5.1 mmol/L    Chloride 104 97 - 108 mmol/L    CO2 30 21 - 32 mmol/L    Anion gap 2 (L) 5 - 15 mmol/L    Glucose 173 (H) 65 - 100 mg/dL    BUN 11 6 - 20 MG/DL    Creatinine 0.77 0.55 - 1.02 MG/DL    BUN/Creatinine ratio 14 12 - 20      GFR est AA >60 >60 ml/min/1.73m2    GFR est non-AA >60 >60 ml/min/1.73m2    Calcium 8.5 8.5 - 10.1 MG/DL    Bilirubin, total 0.2 0.2 - 1.0 MG/DL    ALT (SGPT) 29 12 - 78 U/L    AST (SGOT) 14 (L) 15 - 37 U/L    Alk. phosphatase 110 45 - 117 U/L    Protein, total 7.1 6.4 - 8.2 g/dL    Albumin 3.3 (L) 3.5 - 5.0 g/dL    Globulin 3.8 2.0 - 4.0 g/dL    A-G Ratio 0.9 (L) 1.1 - 2.2     TROPONIN I    Collection Time: 02/19/20 11:56 AM   Result Value Ref Range    Troponin-I, Qt. <0.05 <0.05 ng/mL   TROPONIN I    Collection Time: 02/19/20  2:05 PM   Result Value Ref Range    Troponin-I, Qt. <0.05 <0.05 ng/mL        IMAGING:  XR CHEST PA LAT   Final Result   Impression: No acute process. Medications During Visit:  Medications - No data to display      DECISION MAKING:  Beverly Sr is a 81 y.o. female who comes in as above. Here, patient has no symptoms. Labs and imaging are as above. Patient did not wish to stay in the hospital but did agree to a repeat troponin which was again negative. At this time patient is discharged home. She can follow with her primary care doctor later in the week and return as needed. Disposition she is hemodynamically stable, asymptomatic and agreeable to this plan      IMPRESSION:  1.  Chest pain, unspecified type DISPOSITION:  Discharged      Current Discharge Medication List           Follow-up Information     Follow up With Specialties Details Why Contact Info    Criss Mendoza DO Internal Medicine Schedule an appointment as soon as possible for a visit   [address] [place] [address]  [phone]              The patient is asked to follow-up with their primary care provider in the next several days. They are to call tomorrow for an appointment. The patient is asked to return promptly for any increased concerns or worsening of symptoms. They can return to this emergency department or any other emergency department. Procedures    ED EKG interpretation: 11:45 AM  Rhythm: sinus bradycardia; and regular . Rate (approx.): 56; Axis: normal; Interval: normal; ST/T wave: no acute ischemic changes;      Note written by Susan Del Valle, as dictated by Tabatha Garcia DO 11:53 AM

## 2020-02-20 NOTE — PROGRESS NOTES
HISTORY OF PRESENT ILLNESS  Michelle Maguire is a 81 y.o. female. Pt. comes in for f/u. Has multiple medical problems. BP is stable. Has diabetes with nephropathy. Last A1c was over 9. Followed by different specialists. History of hepatitis C, MS, and rheumatoid arthritis. Continues to have chronic arthritic pains. Her GI issues have been stable. Denies tobacco or alcohol use. Reports compliance with medications and diet. Med list and most recent labs/studies reviewed with pt. Trying to be active physically to control weight. Needs medication refills. Reports no other new c/o. Diabetes   Pertinent negatives include no chest pain, no abdominal pain and no shortness of breath. Abnormal Lab Results   Pertinent negatives include no chest pain, no abdominal pain and no shortness of breath. Review of Systems   Constitutional: Negative. HENT: Negative. Eyes: Negative for blurred vision. Respiratory: Negative for shortness of breath. Cardiovascular: Negative for chest pain and leg swelling. Gastrointestinal: Positive for heartburn. Negative for abdominal pain. Genitourinary: Negative for dysuria. Musculoskeletal: Positive for joint pain. Negative for falls. Skin: Negative. Neurological: Positive for tingling and sensory change. Negative for focal weakness. Endo/Heme/Allergies: Negative for polydipsia. Psychiatric/Behavioral: Positive for depression. The patient is nervous/anxious and has insomnia. All other systems reviewed and are negative. Physical Exam  Vitals signs and nursing note reviewed. Constitutional:       General: She is not in acute distress. Appearance: She is well-developed. Comments: overweight   HENT:      Head: Normocephalic and atraumatic. Eyes:      General: No scleral icterus. Conjunctiva/sclera: Conjunctivae normal.      Pupils: Pupils are equal, round, and reactive to light.    Neck:      Musculoskeletal: Normal range of motion and neck supple. Thyroid: No thyromegaly. Vascular: No JVD. Cardiovascular:      Rate and Rhythm: Normal rate and regular rhythm. Heart sounds: Normal heart sounds. No murmur. Pulmonary:      Effort: Pulmonary effort is normal. No respiratory distress. Breath sounds: Normal breath sounds. No wheezing or rales. Abdominal:      General: Bowel sounds are normal. There is no distension. Palpations: Abdomen is soft. Tenderness: There is no abdominal tenderness. Comments: obese   Musculoskeletal:         General: Tenderness (lumbars/knees/hands w/o clear RA changes,,, ) present. Comments: djd   Skin:     General: Skin is warm and dry. Findings: No rash. Neurological:      Mental Status: She is alert and oriented to person, place, and time. Coordination: Coordination normal.   Psychiatric:         Behavior: Behavior normal.      Comments: Chronically depressed           ASSESSMENT and PLAN  Diagnoses and all orders for this visit:    1. Type 2 diabetes mellitus with diabetic nephropathy, with long-term current use of insulin ([address]    2. Essential hypertension    3. Gastroesophageal reflux disease without esophagitis    4. MS (multiple sclerosis) ([facility] [address]    5. Rheumatoid arthritis, involving unspecified site, unspecified rheumatoid factor presence ([address]    6. Hep C w/o coma, chronic (HCC)    7.  Recurrent major depressive disorder, in full remission ([address]    Other orders  -     insulin glargine (LANTUS,BASAGLAR) 100 unit/mL (3 mL) inpn; 25 units at bedtime  -     SITagliptin (JANUVIA) 100 mg tablet; TAKE 1 TABLET BY MOUTH  EVERY DAY  -     glipiZIDE (GLUCOTROL) 10 mg tablet; TAKE 1 TABLET BY MOUTH TWO  TIMES DAILY  -     amLODIPine (NORVASC) 10 mg tablet; TAKE 1 TABLET BY MOUTH  EVERY DAY  -     metoprolol succinate (TOPROL-XL) 200 mg XL tablet; TAKE 1 TABLET BY MOUTH  EVERY DAY  -     cloNIDine HCL (CATAPRES) 0.1 mg tablet; TAKE 1 TABLET BY MOUTH  TWICE A DAY  - omeprazole (PRILOSEC) 20 mg capsule; TAKE 1 CAPSULE BY MOUTH  DAILY  -     ammonium lactate (LAC-HYDRIN) 12 % lotion; rub in to affected area on legs BID      Follow-up and Dispositions    · Return in about 3 months (around 5/17/2020).      lab results and schedule of future lab studies reviewed with patient  reviewed diet, exercise and weight control  reviewed medications and side effects in detail  low cholesterol diet, weight control and daily exercise discussed, home glucose monitoring emphasized, all medications, side effects and compliance discussed carefully, foot care discussed and Podiatry visits discussed, annual eye examinations at Ophthalmology discussed, glycohemoglobin and other lab monitoring discussed and long term diabetic complications discussed  F/u with other MD's as scheduled  Fall precautions discussed  Advised patient to lose weight by watching diet (decreasing sugars/carbs/fat, increasing fruits/vegetables), exercising at least 30 minutes daily, getting 7-8 hours of sleep daily, drinking plenty of water, and decreasing stress  Overall stable

## 2020-02-27 ENCOUNTER — TELEPHONE (OUTPATIENT)
Dept: INTERNAL MEDICINE CLINIC | Age: 70
End: 2020-02-27

## 2020-02-27 NOTE — TELEPHONE ENCOUNTER
Pt states Nephrologist office needs Blood work, 3 office notes, and demographics Faxed to [phone]   Office # [phone]

## 2020-03-17 RX ORDER — CITALOPRAM 20 MG/1
TABLET, FILM COATED ORAL
Qty: 31 TAB | Refills: 5 | Status: SHIPPED | OUTPATIENT
Start: 2020-03-17 | End: 2020-10-26

## 2020-03-17 NOTE — TELEPHONE ENCOUNTER
Requested Prescriptions     Pending Prescriptions Disp Refills    citalopram (CELEXA) 20 mg tablet 31 Tab 5     02/17/2020 05/19/2020  [facility]

## 2020-05-19 ENCOUNTER — VIRTUAL VISIT (OUTPATIENT)
Dept: INTERNAL MEDICINE CLINIC | Age: 70
End: 2020-05-19

## 2020-05-19 VITALS — WEIGHT: 168 LBS | HEIGHT: 63 IN | BODY MASS INDEX: 29.77 KG/M2

## 2020-05-19 DIAGNOSIS — G35 MS (MULTIPLE SCLEROSIS) (HCC): ICD-10-CM

## 2020-05-19 DIAGNOSIS — Z79.4 TYPE 2 DIABETES MELLITUS WITH DIABETIC NEPHROPATHY, WITH LONG-TERM CURRENT USE OF INSULIN (HCC): Primary | ICD-10-CM

## 2020-05-19 DIAGNOSIS — B18.2 HEP C W/O COMA, CHRONIC (HCC): ICD-10-CM

## 2020-05-19 DIAGNOSIS — E11.21 TYPE 2 DIABETES MELLITUS WITH DIABETIC NEPHROPATHY, WITH LONG-TERM CURRENT USE OF INSULIN (HCC): Primary | ICD-10-CM

## 2020-05-19 DIAGNOSIS — I10 ESSENTIAL HYPERTENSION: ICD-10-CM

## 2020-05-19 DIAGNOSIS — M06.9 RHEUMATOID ARTHRITIS, INVOLVING UNSPECIFIED SITE, UNSPECIFIED RHEUMATOID FACTOR PRESENCE: ICD-10-CM

## 2020-05-19 DIAGNOSIS — F33.42 RECURRENT MAJOR DEPRESSIVE DISORDER, IN FULL REMISSION (HCC): ICD-10-CM
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
## 2020-05-19 NOTE — PROGRESS NOTES
Health Maintenance Due   Topic Date Due    Eye Exam Retinal or Dilated  07/18/1962    Colonoscopy  07/18/1970    DTaP/Tdap/Td series (1 - Tdap) 07/18/1973    Shingrix Vaccine Age 50> (1 of 2) 07/18/2002    A1C test (Diabetic or Prediabetic)  04/09/2020       Chief Complaint   Patient presents with    Hypertension    Hepatitis C    Arthritis     states generalized stiffness, mainly in upper right arm    Multiple Sclerosis       1. Have you been to the ER, urgent care clinic since your last visit? Hospitalized since your last visit? No    2. Have you seen or consulted any other health care providers outside of the [address] [place] [address] since your last visit? Include any pap smears or colon screening. No    3) Do you have an Advance Directive on file? no    4) Are you interested in receiving information on Advance Directives? NO      Patient is accompanied by self I have received verbal consent from Brian Harper to discuss any/all medical information while they are present in the room.

## 2020-06-15 RX ORDER — METOPROLOL SUCCINATE 200 MG/1
TABLET, EXTENDED RELEASE ORAL
Qty: 90 TAB | Refills: 1 | Status: SHIPPED | OUTPATIENT
Start: 2020-06-15 | End: 2020-11-23

## 2020-06-15 RX ORDER — OMEPRAZOLE 20 MG/1
CAPSULE, DELAYED RELEASE ORAL
Qty: 90 CAP | Refills: 1 | Status: SHIPPED | OUTPATIENT
Start: 2020-06-15 | End: 2020-11-23

## 2020-06-15 RX ORDER — CLONIDINE HYDROCHLORIDE 0.1 MG/1
TABLET ORAL
Qty: 180 TAB | Refills: 1 | Status: SHIPPED | OUTPATIENT
Start: 2020-06-15 | End: 2020-11-23

## 2020-06-15 RX ORDER — GLIPIZIDE 10 MG/1
TABLET ORAL
Qty: 180 TAB | Refills: 1 | Status: SHIPPED | OUTPATIENT
Start: 2020-06-15 | End: 2021-08-23

## 2020-06-22 ENCOUNTER — VIRTUAL VISIT (OUTPATIENT)
Dept: NEUROLOGY | Age: 70
End: 2020-06-22

## 2020-06-22 DIAGNOSIS — B18.2 HEP C W/O COMA, CHRONIC (HCC): ICD-10-CM

## 2020-06-22 DIAGNOSIS — G35 MS (MULTIPLE SCLEROSIS) (HCC): Primary | ICD-10-CM

## 2020-06-22 NOTE — PROGRESS NOTES
Neurology Clinic Follow up Note    Patient ID:  Danni Kelly  [id number]  28 y.o.  1950      Ms. Chata Lowe is here for follow up today of MS     This is a telemedicine visit that was performed with the originating site at [facility] and the distant site at patient's home. Verbal consent to participate in tele-visit was obtained. The patient was identified by name and date of birth. This visit occurred during the Coronavirus [phone]) [facility] Emergency. I discussed with the patient the nature of our telemedicine visits, that:     I would evaluate the patient and recommend diagnostics and treatments based on my assessment   Our sessions are not being recorded and that personal health information is protected   Our team would provide follow up care in person if/when the patient needs it    Last Appointment With Me:  12/18/2019    Interval History:   Danni Kelly is a 81 y.o. right handed female with a PMH including DM, HTN, HCV, remote stroke presenting for f/u of MS. Date of onset: 1997-experienced knee buckling with falls, paresthesias L side later progressing to both feet, +fatigue, +optic neuritis b/l  Dx made via MRI and LP. Date of diagnosis: 1999    Disease course from Onset: RRMS    Disease course last year: RRMS, stable    Last imaging: MRI Brain [facility] 12/29/19  Moderately extensive white matter disease, c/w multiple sclerosis, possible slight increase in lesions of the corpus callosum. No enhancing lesions. Medications for MS Used in the Past:   Vumerity (current since 2/2020)-tolerating medication well. Avonex- doesn't like injections due to injection site tenderness  Tecfidera- could not swallow pills, +nausea, flushing      Interval history:  Patient reports some cramping into the distal feet on occasion, otherwise is doing well. She endorses rather chronic LBP. She is experiencing some stiffness/pain into the RUE>LUE similar to her arthritis pain.   She is attempting to get in with rheumatology to discuss this further. Denies new numbness/paresthesia, vision loss, focal weakness. Walking is stable, no significant falls. She denies cognitive decline/impairment. Headaches are improved, occurring less frequently over the R hemisphere. No accompanying nausea or photophobia. She was prescribed Nortriptyline previously but did not start this as she lost her prescription. Compliant with DMT/Vumerity. Sleep: impaired due to nocturia    Fatigue: severe fatigue    Memory/Concentration:  Denies current issues    Bladder: frequent urination, urinary urgency    Bowel: constipation improved    Depression: +Anxiety, mild depression on medication. Pain: as described above    PMHx/ PSHx/ FHx/ SHx:  Reviewed and unchanged previous visit. Past Medical History:   Diagnosis Date    Arrhythmia     irregular heatbeat/heart murmur - evaluated and ok    Arthritis     OSTEO A & OSTEOPOROSIS     Autoimmune disease ([facility] 1999    MS    Chronic pain     all over    Depression     Diabetes ([facility]     GERD (gastroesophageal reflux disease)     hiatal hernia    Hepatitis C     Hypertension     LBP (low back pain)     Lipoma 9/3/2013    Liver disease     CHRONIC HEP C - stage 3 fibrosis    MS (multiple sclerosis) ([facility]     Other ill-defined conditions(799.89)     MS    Other ill-defined conditions(799.89)     HX DRUG ABUSE. CLEAN FOR 21 YRS.  Psychiatric disorder     DEPRESSION.  PUD (peptic ulcer disease)     duodenal ulcer    Stroke [facility])     Diagnosed 10/2011 - no deficits now    Urinary incontinence          ROS:  Comprehensive review of systems negative except for as noted above.        Objective:       Meds:  Current Outpatient Medications   Medication Sig Dispense Refill    metoprolol succinate (TOPROL-XL) 200 mg XL tablet TAKE 1 TABLET BY MOUTH  EVERY DAY 90 Tab 1    omeprazole (PRILOSEC) 20 mg capsule TAKE 1 CAPSULE BY MOUTH  DAILY 90 Cap 1    glipiZIDE (GLUCOTROL) 10 mg tablet TAKE 1 TABLET BY MOUTH TWO  TIMES DAILY 180 Tab 1    cloNIDine HCL (CATAPRES) 0.1 mg tablet TAKE 1 TABLET BY MOUTH  TWICE A  Tab 1    citalopram (CELEXA) 20 mg tablet TAKE 1 TABLET BY MOUTH  DAILY 31 Tab 5    VUMERITY 231 mg cpDR       insulin glargine (LANTUS,BASAGLAR) 100 unit/mL (3 mL) inpn 25 units at bedtime 3 Pen 3    SITagliptin (JANUVIA) 100 mg tablet TAKE 1 TABLET BY MOUTH  EVERY DAY 90 Tab 1    amLODIPine (NORVASC) 10 mg tablet TAKE 1 TABLET BY MOUTH  EVERY DAY 90 Tab 1    ammonium lactate (LAC-HYDRIN) 12 % lotion rub in to affected area on legs BID 3 Bottle 1    atorvastatin (LIPITOR) 20 mg tablet TAKE 1 TABLET BY MOUTH  NIGHTLY 31 Tab 5    risedronate (ACTONEL) 150 mg tablet Take 1 Tab by mouth every thirty (30) days. 3 Tab 3    lancets misc One Touch Del Plus Lancets. Use to check BS 2 x a day 100 Each 11    triamcinolone acetonide (KENALOG) 0.1 % topical cream APPLY A THIN LAYER TO  AFFECTED AREA 2 TIMES A DAY 30 g 1    BD ULTRA-FINE MINI PEN NEEDLE 31 gauge x 3/16\" ndle USE TO INJECT 10 UNITS UNDER THE SKIN NIGHTLY 100 Pen Needle 5    OTHER One touch verio glucometer 1 Units 0    OTHER One touch verio test strips, monitor BS 2 x a day 50 Strip 11    calcium citrate-vitamin D3 (CITRACAL WITH VITAMIN D MAXIMUM) tablet Take 1 Tab by mouth daily. 30 Tab 5    flash glucose sensor (FREESTYLE TORSTEN 14 DAY SENSOR) kit 1 Units by Does Not Apply route every fourteen (14) days. 2 Kit 11    flash glucose scanning reader (FREESTYLE TORSTEN 14 DAY READER) misc 1 Units by Does Not Apply route daily. 1 Each 0    OTHER lantus solostar insulin U-100. 20 units at bedtime.  cinnamon bark (CINNAMON PO) Take 1,000 mg by mouth daily as needed (takes for  or greater).  SCHISANDRA PO Take  by mouth. schisandra extract 500 mg po once daily.  melatonin 3 mg tablet Take 3 mg by mouth nightly as needed.       Blood-Glucose Meter monitoring kit Use to check BS 2 x a day 1 Kit 0    aspirin 81 mg chewable tablet Take 81 mg by mouth daily.  ascorbic acid, vitamin C, (VITAMIN C) 500 mg tablet Take 500 mg by mouth daily.  LORazepam (ATIVAN) 0.5 mg tablet Take 1 tablet 30 minutes prior to MRI. May repeat once if needed. Max dose 1mg. 2 Tab 0       Exam:  Due to this being a TeleHealth evaluation, many elements of the physical examination are unable to be assessed. Patient experienced difficulty with connection during appointment. Patient-Reported Vitals 6/22/2020   Patient-Reported Weight 168lb   Patient-Reported Height 5'3   Patient-Reported Pulse 60   Patient-Reported Temperature (No Data)   Patient-Reported SpO2 (No Data)   Patient-Reported Systolic  (No Data)   Patient-Reported Diastolic (No Data)   NEUROLOGICAL EXAM:  General: Awake, alert, speech fluent. No dysarthria. LABS  Results for orders placed or performed during the hospital encounter of 02/19/20   CBC WITH AUTOMATED DIFF   Result Value Ref Range    WBC 6.0 3.6 - 11.0 K/uL    RBC 4.55 3.80 - 5.20 M/uL    HGB 11.8 11.5 - 16.0 g/dL    HCT 37.4 35.0 - 47.0 %    MCV 82.2 80.0 - 99.0 FL    MCH 25.9 (L) 26.0 - 34.0 PG    MCHC 31.6 30.0 - 36.5 g/dL    RDW 13.6 11.5 - 14.5 %    PLATELET 941 381 - 182 K/uL    MPV 10.5 8.9 - 12.9 FL    NRBC 0.0 0  WBC    ABSOLUTE NRBC 0.00 0.00 - 0.01 K/uL    NEUTROPHILS 54 32 - 75 %    LYMPHOCYTES 31 12 - 49 %    MONOCYTES 13 5 - 13 %    EOSINOPHILS 1 0 - 7 %    BASOPHILS 0 0 - 1 %    IMMATURE GRANULOCYTES 1 (H) 0.0 - 0.5 %    ABS. NEUTROPHILS 3.2 1.8 - 8.0 K/UL    ABS. LYMPHOCYTES 1.9 0.8 - 3.5 K/UL    ABS. MONOCYTES 0.8 0.0 - 1.0 K/UL    ABS. EOSINOPHILS 0.1 0.0 - 0.4 K/UL    ABS. BASOPHILS 0.0 0.0 - 0.1 K/UL    ABS. IMM.  GRANS. 0.0 0.00 - 0.04 K/UL    DF AUTOMATED     METABOLIC PANEL, COMPREHENSIVE   Result Value Ref Range    Sodium 136 136 - 145 mmol/L    Potassium 3.4 (L) 3.5 - 5.1 mmol/L    Chloride 104 97 - 108 mmol/L    CO2 30 21 - 32 mmol/L    Anion gap 2 (L) 5 - 15 mmol/L    Glucose 173 (H) 65 - 100 mg/dL    BUN 11 6 - 20 MG/DL    Creatinine 0.77 0.55 - 1.02 MG/DL    BUN/Creatinine ratio 14 12 - 20      GFR est AA >60 >60 ml/min/1.73m2    GFR est non-AA >60 >60 ml/min/1.73m2    Calcium 8.5 8.5 - 10.1 MG/DL    Bilirubin, total 0.2 0.2 - 1.0 MG/DL    ALT (SGPT) 29 12 - 78 U/L    AST (SGOT) 14 (L) 15 - 37 U/L    Alk. phosphatase 110 45 - 117 U/L    Protein, total 7.1 6.4 - 8.2 g/dL    Albumin 3.3 (L) 3.5 - 5.0 g/dL    Globulin 3.8 2.0 - 4.0 g/dL    A-G Ratio 0.9 (L) 1.1 - 2.2     TROPONIN I   Result Value Ref Range    Troponin-I, Qt. <0.05 <0.05 ng/mL   TROPONIN I   Result Value Ref Range    Troponin-I, Qt. <0.05 <0.05 ng/mL   EKG, 12 LEAD, INITIAL   Result Value Ref Range    Ventricular Rate 56 BPM    Atrial Rate 56 BPM    P-R Interval 198 ms    QRS Duration 78 ms    Q-T Interval 418 ms    QTC Calculation (Bezet) 403 ms    Calculated P Axis 42 degrees    Calculated T Axis 96 degrees    Diagnosis       Sinus bradycardia  Nonspecific T wave abnormality  When compared with ECG of 28-DEC-2017 22:47,  QT has shortened  Confirmed by Stephanie Painting M.D., [facility] ([id number]) on 2/19/2020 12:16:46 PM         IMAGING:  MRI Results (most recent):  Results from Hospital Encounter encounter on 12/29/19   MRI BRAIN W WO CONT    Narrative EXAM: MRI BRAIN W WO CONT    TECHNIQUE: Brain images including sagittal and axial T1-weighted, axial FLAIR,  T2-weighted, diffusion weighted, gradient echo,  precontrast. Axial and coronal  postcontrast T1-weighted images, sagittal T2 FLAIR images. IV CONTRAST:  15 cc Dotarem    INDICATION:  MS    COMPARISON:  10/18/2017    FINDINGS:  BRAIN PARENCHYMA:  Numerous scattered and moderately extensive confluent foci of  FLAIR/T2 hyperintensity in the cerebral white matter and patchy T2  hyperintensity in the catia.  Findings are consistent with patient's diagnosis of  multiple sclerosis, although there may be superimposed chronic small vessel  ischemic changes. The current study demonstrates a few additional lesions in the  corpus callosum which were not clearly seen on the previous study. However, the  prior study was performed on a low-field MRI unit and is not of the same  diagnostic quality as the current examination. No enhancing lesions are seen  currently. There are no areas of restricted diffusion. INTRACRANIAL HEMORRHAGE: None. CSF SPACES:  Normal in size and morphology for the patient's age. BASAL CISTERNS:  Patent. MIDLINE SHIFT: None. VASCULAR SYSTEM:  Normal flow voids. PARANASAL SINUSES AND MASTOID AIR CELLS:  Right maxillary sinus retention cysts. Otherwise clear. VISUALIZED ORBITS:  No significant abnormalities. VISUALIZED UPPER CERVICAL SPINE:  No significant abnormalities. SELLA:  No enlargement or  focal abnormality. SKULL BASE:  No significant abnormalities. Cerebellar tonsils in normal  position. CALVARIUM:  Intact. Impression IMPRESSION:  Moderately extensive white matter disease, consistent with the  patient's diagnosis of multiple sclerosis. Given the patient's risk factors,  there may be superimposed chronic small vessel ischemic changes. There may be a  slight increase in lesions in the corpus callosum on the current study when  compared to 2017, although the current study is of better technical quality. There are no acute findings or enhancing lesions. Assessment:     Encounter Diagnoses     ICD-10-CM ICD-9-CM   1. MS [facility]) ([facility] G35 340   2. Hep C w/o coma, chronic (HCC) B18.2 070.54   Pleasant 81 y.o. RHAAF h/o DM, HTN, HCV, depression and remote stroke here for f/u of MS dx 1999 (prior optic neuritis b/l, L paresthesias). Clinically, she appears stable without significant focal deficits. Denies recent exacerbations. Prior MRI Brain from 2014 independently reviewed and c/w MS with moderate periventricular T2 hyperintensities.   No evidence of cervical lesions on MRI C spine. MRI Brain last obtained 12/29/19 showing possible slight increase in overall MS lesion burden without active demyelination. Due to radiographic progression and patient's injection fatigue, she was transitioned to Vumerity. She appears to be doing well on current therapy. Will obtain updated imaging to evaluate for efficacy of multiple sclerosis disease modifying therapy. Disease activity in MS is often not immediately detectable on history or examination, but is sensitively identified on MRI. If identified, new or active MS lesions on MRI may represent suboptimal response to MS therapy and would change medical management. We reviewed the multiple sclerosis diagnosis, prognosis, and implications. We reviewed the 3-pronged treatment strategy: treating acute flares, using preventative treatments to prevent future relapses and brain lesions, and treating residual symptoms. For long-term treatment, I recommend continuation of Vumerity. Plan:   Continue Vumerity  Repeat MRI Brain WWO  CBC w/diff, CMP  Cont. Vit D supplementation      Follow-up and Dispositions    · Return in about 6 months (around 12/22/2020). Signed:  Swathi Weeks DO  6/22/2020    The duration of this appointment visit was 16 minutes of time with the patient. At least 50% of this time was spent in counseling, explanation of diagnosis, planning of further management, and coordination of care.

## 2020-07-10 ENCOUNTER — HOSPITAL ENCOUNTER (OUTPATIENT)
Dept: MRI IMAGING | Age: 70
Discharge: HOME | End: 2020-07-10
Attending: PSYCHIATRY & NEUROLOGY
Payer: MEDICAID

## 2020-07-10 DIAGNOSIS — G35 MS (MULTIPLE SCLEROSIS) (HCC): ICD-10-CM

## 2020-07-10 PROCEDURE — 74011250636 HC RX REV CODE- 250/636: Performed by: PSYCHIATRY & NEUROLOGY

## 2020-07-10 PROCEDURE — 70553 MRI BRAIN STEM W/O & W/DYE: CPT

## 2020-07-10 PROCEDURE — A9575 INJ GADOTERATE MEGLUMI 0.1ML: HCPCS | Performed by: PSYCHIATRY & NEUROLOGY

## 2020-07-10 RX ORDER — GADOTERATE MEGLUMINE 376.9 MG/ML
15 INJECTION INTRAVENOUS
Status: COMPLETED | OUTPATIENT
Start: 2020-07-10 | End: 2020-07-10

## 2020-07-10 RX ADMIN — GADOTERATE MEGLUMINE 15 ML: 376.9 INJECTION INTRAVENOUS at 14:54

## 2020-07-15 RX ORDER — AMLODIPINE BESYLATE 10 MG/1
TABLET ORAL
Qty: 90 TAB | Refills: 1 | Status: SHIPPED | OUTPATIENT
Start: 2020-07-15 | End: 2020-12-28 | Stop reason: SDUPTHER

## 2020-07-16 ENCOUNTER — TELEPHONE (OUTPATIENT)
Dept: NEUROLOGY | Age: 70
End: 2020-07-16

## 2020-08-25 LAB
ALBUMIN SERPL-MCNC: 4.6 G/DL (ref 3.8–4.8)
ALBUMIN/GLOB SERPL: 1.6 {RATIO} (ref 1.2–2.2)
ALP SERPL-CCNC: 96 IU/L (ref 39–117)
ALT SERPL-CCNC: 21 IU/L (ref 0–32)
AST SERPL-CCNC: 22 IU/L (ref 0–40)
BASOPHILS # BLD AUTO: 0 X10E3/UL (ref 0–0.2)
BASOPHILS NFR BLD AUTO: 1 %
BILIRUB SERPL-MCNC: 0.2 MG/DL (ref 0–1.2)
BUN SERPL-MCNC: 11 MG/DL (ref 8–27)
BUN SERPL-MCNC: 12 MG/DL (ref 8–27)
BUN/CREAT SERPL: 13 (ref 12–28)
BUN/CREAT SERPL: 14 (ref 12–28)
CALCIUM SERPL-MCNC: 9.6 MG/DL (ref 8.7–10.3)
CALCIUM SERPL-MCNC: 9.6 MG/DL (ref 8.7–10.3)
CHLORIDE SERPL-SCNC: 101 MMOL/L (ref 96–106)
CHLORIDE SERPL-SCNC: 102 MMOL/L (ref 96–106)
CO2 SERPL-SCNC: 24 MMOL/L (ref 20–29)
CO2 SERPL-SCNC: 25 MMOL/L (ref 20–29)
CREAT SERPL-MCNC: 0.84 MG/DL (ref 0.57–1)
CREAT SERPL-MCNC: 0.84 MG/DL (ref 0.57–1)
EOSINOPHIL # BLD AUTO: 0.1 X10E3/UL (ref 0–0.4)
EOSINOPHIL NFR BLD AUTO: 2 %
ERYTHROCYTE [DISTWIDTH] IN BLOOD BY AUTOMATED COUNT: 13 % (ref 11.7–15.4)
EST. AVERAGE GLUCOSE BLD GHB EST-MCNC: 186 MG/DL
GLOBULIN SER CALC-MCNC: 2.8 G/DL (ref 1.5–4.5)
GLUCOSE SERPL-MCNC: 147 MG/DL (ref 65–99)
GLUCOSE SERPL-MCNC: 150 MG/DL (ref 65–99)
HBA1C MFR BLD: 8.1 % (ref 4.8–5.6)
HCT VFR BLD AUTO: 41.8 % (ref 34–46.6)
HGB BLD-MCNC: 13.2 G/DL (ref 11.1–15.9)
IMM GRANULOCYTES # BLD AUTO: 0 X10E3/UL (ref 0–0.1)
IMM GRANULOCYTES NFR BLD AUTO: 0 %
LYMPHOCYTES # BLD AUTO: 1.5 X10E3/UL (ref 0.7–3.1)
LYMPHOCYTES NFR BLD AUTO: 39 %
MCH RBC QN AUTO: 25.2 PG (ref 26.6–33)
MCHC RBC AUTO-ENTMCNC: 31.6 G/DL (ref 31.5–35.7)
MCV RBC AUTO: 80 FL (ref 79–97)
MONOCYTES # BLD AUTO: 0.3 X10E3/UL (ref 0.1–0.9)
MONOCYTES NFR BLD AUTO: 9 %
NEUTROPHILS # BLD AUTO: 1.9 X10E3/UL (ref 1.4–7)
NEUTROPHILS NFR BLD AUTO: 49 %
PLATELET # BLD AUTO: 288 X10E3/UL (ref 150–450)
POTASSIUM SERPL-SCNC: 4 MMOL/L (ref 3.5–5.2)
POTASSIUM SERPL-SCNC: 4.1 MMOL/L (ref 3.5–5.2)
PROT SERPL-MCNC: 7.4 G/DL (ref 6–8.5)
RBC # BLD AUTO: 5.24 X10E6/UL (ref 3.77–5.28)
SODIUM SERPL-SCNC: 141 MMOL/L (ref 134–144)
SODIUM SERPL-SCNC: 143 MMOL/L (ref 134–144)
WBC # BLD AUTO: 3.8 X10E3/UL (ref 3.4–10.8)

## 2020-08-26 NOTE — PROGRESS NOTES
HgbA1c is 8.1, improved from previous. Goal is <7. Recommend that patient watch diet for sugars and carbohydrates and exercise as tolerated. Continue current medications and follow-up with Dr. Leon Jaramillo, as scheduled.

## 2020-09-09 RX ORDER — ATORVASTATIN CALCIUM 20 MG/1
TABLET, FILM COATED ORAL
Qty: 65 TAB | Refills: 4 | Status: SHIPPED | OUTPATIENT
Start: 2020-09-09 | End: 2021-07-07

## 2020-09-15 ENCOUNTER — VIRTUAL VISIT (OUTPATIENT)
Dept: INTERNAL MEDICINE CLINIC | Age: 70
End: 2020-09-15
Payer: COMMERCIAL

## 2020-09-15 DIAGNOSIS — M81.6 LOCALIZED OSTEOPOROSIS, UNSPECIFIED PATHOLOGICAL FRACTURE PRESENCE: ICD-10-CM

## 2020-09-15 DIAGNOSIS — Z79.4 TYPE 2 DIABETES MELLITUS WITH DIABETIC NEPHROPATHY, WITH LONG-TERM CURRENT USE OF INSULIN (HCC): ICD-10-CM

## 2020-09-15 DIAGNOSIS — I10 ESSENTIAL HYPERTENSION: ICD-10-CM

## 2020-09-15 DIAGNOSIS — M25.511 CHRONIC RIGHT SHOULDER PAIN: Primary | ICD-10-CM

## 2020-09-15 DIAGNOSIS — M06.9 RHEUMATOID ARTHRITIS, INVOLVING UNSPECIFIED SITE, UNSPECIFIED RHEUMATOID FACTOR PRESENCE: ICD-10-CM

## 2020-09-15 DIAGNOSIS — G89.29 CHRONIC RIGHT SHOULDER PAIN: Primary | ICD-10-CM

## 2020-09-15 DIAGNOSIS — E11.21 TYPE 2 DIABETES MELLITUS WITH DIABETIC NEPHROPATHY, WITH LONG-TERM CURRENT USE OF INSULIN (HCC): ICD-10-CM

## 2020-09-15 PROCEDURE — 3052F HG A1C>EQUAL 8.0%<EQUAL 9.0%: CPT | Performed by: INTERNAL MEDICINE

## 2020-09-15 PROCEDURE — 99214 OFFICE O/P EST MOD 30 MIN: CPT | Performed by: INTERNAL MEDICINE

## 2020-09-15 RX ORDER — RALOXIFENE HYDROCHLORIDE 60 MG/1
60 TABLET, FILM COATED ORAL DAILY
Qty: 60 TAB | Refills: 0 | Status: SHIPPED | OUTPATIENT
Start: 2020-09-15 | End: 2020-10-27

## 2020-09-15 RX ORDER — HYDROCODONE BITARTRATE AND ACETAMINOPHEN 5; 325 MG/1; MG/1
1 TABLET ORAL
Qty: 14 TAB | Refills: 0 | Status: SHIPPED | OUTPATIENT
Start: 2020-09-15 | End: 2020-09-22 | Stop reason: ALTCHOICE
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
## 2020-09-15 NOTE — PROGRESS NOTES
Health Maintenance Due   Topic Date Due    Eye Exam Retinal or Dilated  07/18/1962    Colonoscopy  07/18/1970    DTaP/Tdap/Td series (1 - Tdap) 07/18/1973    Shingrix Vaccine Age 50> (1 of 2) 07/18/2002    Foot Exam Q1  08/30/2020    Flu Vaccine (1) 09/01/2020    GLAUCOMA SCREENING Q2Y  09/12/2020       Chief Complaint   Patient presents with    Arm Pain     worse ovr last month    Hypertension    Diabetes    Hand Pain    Medication Evaluation     boneva       1. Have you been to the ER, urgent care clinic since your last visit? Hospitalized since your last visit? No    2. Have you seen or consulted any other health care providers outside of the [address] [place] [address] since your last visit? Include any pap smears or colon screening. No    3) Do you have an Advance Directive on file? yes    4) Are you interested in receiving information on Advance Directives? NO      Patient is accompanied by self I have received verbal consent from Tiago Clarke to discuss any/all medical information while they are present in the room.

## 2020-09-22 ENCOUNTER — VIRTUAL VISIT (OUTPATIENT)
Dept: INTERNAL MEDICINE CLINIC | Age: 70
End: 2020-09-22
Payer: COMMERCIAL

## 2020-09-22 DIAGNOSIS — Z79.4 TYPE 2 DIABETES MELLITUS WITH DIABETIC NEPHROPATHY, WITH LONG-TERM CURRENT USE OF INSULIN (HCC): Primary | ICD-10-CM

## 2020-09-22 DIAGNOSIS — B18.2 HEP C W/O COMA, CHRONIC (HCC): ICD-10-CM

## 2020-09-22 DIAGNOSIS — M06.9 RHEUMATOID ARTHRITIS, INVOLVING UNSPECIFIED SITE, UNSPECIFIED RHEUMATOID FACTOR PRESENCE: ICD-10-CM

## 2020-09-22 DIAGNOSIS — I10 ESSENTIAL HYPERTENSION: ICD-10-CM

## 2020-09-22 DIAGNOSIS — G35 MS (MULTIPLE SCLEROSIS) (HCC): ICD-10-CM

## 2020-09-22 DIAGNOSIS — F33.42 RECURRENT MAJOR DEPRESSIVE DISORDER, IN FULL REMISSION (HCC): ICD-10-CM

## 2020-09-22 DIAGNOSIS — E11.21 TYPE 2 DIABETES MELLITUS WITH DIABETIC NEPHROPATHY, WITH LONG-TERM CURRENT USE OF INSULIN (HCC): Primary | ICD-10-CM

## 2020-09-22 PROCEDURE — 3052F HG A1C>EQUAL 8.0%<EQUAL 9.0%: CPT | Performed by: INTERNAL MEDICINE

## 2020-09-22 PROCEDURE — 99214 OFFICE O/P EST MOD 30 MIN: CPT | Performed by: INTERNAL MEDICINE

## 2020-09-22 NOTE — PROGRESS NOTES
ADVISED PATIENT OF THE FOLLOWING HEALTH MAINTAINCE DUE  Health Maintenance Due   Topic Date Due    Eye Exam Retinal or Dilated  07/18/1962    Colonoscopy  07/18/1970    DTaP/Tdap/Td series (1 - Tdap) 07/18/1973    Foot Exam Q1  08/30/2020    GLAUCOMA SCREENING Q2Y  09/12/2020      Chief Complaint   Patient presents with    Hypertension     4 month f/u     Diabetes    Arthritis     7/10    Multiple Sclerosis    Depression       1. Have you been to the ER, urgent care clinic since your last visit? Hospitalized since your last visit? No    2. Have you seen or consulted any other health care providers outside of the [address] [place] [address] since your last visit? Include any DEXA scan, mammography  or colon screening. No    3. Do you have an Advance Directive on file? no    4. Do you have a DNR on file? no    Patient is accompanied by self I have received verbal consent from Shiva Young to discuss any/all medical information while they are present in the room. Advance Care Planning 12/29/2017   Patient's Healthcare Decision Maker is: Legal Next of Kin   Confirm Advance Directive None         [facility] [facility]  [facility]  [address]  [facility] [address]  Phone: [phone] Fax: [phone]    [address], [address] [place] [address] Drive  [address] [place] [address]  Phone: [phone] Fax: [phone] - [facility], [place] [address]  Phone: [phone] Fax: [phone]        Patient reminded during visit to bring all medication bottles, OTC medications to all appointments.
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
## 2020-10-05 ENCOUNTER — DOCUMENTATION ONLY (OUTPATIENT)
Dept: RHEUMATOLOGY | Age: 70
End: 2020-10-05

## 2020-10-05 NOTE — PROGRESS NOTES
Left VM for NP to confirm arrival time 11:00am for an appointment on 10/06/20 with Dr. Edgardo Johnson.

## 2020-10-26 RX ORDER — CITALOPRAM 20 MG/1
TABLET, FILM COATED ORAL
Qty: 31 TAB | Refills: 10 | Status: SHIPPED | OUTPATIENT
Start: 2020-10-26 | End: 2021-06-28

## 2020-10-27 DIAGNOSIS — M81.6 LOCALIZED OSTEOPOROSIS, UNSPECIFIED PATHOLOGICAL FRACTURE PRESENCE: ICD-10-CM

## 2020-10-27 RX ORDER — PEN NEEDLE, DIABETIC 31 GX5/16"
NEEDLE, DISPOSABLE MISCELLANEOUS
Qty: 100 PEN NEEDLE | Refills: 3 | Status: SHIPPED | OUTPATIENT
Start: 2020-10-27 | End: 2021-11-30 | Stop reason: SDUPTHER

## 2020-10-27 RX ORDER — RALOXIFENE HYDROCHLORIDE 60 MG/1
TABLET, FILM COATED ORAL
Qty: 60 TAB | Refills: 0 | Status: SHIPPED | OUTPATIENT
Start: 2020-10-27 | End: 2022-06-13

## 2020-11-23 RX ORDER — OMEPRAZOLE 20 MG/1
CAPSULE, DELAYED RELEASE ORAL
Qty: 90 CAP | Refills: 3 | Status: SHIPPED | OUTPATIENT
Start: 2020-11-23 | End: 2021-12-13

## 2020-11-23 RX ORDER — CLONIDINE HYDROCHLORIDE 0.1 MG/1
TABLET ORAL
Qty: 180 TAB | Refills: 3 | Status: SHIPPED | OUTPATIENT
Start: 2020-11-23 | End: 2020-12-18 | Stop reason: SDUPTHER

## 2020-11-23 RX ORDER — METOPROLOL SUCCINATE 200 MG/1
TABLET, EXTENDED RELEASE ORAL
Qty: 90 TAB | Refills: 3 | Status: SHIPPED | OUTPATIENT
Start: 2020-11-23 | End: 2021-12-13

## 2020-12-18 ENCOUNTER — VIRTUAL VISIT (OUTPATIENT)
Dept: INTERNAL MEDICINE CLINIC | Age: 70
End: 2020-12-18
Payer: COMMERCIAL

## 2020-12-18 DIAGNOSIS — G35 MS (MULTIPLE SCLEROSIS) (HCC): ICD-10-CM

## 2020-12-18 DIAGNOSIS — E11.21 TYPE 2 DIABETES MELLITUS WITH DIABETIC NEPHROPATHY, WITH LONG-TERM CURRENT USE OF INSULIN (HCC): ICD-10-CM

## 2020-12-18 DIAGNOSIS — I10 ESSENTIAL HYPERTENSION: ICD-10-CM

## 2020-12-18 DIAGNOSIS — R51.9 NONINTRACTABLE HEADACHE, UNSPECIFIED CHRONICITY PATTERN, UNSPECIFIED HEADACHE TYPE: Primary | ICD-10-CM

## 2020-12-18 DIAGNOSIS — Z79.4 TYPE 2 DIABETES MELLITUS WITH DIABETIC NEPHROPATHY, WITH LONG-TERM CURRENT USE OF INSULIN (HCC): ICD-10-CM

## 2020-12-18 PROCEDURE — 99442 PR PHYS/QHP TELEPHONE EVALUATION 11-20 MIN: CPT | Performed by: NURSE PRACTITIONER

## 2020-12-18 RX ORDER — AMMONIUM LACTATE 12 G/100G
LOTION TOPICAL
Qty: 3 BOTTLE | Refills: 1 | Status: SHIPPED | OUTPATIENT
Start: 2020-12-18 | End: 2022-07-19

## 2020-12-18 RX ORDER — CLONIDINE HYDROCHLORIDE 0.1 MG/1
TABLET ORAL
Qty: 180 TAB | Refills: 1 | Status: SHIPPED | OUTPATIENT
Start: 2020-12-18 | End: 2021-06-01

## 2020-12-18 RX ORDER — INSULIN GLARGINE 100 [IU]/ML
25 INJECTION, SOLUTION SUBCUTANEOUS
COMMUNITY
Start: 2019-06-20 | End: 2020-12-18 | Stop reason: SDUPTHER

## 2020-12-18 RX ORDER — IBUPROFEN 800 MG/1
800 TABLET ORAL
Qty: 30 TAB | Refills: 0 | Status: SHIPPED | OUTPATIENT
Start: 2020-12-18 | End: 2021-03-15 | Stop reason: SDUPTHER

## 2020-12-18 NOTE — PROGRESS NOTES
ADVISED PATIENT OF THE FOLLOWING HEALTH MAINTAINCE DUE  Health Maintenance Due   Topic Date Due    Eye Exam Retinal or Dilated  07/18/1962    DTaP/Tdap/Td series (1 - Tdap) 07/18/1973    Colorectal Cancer Screening Combo  07/18/2002    GLAUCOMA SCREENING Q2Y  09/12/2020    MICROALBUMIN Q1  01/09/2021      Chief Complaint   Patient presents with    Headache     7/10 PAIN  started about a month ago bc of elevated BP     Hypertension    Diabetes    Multiple Sclerosis    Arthritis     RA        1. Have you been to the ER, urgent care clinic since your last visit? Hospitalized since your last visit? No    2. Have you seen or consulted any other health care providers outside of the [address] [place] [address] since your last visit? Include any DEXA scan, mammography  or colon screening. No    3. Do you have an Advance Directive on file? no    4. Do you have a DNR on file? no    Patient is accompanied by self I have received verbal consent from Mia Sosa to discuss any/all medical information while they are present in the room. Advance Care Planning 12/29/2017   Patient's Healthcare Decision Maker is: Legal Next of Kin   Confirm Advance Directive None         [facility] [address] [facility]  [facility]  [address]  [facility] [address]  Phone: [phone] Fax: [phone]    [address], [address] [place] [address] [address]  [address] [place] [id number]  Phone: [phone] Fax: [phone] [facility] [address]  Phone: [phone] Fax: [phone]        Patient reminded during visit to bring all medication bottles, OTC medications to all appointments.
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
## 2020-12-28 RX ORDER — AMLODIPINE BESYLATE 10 MG/1
TABLET ORAL
Qty: 90 TAB | Refills: 3 | Status: SHIPPED | OUTPATIENT
Start: 2020-12-28 | End: 2021-12-13

## 2021-01-08 ENCOUNTER — VIRTUAL VISIT (OUTPATIENT)
Dept: INTERNAL MEDICINE CLINIC | Age: 71
End: 2021-01-08
Payer: COMMERCIAL

## 2021-01-08 DIAGNOSIS — I10 ESSENTIAL HYPERTENSION: Primary | ICD-10-CM

## 2021-01-08 DIAGNOSIS — E11.21 TYPE 2 DIABETES WITH NEPHROPATHY (HCC): ICD-10-CM

## 2021-01-08 DIAGNOSIS — F33.42 RECURRENT MAJOR DEPRESSIVE DISORDER, IN FULL REMISSION (HCC): ICD-10-CM

## 2021-01-08 DIAGNOSIS — K21.9 GASTROESOPHAGEAL REFLUX DISEASE WITHOUT ESOPHAGITIS: ICD-10-CM

## 2021-01-08 DIAGNOSIS — R51.9 NONINTRACTABLE HEADACHE, UNSPECIFIED CHRONICITY PATTERN, UNSPECIFIED HEADACHE TYPE: ICD-10-CM

## 2021-01-08 DIAGNOSIS — G35 MS (MULTIPLE SCLEROSIS) (HCC): ICD-10-CM

## 2021-01-08 PROCEDURE — 99214 OFFICE O/P EST MOD 30 MIN: CPT | Performed by: INTERNAL MEDICINE

## 2021-01-08 NOTE — PROGRESS NOTES
Health Maintenance Due   Topic Date Due    Eye Exam Retinal or Dilated  07/18/1962    DTaP/Tdap/Td series (1 - Tdap) 07/18/1973    Colorectal Cancer Screening Combo  07/18/2002    GLAUCOMA SCREENING Q2Y  09/12/2020    MICROALBUMIN Q1  01/09/2021    Lipid Screen  01/09/2021       Chief Complaint   Patient presents with    Shoulder Pain    Headache       1. Have you been to the ER, urgent care clinic since your last visit? Hospitalized since your last visit? No    2. Have you seen or consulted any other health care providers outside of the [address] [place] [address] since your last visit? Include any pap smears or colon screening. No    3) Do you have an Advance Directive on file? no    4) Are you interested in receiving information on Advance Directives? NO      Patient is accompanied by self I have received verbal consent from Claudette Said to discuss any/all medical information while they are present in the room.
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
## 2021-02-09 ENCOUNTER — TELEPHONE (OUTPATIENT)
Dept: NEUROLOGY | Age: 71
End: 2021-02-09

## 2021-02-09 NOTE — TELEPHONE ENCOUNTER
Re: Pernell Campbell approved    PA request sent to [facility] via [address]: [id number]    Effective 02/09/20-02/09/22  PA # [id number]    Approval fax sent to [employer] and patient called with update.

## 2021-02-17 ENCOUNTER — TELEPHONE (OUTPATIENT)
Dept: NEUROLOGY | Age: 71
End: 2021-02-17

## 2021-02-17 RX ORDER — DIROXIMEL FUMARATE 231 MG/1
462 CAPSULE ORAL 2 TIMES DAILY
Qty: 120 TAB | Refills: 1 | Status: SHIPPED | OUTPATIENT
Start: 2021-02-17 | End: 2021-05-06

## 2021-02-17 NOTE — TELEPHONE ENCOUNTER
Spoke with daughter, informed her  is okay refilling Vumerity until follow up in March. She is requesting Rx be sent to [facility] on [address]. She wanted to know if that medication could be picked up, informed her I was unsure but to contact pharmacy and see. She verbalized understanding.

## 2021-02-17 NOTE — TELEPHONE ENCOUNTER
Pt's daughter calling stating patient has missed 10 doses of her Vumerity because the pharmacy hasn't delivered it. She is wondering if it can be sent to [facility] to be picked up today.  She is requesting a call back

## 2021-02-22 ENCOUNTER — TELEPHONE (OUTPATIENT)
Dept: NEUROLOGY | Age: 71
End: 2021-02-22

## 2021-02-22 NOTE — TELEPHONE ENCOUNTER
Pt's daughter calling back, medication has been received, but due to the fact that she has gone without for a week, should she start back at dosage she was currently taking or the start dose from the beginning.

## 2021-02-22 NOTE — TELEPHONE ENCOUNTER
----- Message from Pili Dennison sent at 2/19/2021  4:36 PM EST -----  Regarding: DO Soriano/Telephone  General Message/Vendor Calls    Caller's first and last name: Sal Wright (Daughter)      Reason for call: Daughter would like to discuss whether pt should continue to take normal dosage or start at low dosage for medication \"Vumerity\"      Callback required yes/no and why: yes, to discuss whether pt should continue to take normal dosage or start at low dosage for medication \"Vumerity\"      Best contact number(s): [phone]      Details to clarify the request: Daughter would like to discuss whether pt should continue to take normal dosage or start at low dosage for medication \"Vumerity\"      Pili Dennison

## 2021-02-22 NOTE — TELEPHONE ENCOUNTER
Spoke with daughter, patient hasn't taken Vumerity x8 days due to issue with pharmacy supplying medication. She has medication now, does she need to take medication at a lower dose and titrate back up?

## 2021-02-22 NOTE — TELEPHONE ENCOUNTER
----- Message from Abisai Jaime sent at 2/19/2021  4:20 PM EST -----  Regarding: DO Soriano/Telephone  General Message/Vendor Calls    Caller's first and last name: Doug Greenfield (Daughter)      Reason for call: Daughter would like to discuss any samples of medication \"Vumerity\" the office may have for pt      Callback required yes/no and why: yes, to discuss any samples of medication \"Vumerity\" the office may have for pt      Best contact number(s): [phone]      Details to clarify the request: Daughter would like to discuss any samples of medication \"Vumerity\" the office may have for pt until medication get delivered to her home      Abisai Jaime

## 2021-02-22 NOTE — TELEPHONE ENCOUNTER
Pt's daughter calling stating pt hasn't had her medication in over a week.  She is wondering if there are any samples she can have

## 2021-02-23 NOTE — TELEPHONE ENCOUNTER
Called daughter to inform her per -Start 231mg BID x 7 days, then resume 462mg BID. No answer. Left message to call back.

## 2021-03-11 ENCOUNTER — TELEPHONE (OUTPATIENT)
Dept: NEUROLOGY | Age: 71
End: 2021-03-11

## 2021-03-11 RX ORDER — MECLIZINE HYDROCHLORIDE 25 MG/1
25 TABLET ORAL
Qty: 30 TAB | Refills: 0 | Status: SHIPPED | OUTPATIENT
Start: 2021-03-11 | End: 2021-03-21

## 2021-03-11 NOTE — TELEPHONE ENCOUNTER
Pt calling stating she has had dizzy spells everyday and wants to know if meclizine 25 mg can be called into [facility]. Stated she hasn't had medication in quite some time due to the fact that she hasn't been dizzy.

## 2021-03-16 RX ORDER — IBUPROFEN 800 MG/1
800 TABLET ORAL
Qty: 30 TAB | Refills: 0 | Status: SHIPPED | OUTPATIENT
Start: 2021-03-16

## 2021-03-25 LAB — HBA1C MFR BLD HPLC: 8 %

## 2021-05-13 ENCOUNTER — VIRTUAL VISIT (OUTPATIENT)
Dept: INTERNAL MEDICINE CLINIC | Facility: CLINIC | Age: 71
End: 2021-05-13
Payer: COMMERCIAL

## 2021-05-13 DIAGNOSIS — M79.641 BILATERAL HAND PAIN: ICD-10-CM

## 2021-05-13 DIAGNOSIS — G35 MS (MULTIPLE SCLEROSIS) (HCC): ICD-10-CM

## 2021-05-13 DIAGNOSIS — M79.642 BILATERAL HAND PAIN: ICD-10-CM

## 2021-05-13 DIAGNOSIS — I10 ESSENTIAL HYPERTENSION: Primary | ICD-10-CM

## 2021-05-13 DIAGNOSIS — B18.2 HEP C W/O COMA, CHRONIC (HCC): ICD-10-CM

## 2021-05-13 DIAGNOSIS — E11.21 TYPE 2 DIABETES WITH NEPHROPATHY (HCC): ICD-10-CM

## 2021-05-13 PROCEDURE — 99214 OFFICE O/P EST MOD 30 MIN: CPT | Performed by: INTERNAL MEDICINE

## 2021-05-13 NOTE — PROGRESS NOTES
ADVISED PATIENT OF THE FOLLOWING HEALTH MAINTAINCE DUE  Health Maintenance Due   Topic Date Due    Eye Exam Retinal or Dilated  Never done    DTaP/Tdap/Td series (1 - Tdap) Never done    Colorectal Cancer Screening Combo  Never done    MICROALBUMIN Q1  01/09/2021    Lipid Screen  01/09/2021    Breast Cancer Screen Mammogram  02/20/2021      Chief Complaint   Patient presents with    Hypertension    Diabetes    Multiple Sclerosis       1. Have you been to the ER, urgent care clinic since your last visit? Hospitalized since your last visit? No    2. Have you seen or consulted any other health care providers outside of the [address] [place] [address] since your last visit? Include any DEXA scan, mammography  or colon screening. No    3. Do you have an Advance Directive on file? no    4. Do you have a DNR on file? no    Patient is accompanied by self I have received verbal consent from Stacy Sabillon to discuss any/all medical information while they are present in the room. Advance Care Planning 12/29/2017   Patient's Healthcare Decision Maker is: Legal Next of Kin   Confirm Advance Directive None         [facility] [address] [facility]  [place] [facility]  [address]  [facility] [address]  Phone: [phone] Fax: [phone] [address] [facility], [address] [place] [address] Drive  [address] [place] [address]  Phone: [phone] Fax: [phone] [address]  Phone: [phone] Fax: [phone]    [address]  [address] [address]  Phone: [phone] Fax: [phone]        Patient reminded during visit to bring all medication bottles, OTC medications to all appointments.

## 2021-05-13 NOTE — PROGRESS NOTES
Taras Taylor (: 1950) is a 78 y.o. female, established patient, here for evaluation of the following chief complaint(s):   Hypertension, Diabetes, and Multiple Sclerosis       ASSESSMENT/PLAN:  Below is the assessment and plan developed based on review of pertinent labs, studies, and medications. 1. Essential hypertension  2. Type 2 diabetes with nephropathy (HCC)  3. MS (multiple sclerosis) ([facility]  4. Hep C w/o coma, chronic (HCC)  5. Bilateral hand pain      Return in about 4 months (around 2021). SUBJECTIVE/OBJECTIVE:  Pt. is seen virtually for f/u. Has a few chronic medical issues as documented. Reports stable vitals at home. Reports sugars running in mid 100s. Last A1c was 8.1. Has diabetic nephropathy. Denies vision changes. Some neuropathy issues. No skin problems. Followed by neurology for MS. Last brain MRI in 2020 was stable. Slow gait but no falls. Also has history of RA. Has seen rheumatologist in the past.  Reports bilateral hand pain. She also has been followed by hepatology for hepatitis C. Reports overall feeling okay. Taking precautions for Covid 19. Denies any related signs or symptoms including fever, cough, SOB or CP. PMH/PSH/Allergies/Social History/medication list and most recent studies reviewed with patient. Tobacco use: No  Alcohol use: No  Reports compliance with medications and diet. Trying to be active physically to control weight. Reports no other new c/o.     Plan:  Continue current medications  Monitor BS at home with goal of 100-150  Monitor BP at home with goal of 140/90 or less  Watch diet and remain active physically  Follow-up with other MDs/specialists as scheduled  COVID-19 precautions discussed with pt  Follow-up 4 months or as needed        Physical Exam    [INSTRUCTIONS:  \"[x]\" Indicates a positive item  \"[]\" Indicates a negative item  -- DELETE ALL ITEMS NOT EXAMINED]    Constitutional: [x] Appears well-developed and well-nourished [x] No apparent distress      [] Abnormal -     Mental status: [x] Alert and awake  [x] Oriented to person/place/time [x] Able to follow commands    [] Abnormal -     Eyes:   EOM    [x]  Normal    [] Abnormal -   Sclera  [x]  Normal    [] Abnormal -          Discharge [x]  None visible   [] Abnormal -     HENT: [x] Normocephalic, atraumatic  [] Abnormal -   [x] Mouth/Throat: Mucous membranes are moist    External Ears [x] Normal  [] Abnormal -    Neck: [x] No visualized mass [] Abnormal -     Pulmonary/Chest: [x] Respiratory effort normal   [x] No visualized signs of difficulty breathing or respiratory distress        [] Abnormal -      Musculoskeletal:   [x] Normal gait with no signs of ataxia         [x] Normal range of motion of neck        [] Abnormal -     Neurological:        [x] No Facial Asymmetry (Cranial nerve 7 motor function) (limited exam due to video visit)          [x] No gaze palsy        [] Abnormal -          Skin:        [x] No significant exanthematous lesions or discoloration noted on facial skin         [] Abnormal -            Psychiatric:       [x] Normal Affect [] Abnormal -        [x] No Hallucinations    Other pertinent observable physical exam findings:-        On this date 05/13/2021 I have spent 25   minutes reviewing previous notes, test results and face to face (virtual) with the patient discussing the diagnosis and importance of compliance with the treatment plan as well as documenting on the day of the visit. Claribel Ferrer, was evaluated through a synchronous (real-time) audio-video encounter. The patient (or guardian if applicable) is aware that this is a billable service. Verbal consent to proceed has been obtained within the past 12 months. The visit was conducted pursuant to the emergency declaration under the [facility], [address] [place] [address] authority and the [email or web address] and [employer] Act.   Patient identification was verified, and a caregiver was present when appropriate. The patient was located in a state where the provider was credentialed to provide care. An electronic signature was used to authenticate this note.   -- Kelly Lopes DO

## 2021-06-01 RX ORDER — CLONIDINE HYDROCHLORIDE 0.1 MG/1
TABLET ORAL
Qty: 180 TABLET | Refills: 3 | Status: SHIPPED | OUTPATIENT
Start: 2021-06-01 | End: 2022-05-16

## 2021-06-02 ENCOUNTER — OFFICE VISIT (OUTPATIENT)
Dept: NEUROLOGY | Age: 71
End: 2021-06-02
Payer: COMMERCIAL

## 2021-06-02 VITALS
DIASTOLIC BLOOD PRESSURE: 80 MMHG | SYSTOLIC BLOOD PRESSURE: 138 MMHG | RESPIRATION RATE: 18 BRPM | OXYGEN SATURATION: 98 %

## 2021-06-02 DIAGNOSIS — F40.240 CLAUSTROPHOBIA: Primary | ICD-10-CM

## 2021-06-02 DIAGNOSIS — G44.219 EPISODIC TENSION-TYPE HEADACHE, NOT INTRACTABLE: ICD-10-CM

## 2021-06-02 DIAGNOSIS — G35 MS (MULTIPLE SCLEROSIS) (HCC): Primary | ICD-10-CM

## 2021-06-02 DIAGNOSIS — M25.50 ARTHRALGIA, UNSPECIFIED JOINT: ICD-10-CM

## 2021-06-02 PROCEDURE — 99214 OFFICE O/P EST MOD 30 MIN: CPT | Performed by: PSYCHIATRY & NEUROLOGY

## 2021-06-02 NOTE — TELEPHONE ENCOUNTER
When patient was leaving today's appointment, she requested Ativan for claustrophobia when having her MRI done. Advised her of directions and no driving while taking medication.

## 2021-06-02 NOTE — PROGRESS NOTES
Neurology Clinic Follow up Note    Patient ID:  Leighton Brown  [id number]  78 y.o.  1950      Ms. Erick Alamo is here for follow up today of MS     Last Appointment With Me:  6/22/2020    Interval History:   Leighton Brown is a 78 y.o. right handed female with a PMH including DM, HTN, HCV, remote stroke presenting for f/u of MS. Date of onset: 1997-experienced knee buckling with falls, paresthesias L side later progressing to both feet, +fatigue, +optic neuritis b/l  Dx made via MRI and LP. Date of diagnosis: 1999    Disease course from Onset: RRMS    Disease course last year: RRMS, stable    Last imaging:   MRI Brain O 7/10/20: stable MS lesion burden, no evidence of active demyelination  MRI Brain [facility] COGNITIVE DISORDERS 12/29/19  Moderately extensive white matter disease, c/w multiple sclerosis, possible slight increase in lesions of the corpus callosum. No enhancing lesions. Medications for MS Used in the Past:   Vumerity (current since 2/2020)-tolerating medication well. Avonex- doesn't like injections due to injection site tenderness  Tecfidera- could not swallow pills, +nausea, flushing      Interval history:  Patient lost to f/u for the past year. She reports some occasional sharp pains over the R hemisphere lasting a few seconds typically occurring 1-2x weekly. No other headaches, photophobia. Endorses intermittent nausea. She has experienced headaches over the R hemisphere in the past as well, non-migrainous appearing. Never on preventative therapy. Denies jaw claudication or vision loss. Denies new focal weakness, numbness, vision loss. +fatigue. Walking is stable, no significant falls. She denies cognitive decline/impairment. Compliant with DMT/Vumerity. Tolerating medication well.        Fatigue: severe fatigue    Memory/Concentration:  Denies current issues    Bladder: frequent urination affecting sleep, urinary urgency    Bowel: constipation improved    Depression: +Anxiety, mild depression on medication. Pain: as described above    PMHx/ PSHx/ FHx/ SHx:  Reviewed and unchanged previous visit. Past Medical History:   Diagnosis Date    Arrhythmia     irregular heatbeat/heart murmur - evaluated and ok    Arthritis     OSTEO A & OSTEOPOROSIS     Autoimmune disease ([facility] [address] 1999    MS    Chronic pain     all over    Depression     Diabetes ([facility] [address] [facility]     GERD (gastroesophageal reflux disease)     hiatal hernia    Hepatitis C     Hypertension     LBP (low back pain)     Lipoma 9/3/2013    Liver disease     CHRONIC HEP C - stage 3 fibrosis    MS (multiple sclerosis) ([facility] [address] [facility]     Other ill-defined conditions(799.89)     MS    Other ill-defined conditions(799.89)     HX DRUG ABUSE. CLEAN FOR 21 YRS.  Psychiatric disorder     DEPRESSION.  PUD (peptic ulcer disease)     duodenal ulcer    Stroke [facility])     Diagnosed 10/2011 - no deficits now    Urinary incontinence          ROS:  Comprehensive review of systems negative except for as noted above. Objective:       Meds:  Current Outpatient Medications   Medication Sig Dispense Refill    cloNIDine HCL (CATAPRES) 0.1 mg tablet TAKE 1 TABLET BY MOUTH  TWICE DAILY 180 Tablet 3    Vumerity 231 mg cpDR Take 2 capsules (462mg) by mouth twice daily 120 Tab 0    insulin glargine (Lantus Solostar U-100 Insulin) 100 unit/mL (3 mL) inpn INJECT SUBCUTANEOUSLY 25  UNITS AT BEDTIME 30 mL 1    ibuprofen (MOTRIN) 800 mg tablet Take 1 Tab by mouth every eight (8) hours as needed for Pain.  30 Tab 0    OneTouch Delica Plus Lancet 30 gauge misc USE TO CHECK BLOOD SUGAR  TWO TIMES A  Lancet 5    amLODIPine (NORVASC) 10 mg tablet TAKE 1 TABLET BY MOUTH  DAILY 90 Tab 3    SITagliptin (Januvia) 100 mg tablet TAKE 1 TABLET BY MOUTH  DAILY 90 Tab 3    ammonium lactate (LAC-HYDRIN) 12 % lotion rub in to affected area on legs BID 3 Bottle 1    metoprolol succinate (TOPROL-XL) 200 mg XL tablet TAKE 1 TABLET BY MOUTH  DAILY 90 Tab 3    omeprazole (PRILOSEC) 20 mg capsule TAKE 1 CAPSULE BY MOUTH  DAILY 90 Cap 3    raloxifene (EVISTA) 60 mg tablet TAKE 1 TABLET BY MOUTH EVERY DAY 60 Tab 0    BD Ultra-Fine Mini Pen Needle 31 gauge x 3/16\" ndle USE TO INJECT INSULIN 100 Pen Needle 3    citalopram (CELEXA) 20 mg tablet TAKE 1 TABLET BY MOUTH  DAILY 31 Tab 10    atorvastatin (LIPITOR) 20 mg tablet TAKE 1 TABLET BY MOUTH  NIGHTLY 65 Tab 4    glipiZIDE (GLUCOTROL) 10 mg tablet TAKE 1 TABLET BY MOUTH TWO  TIMES DAILY 180 Tab 1    lancets misc One Touch Del Plus Lancets. Use to check BS 2 x a day 100 Each 11    OTHER One touch verio glucometer 1 Units 0    OTHER One touch verio test strips, monitor BS 2 x a day 50 Strip 11    calcium citrate-vitamin D3 (CITRACAL WITH VITAMIN D MAXIMUM) tablet Take 1 Tab by mouth daily. 30 Tab 5    flash glucose sensor (FREESTYLE TORSTEN 14 DAY SENSOR) kit 1 Units by Does Not Apply route every fourteen (14) days. 2 Kit 11    flash glucose scanning reader (FREESTYLE TORSTEN 14 DAY READER) misc 1 Units by Does Not Apply route daily. 1 Each 0    OTHER lantus solostar insulin U-100. 20 units at bedtime.  cinnamon bark (CINNAMON PO) Take 1,000 mg by mouth daily as needed (takes for  or greater).  SCHISANDRA PO Take  by mouth. schisandra extract 500 mg po once daily.  melatonin 3 mg tablet Take 3 mg by mouth nightly as needed.  Blood-Glucose Meter monitoring kit Use to check BS 2 x a day 1 Kit 0    aspirin 81 mg chewable tablet Take 81 mg by mouth daily. Exam:  NEUROLOGICAL EXAM:  General: Awake, alert, speech fluent  CN: PERRL, EOMI without nystagmus, VFF to confrontation, facial sensation and strength are normal and symmetric, hearing is intact to finger rub bilaterally, palate and tongue movements are intact and symmetric. Motor: Normal tone, bulk and strength bilaterally. 5-/5 throughout limited due to pain from arthritis.    Reflexes: 2/4 and symmetric   Coordination: FNF, ALONDRA, HTS intact. Sensation: LT intact throughout except for distal RLE  Gait: Normal-based and steady. LABS  Results for orders placed or performed in visit on 06/22/20   CBC WITH AUTOMATED DIFF   Result Value Ref Range    WBC 3.8 3.4 - 10.8 x10E3/uL    RBC 5.24 3.77 - 5.28 x10E6/uL    HGB 13.2 11.1 - 15.9 g/dL    HCT 41.8 34.0 - 46.6 %    MCV 80 79 - 97 fL    MCH 25.2 (L) 26.6 - 33.0 pg    MCHC 31.6 31.5 - 35.7 g/dL    RDW 13.0 11.7 - 15.4 %    PLATELET 968 388 - 631 x10E3/uL    NEUTROPHILS 49 Not Estab. %    Lymphocytes 39 Not Estab. %    MONOCYTES 9 Not Estab. %    EOSINOPHILS 2 Not Estab. %    BASOPHILS 1 Not Estab. %    ABS. NEUTROPHILS 1.9 1.4 - 7.0 x10E3/uL    Abs Lymphocytes 1.5 0.7 - 3.1 x10E3/uL    ABS. MONOCYTES 0.3 0.1 - 0.9 x10E3/uL    ABS. EOSINOPHILS 0.1 0.0 - 0.4 x10E3/uL    ABS. BASOPHILS 0.0 0.0 - 0.2 x10E3/uL    IMMATURE GRANULOCYTES 0 Not Estab. %    ABS. IMM. GRANS. 0.0 0.0 - 0.1 [id number]   METABOLIC PANEL, COMPREHENSIVE   Result Value Ref Range    Glucose 150 (H) 65 - 99 mg/dL    BUN 12 8 - 27 mg/dL    Creatinine 0.84 0.57 - 1.00 mg/dL    GFR est non-AA 72 >59 mL/min/1.73    GFR est AA 83 >59 mL/min/1.73    BUN/Creatinine ratio 14 12 - 28    Sodium 143 134 - 144 mmol/L    Potassium 4.1 3.5 - 5.2 mmol/L    Chloride 102 96 - 106 mmol/L    CO2 25 20 - 29 mmol/L    Calcium 9.6 8.7 - 10.3 mg/dL    Protein, total 7.4 6.0 - 8.5 g/dL    Albumin 4.6 3.8 - 4.8 g/dL    GLOBULIN, TOTAL 2.8 1.5 - 4.5 g/dL    A-G Ratio 1.6 1.2 - 2.2    Bilirubin, total 0.2 0.0 - 1.2 mg/dL    Alk. phosphatase 96 39 - 117 IU/L    AST (SGOT) 22 0 - 40 IU/L    ALT (SGPT) 21 0 - 32 IU/L       IMAGING:  MRI Results (most recent):  Results from Hospital Encounter encounter on 07/10/20    MRI BRAIN W WO CONT    Narrative  EXAM: MRI BRAIN W WO CONT    TECHNIQUE: Sagittal and axial T1-weighted images axial FLAIR, T2-weighted,  diffusion weighted, gradient echo,  sagittal T2 FLAIR images.  Axial and coronal  postcontrast T1-weighted images. Pre and postcontrast imaging of the brain was  performed. The study was performed on the 0.7 Jolene open MRI unit. IV CONTRAST:  15 cc Dotarem    INDICATION:  eval for interval progression after transition of DMT    COMPARISON:  MRI of 12/29/2019    FINDINGS:  BRAIN PARENCHYMA:  No change in the confluent and scattered foci of FLAIR/T2  hyperintensity in the cerebral white matter and patchy T2 hyperintensity in the  catia. No new lesions or enlarging lesions. No abnormal enhancement. No diffusion  restriction. INTRACRANIAL HEMORRHAGE: None. CSF SPACES:  Normal in size and morphology for the patient's age. BASAL CISTERNS:  Patent. MIDLINE SHIFT: None. VASCULAR SYSTEM:  Normal flow voids. PARANASAL SINUSES AND MASTOID AIR CELLS:  No significant opacification. VISUALIZED ORBITS:  No significant abnormalities. VISUALIZED UPPER CERVICAL SPINE:  No significant abnormalities. SELLA:  No enlargement or  focal abnormality. SKULL BASE:  No significant abnormalities. Cerebellar tonsils in normal  position. CALVARIUM:  Intact. Impression  IMPRESSION:  No change since the previous study of 12/29/2019. Moderately  extensive white matter disease, consistent with the patient's diagnosis of  multiple sclerosis. There may also be a contribution from chronic small vessel  ischemic changes. Assessment:     Encounter Diagnoses     ICD-10-CM ICD-9-CM   1. MS (multiple sclerosis) ([facility] [address]  G35 340   2. Episodic tension-type headache, not intractable  G44.219 339.11   3. Arthralgia, unspecified joint  M25.50 719.40   Pleasant 78 y.o. RHAAF h/o DM, HTN, HCV, depression and remote stroke here for f/u of MS dx 1999 (prior optic neuritis b/l, L paresthesias). Clinically, she appears stable without significant focal deficits. Denies recent exacerbations. Prior MRI Brain from 2014 independently reviewed and c/w MS with moderate periventricular T2 hyperintensities. No evidence of cervical lesions on MRI C spine. MRI Brain obtained 12/29/19 showing possible slight increase in overall MS lesion burden without active demyelination. Due to radiographic progression and patient's injection fatigue, she was transitioned to Vumerity. She appears to be doing well on current therapy. Will obtain updated imaging to evaluate for efficacy of multiple sclerosis disease modifying therapy. Disease activity in MS is often not immediately detectable on history or examination, but is sensitively identified on MRI. If identified, new or active MS lesions on MRI may represent suboptimal response to MS therapy and would change medical management. We reviewed the multiple sclerosis diagnosis, prognosis, and implications. We reviewed the 3-pronged treatment strategy: treating acute flares, using preventative treatments to prevent future relapses and brain lesions, and treating residual symptoms. For long-term treatment, I recommend continuation of Vumerity. Plan:   Continue Vumerity  Repeat MRI Brain WWO  CBC w/diff, CMP, ESR  Cont. Vit D supplementation  Referral to Rheumatology for arthralgias/RA     Follow-up and Dispositions    · Return in about 6 months (around 12/2/2021).          Signed:  Alexia Hutchinson DO  6/2/2021

## 2021-06-03 LAB
ALBUMIN SERPL-MCNC: 4.6 G/DL (ref 3.8–4.8)
ALBUMIN/GLOB SERPL: 2 {RATIO} (ref 1.2–2.2)
ALP SERPL-CCNC: 106 IU/L (ref 48–121)
ALT SERPL-CCNC: 21 IU/L (ref 0–32)
AST SERPL-CCNC: 22 IU/L (ref 0–40)
BASOPHILS # BLD AUTO: 0 X10E3/UL (ref 0–0.2)
BASOPHILS NFR BLD AUTO: 1 %
BILIRUB SERPL-MCNC: 0.4 MG/DL (ref 0–1.2)
BUN SERPL-MCNC: 14 MG/DL (ref 8–27)
BUN/CREAT SERPL: 16 (ref 12–28)
CALCIUM SERPL-MCNC: 9.2 MG/DL (ref 8.7–10.3)
CHLORIDE SERPL-SCNC: 99 MMOL/L (ref 96–106)
CO2 SERPL-SCNC: 25 MMOL/L (ref 20–29)
CREAT SERPL-MCNC: 0.9 MG/DL (ref 0.57–1)
EOSINOPHIL # BLD AUTO: 0.1 X10E3/UL (ref 0–0.4)
EOSINOPHIL NFR BLD AUTO: 2 %
ERYTHROCYTE [DISTWIDTH] IN BLOOD BY AUTOMATED COUNT: 13 % (ref 11.7–15.4)
ERYTHROCYTE [SEDIMENTATION RATE] IN BLOOD BY WESTERGREN METHOD: 11 MM/HR (ref 0–40)
GLOBULIN SER CALC-MCNC: 2.3 G/DL (ref 1.5–4.5)
GLUCOSE SERPL-MCNC: 145 MG/DL (ref 65–99)
HCT VFR BLD AUTO: 41.8 % (ref 34–46.6)
HGB BLD-MCNC: 13.5 G/DL (ref 11.1–15.9)
IMM GRANULOCYTES # BLD AUTO: 0 X10E3/UL (ref 0–0.1)
IMM GRANULOCYTES NFR BLD AUTO: 1 %
LYMPHOCYTES # BLD AUTO: 0.9 X10E3/UL (ref 0.7–3.1)
LYMPHOCYTES NFR BLD AUTO: 24 %
MCH RBC QN AUTO: 26.5 PG (ref 26.6–33)
MCHC RBC AUTO-ENTMCNC: 32.3 G/DL (ref 31.5–35.7)
MCV RBC AUTO: 82 FL (ref 79–97)
MONOCYTES # BLD AUTO: 0.5 X10E3/UL (ref 0.1–0.9)
MONOCYTES NFR BLD AUTO: 12 %
NEUTROPHILS # BLD AUTO: 2.3 X10E3/UL (ref 1.4–7)
NEUTROPHILS NFR BLD AUTO: 60 %
PLATELET # BLD AUTO: 273 X10E3/UL (ref 150–450)
POTASSIUM SERPL-SCNC: 4.3 MMOL/L (ref 3.5–5.2)
PROT SERPL-MCNC: 6.9 G/DL (ref 6–8.5)
RBC # BLD AUTO: 5.1 X10E6/UL (ref 3.77–5.28)
SODIUM SERPL-SCNC: 139 MMOL/L (ref 134–144)
WBC # BLD AUTO: 3.9 X10E3/UL (ref 3.4–10.8)

## 2021-06-03 RX ORDER — LORAZEPAM 0.5 MG/1
TABLET ORAL
Qty: 2 TABLET | Refills: 0 | Status: SHIPPED | OUTPATIENT
Start: 2021-06-03

## 2021-06-04 ENCOUNTER — TELEPHONE (OUTPATIENT)
Dept: NEUROLOGY | Age: 71
End: 2021-06-04

## 2021-06-04 NOTE — TELEPHONE ENCOUNTER
Spoke with patient, informed her per -  Labs reviewed and look good except for elevated glucose. Recommend PCP follow up.     She verbalized understanding, will follow up with PCP

## 2021-06-28 RX ORDER — CITALOPRAM 20 MG/1
TABLET, FILM COATED ORAL
Qty: 31 TABLET | Refills: 10 | Status: SHIPPED | OUTPATIENT
Start: 2021-06-28 | End: 2022-02-24

## 2021-08-30 NOTE — TELEPHONE ENCOUNTER
Called and advised of the below.  He verbalized understanding.    Called pt and LM requesting her to return my call.

## 2021-11-30 DIAGNOSIS — Z79.4 TYPE 2 DIABETES MELLITUS WITH DIABETIC NEPHROPATHY, WITH LONG-TERM CURRENT USE OF INSULIN (HCC): Primary | ICD-10-CM

## 2021-11-30 DIAGNOSIS — E11.21 TYPE 2 DIABETES MELLITUS WITH DIABETIC NEPHROPATHY, WITH LONG-TERM CURRENT USE OF INSULIN (HCC): Primary | ICD-10-CM

## 2021-12-01 RX ORDER — PEN NEEDLE, DIABETIC 31 GX3/16"
NEEDLE, DISPOSABLE MISCELLANEOUS
Qty: 100 PEN NEEDLE | Refills: 3 | Status: SHIPPED | OUTPATIENT
Start: 2021-12-01

## 2021-12-02 ENCOUNTER — TELEPHONE (OUTPATIENT)
Dept: INTERNAL MEDICINE CLINIC | Age: 71
End: 2021-12-02

## 2021-12-02 DIAGNOSIS — L85.3 XEROSIS OF SKIN: Primary | ICD-10-CM

## 2021-12-13 RX ORDER — OMEPRAZOLE 20 MG/1
CAPSULE, DELAYED RELEASE ORAL
Qty: 90 CAPSULE | Refills: 3 | Status: SHIPPED | OUTPATIENT
Start: 2021-12-13

## 2021-12-13 RX ORDER — METOPROLOL SUCCINATE 200 MG/1
TABLET, EXTENDED RELEASE ORAL
Qty: 90 TABLET | Refills: 3 | Status: SHIPPED | OUTPATIENT
Start: 2021-12-13

## 2022-01-19 ENCOUNTER — TELEPHONE (OUTPATIENT)
Dept: NEUROLOGY | Age: 72
End: 2022-01-19

## 2022-01-19 NOTE — TELEPHONE ENCOUNTER
Re: Aurora Raymundo  PA request, expires soon. [facility] Key# [id number]    Submitted and awaiting update.

## 2022-02-24 RX ORDER — CITALOPRAM 20 MG/1
TABLET, FILM COATED ORAL
Qty: 31 TABLET | Refills: 10 | Status: SHIPPED | OUTPATIENT
Start: 2022-02-24

## 2022-03-09 ENCOUNTER — OFFICE VISIT (OUTPATIENT)
Dept: INTERNAL MEDICINE CLINIC | Age: 72
End: 2022-03-09
Payer: MEDICAID

## 2022-03-09 VITALS
DIASTOLIC BLOOD PRESSURE: 80 MMHG | HEIGHT: 63 IN | TEMPERATURE: 97.8 F | BODY MASS INDEX: 29.41 KG/M2 | OXYGEN SATURATION: 97 % | WEIGHT: 166 LBS | RESPIRATION RATE: 16 BRPM | SYSTOLIC BLOOD PRESSURE: 132 MMHG | HEART RATE: 60 BPM

## 2022-03-09 DIAGNOSIS — E66.3 OVERWEIGHT (BMI 25.0-29.9): ICD-10-CM

## 2022-03-09 DIAGNOSIS — G35 MS (MULTIPLE SCLEROSIS) (HCC): ICD-10-CM

## 2022-03-09 DIAGNOSIS — E11.21 TYPE 2 DIABETES MELLITUS WITH DIABETIC NEPHROPATHY, WITH LONG-TERM CURRENT USE OF INSULIN (HCC): Primary | ICD-10-CM

## 2022-03-09 DIAGNOSIS — M05.79 RHEUMATOID ARTHRITIS INVOLVING MULTIPLE SITES WITH POSITIVE RHEUMATOID FACTOR (HCC): ICD-10-CM

## 2022-03-09 DIAGNOSIS — Z79.4 TYPE 2 DIABETES MELLITUS WITH DIABETIC NEPHROPATHY, WITH LONG-TERM CURRENT USE OF INSULIN (HCC): Primary | ICD-10-CM

## 2022-03-09 DIAGNOSIS — B18.2 HEP C W/O COMA, CHRONIC (HCC): ICD-10-CM

## 2022-03-09 DIAGNOSIS — K21.9 GASTROESOPHAGEAL REFLUX DISEASE WITHOUT ESOPHAGITIS: ICD-10-CM

## 2022-03-09 DIAGNOSIS — F33.42 RECURRENT MAJOR DEPRESSIVE DISORDER, IN FULL REMISSION (HCC): ICD-10-CM

## 2022-03-09 DIAGNOSIS — I10 ESSENTIAL HYPERTENSION: ICD-10-CM

## 2022-03-09 DIAGNOSIS — Z12.31 ENCOUNTER FOR SCREENING MAMMOGRAM FOR BREAST CANCER: ICD-10-CM

## 2022-03-09 PROCEDURE — 99214 OFFICE O/P EST MOD 30 MIN: CPT | Performed by: INTERNAL MEDICINE

## 2022-03-09 NOTE — PROGRESS NOTES
Health Maintenance Due   Topic Date Due    Eye Exam Retinal or Dilated  Never done    DTaP/Tdap/Td series (1 - Tdap) Never done    Colorectal Cancer Screening Combo  Never done    MICROALBUMIN Q1  01/09/2021    Lipid Screen  01/09/2021    Breast Cancer Screen Mammogram  02/20/2021    COVID-19 Vaccine (3 - Booster for Moderna series) 08/02/2021    Flu Vaccine (1) 09/01/2021    Foot Exam Q1  09/22/2021    Depression Monitoring  12/18/2021       Chief Complaint   Patient presents with    Diabetes    Depression    Anxiety    Pain (Chronic)     Sharp, piercing pain, right side of face and ear       1. Have you been to the ER, urgent care clinic since your last visit? Hospitalized since your last visit? No    2. Have you seen or consulted any other health care providers outside of the [address] [place] [address] since your last visit? Include any pap smears or colon screening. No    3) Do you have an Advance Directive on file? no    4) Are you interested in receiving information on Advance Directives? NO      Patient is accompanied by self I have received verbal consent from [facility] to discuss any/all medical information while they are present in the room.

## 2022-03-10 LAB
ALBUMIN SERPL-MCNC: 4.6 G/DL (ref 3.8–4.8)
ALBUMIN/CREAT UR: 161 MG/G CREAT (ref 0–29)
ALBUMIN/GLOB SERPL: 1.6 {RATIO} (ref 1.2–2.2)
ALP SERPL-CCNC: 120 IU/L (ref 44–121)
ALT SERPL-CCNC: 26 IU/L (ref 0–32)
AST SERPL-CCNC: 26 IU/L (ref 0–40)
BILIRUB SERPL-MCNC: 0.3 MG/DL (ref 0–1.2)
BUN SERPL-MCNC: 14 MG/DL (ref 8–27)
BUN/CREAT SERPL: 16 (ref 12–28)
CALCIUM SERPL-MCNC: 9.5 MG/DL (ref 8.7–10.3)
CHLORIDE SERPL-SCNC: 103 MMOL/L (ref 96–106)
CHOLEST SERPL-MCNC: 176 MG/DL (ref 100–199)
CO2 SERPL-SCNC: 23 MMOL/L (ref 20–29)
CREAT SERPL-MCNC: 0.85 MG/DL (ref 0.57–1)
CREAT UR-MCNC: 292 MG/DL
EGFR: 74 ML/MIN/1.73
ERYTHROCYTE [DISTWIDTH] IN BLOOD BY AUTOMATED COUNT: 13 % (ref 11.7–15.4)
EST. AVERAGE GLUCOSE BLD GHB EST-MCNC: 183 MG/DL
GLOBULIN SER CALC-MCNC: 2.9 G/DL (ref 1.5–4.5)
GLUCOSE SERPL-MCNC: 143 MG/DL (ref 65–99)
HBA1C MFR BLD: 8 % (ref 4.8–5.6)
HCT VFR BLD AUTO: 43 % (ref 34–46.6)
HDLC SERPL-MCNC: 49 MG/DL
HGB BLD-MCNC: 13.6 G/DL (ref 11.1–15.9)
IMP & REVIEW OF LAB RESULTS: NORMAL
INTERPRETATION: NORMAL
LDLC SERPL CALC-MCNC: 99 MG/DL (ref 0–99)
MCH RBC QN AUTO: 26.2 PG (ref 26.6–33)
MCHC RBC AUTO-ENTMCNC: 31.6 G/DL (ref 31.5–35.7)
MCV RBC AUTO: 83 FL (ref 79–97)
MICROALBUMIN UR-MCNC: 469.2 UG/ML
PLATELET # BLD AUTO: 258 X10E3/UL (ref 150–450)
POTASSIUM SERPL-SCNC: 5 MMOL/L (ref 3.5–5.2)
PROT SERPL-MCNC: 7.5 G/DL (ref 6–8.5)
RBC # BLD AUTO: 5.2 X10E6/UL (ref 3.77–5.28)
SODIUM SERPL-SCNC: 146 MMOL/L (ref 134–144)
TRIGL SERPL-MCNC: 161 MG/DL (ref 0–149)
VLDLC SERPL CALC-MCNC: 28 MG/DL (ref 5–40)
WBC # BLD AUTO: 4.4 X10E3/UL (ref 3.4–10.8)

## 2022-03-12 NOTE — PROGRESS NOTES
Sodium mildly elevated. Encourage oral water intake, as tolerated. Triglycerides elevated. HgbA1c 8.0. Watch diet for foods high in sugar and carbohydrates. Recommend regular exercise, as tolerated.

## 2022-03-18 ENCOUNTER — TELEPHONE (OUTPATIENT)
Dept: INTERNAL MEDICINE CLINIC | Age: 72
End: 2022-03-18

## 2022-03-18 PROBLEM — R07.9 CHEST PAIN: Status: ACTIVE | Noted: 2017-12-29

## 2022-03-18 PROBLEM — F51.01 PRIMARY INSOMNIA: Status: ACTIVE | Noted: 2017-07-14

## 2022-03-18 NOTE — TELEPHONE ENCOUNTER
----- Message from Marta Archibald sent at 3/18/2022  3:32 PM EDT -----  Subject: Results Request    QUESTIONS  Which lab or imaging result is the patient calling about?   kidney/liver/cholesterol/diabetes  Which provider ordered the test? Adriel Garcia   At what location was the test performed? [facility]  Date the test was performed? 2022-03-09  Additional Information for Provider? patient is requesting lab results  ---------------------------------------------------------------------------  --------------  [address]  What is the best way for the office to contact you? OK to leave message on   voicemail  Preferred Call Back Phone Number?  [phone]

## 2022-03-19 PROBLEM — K21.9 GASTROESOPHAGEAL REFLUX DISEASE WITHOUT ESOPHAGITIS: Status: ACTIVE | Noted: 2022-03-09

## 2022-03-19 PROBLEM — J30.2 SEASONAL ALLERGIC RHINITIS: Status: ACTIVE | Noted: 2018-05-14

## 2022-03-19 PROBLEM — M79.642 BILATERAL HAND PAIN: Status: ACTIVE | Noted: 2021-05-13

## 2022-03-19 PROBLEM — M79.641 BILATERAL HAND PAIN: Status: ACTIVE | Noted: 2021-05-13

## 2022-03-19 PROBLEM — E11.21 TYPE 2 DIABETES WITH NEPHROPATHY (HCC): Status: ACTIVE | Noted: 2020-02-17

## 2022-03-19 PROBLEM — R06.09 DOE (DYSPNEA ON EXERTION): Status: ACTIVE | Noted: 2018-05-14

## 2022-03-20 PROBLEM — Z79.4 TYPE 2 DIABETES MELLITUS WITHOUT COMPLICATION, WITH LONG-TERM CURRENT USE OF INSULIN (HCC): Status: ACTIVE | Noted: 2019-02-25

## 2022-03-20 PROBLEM — E11.9 TYPE 2 DIABETES MELLITUS WITHOUT COMPLICATION, WITH LONG-TERM CURRENT USE OF INSULIN (HCC): Status: ACTIVE | Noted: 2019-02-25

## 2022-03-20 PROBLEM — E66.3 OVERWEIGHT (BMI 25.0-29.9): Status: ACTIVE | Noted: 2022-03-09

## 2022-03-29 NOTE — PROGRESS NOTES
HISTORY OF PRESENT ILLNESS  Peterson Chou is a 73 y.o. female. She is here for follow-up. PMH includes DM, HTN, MS, depression, RA, GERD. Vital signs are stable. BMI is 29.4. Reports sugars running in mid 100s. Last A1c was 8.1. Has diabetic nephropathy and neuropathy. Denies vision or skin issues. Followed by neurology for MS. denies new neurological issues. Slow gait but no falls. History of RA. Has not seen a rheumatologist in a while. Pains have been relatively stable. She also has been followed by hepatology for hepatitis C. Taking precautions for Covid 19. Denies any related signs or symptoms including fever, cough, SOB or CP. PMH/PSH/Allergies/Social History/medication list and most recent studies reviewed with patient. Tobacco use: No  Alcohol use: No  Reports compliance with medications and diet. Trying to be active physically as tolerated. Reports no other new c/o. HPI    Review of Systems   Constitutional: Negative. HENT: Negative. Eyes: Negative for blurred vision. Respiratory: Negative for shortness of breath. Cardiovascular: Negative for chest pain and leg swelling. Gastrointestinal: Positive for heartburn. Negative for abdominal pain. Genitourinary: Negative for dysuria. Musculoskeletal: Positive for joint pain. Negative for falls. Skin: Negative. Neurological: Positive for tingling and sensory change. Negative for focal weakness. Endo/Heme/Allergies: Negative for polydipsia. Psychiatric/Behavioral: Positive for depression. The patient is nervous/anxious and has insomnia. All other systems reviewed and are negative. Physical Exam  Vitals and nursing note reviewed. Constitutional:       General: She is not in acute distress. Appearance: She is well-developed. Comments: Pleasant lady, overweight   HENT:      Head: Normocephalic and atraumatic.       Mouth/Throat:      Mouth: Mucous membranes are moist.   Eyes:      General: No scleral icterus. Conjunctiva/sclera: Conjunctivae normal.   Neck:      Thyroid: No thyromegaly. Vascular: No carotid bruit or JVD. Cardiovascular:      Rate and Rhythm: Normal rate and regular rhythm. Heart sounds: Normal heart sounds. No murmur heard. Pulmonary:      Effort: Pulmonary effort is normal. No respiratory distress. Breath sounds: Normal breath sounds. No wheezing or rales. Abdominal:      General: Bowel sounds are normal. There is no distension. Palpations: Abdomen is soft. Tenderness: There is no abdominal tenderness. There is no right CVA tenderness or left CVA tenderness. Comments: obese   Musculoskeletal:         General: Tenderness (lumbars/knees/hands w/o clear RA changes,,, ) present. Cervical back: Normal range of motion and neck supple. Right lower leg: No edema. Left lower leg: No edema. Comments: djd   Skin:     General: Skin is warm and dry. Findings: No rash. Neurological:      Mental Status: She is alert and oriented to person, place, and time. Mental status is at baseline. Coordination: Coordination normal.      Gait: Gait normal.   Psychiatric:         Behavior: Behavior normal.      Comments: Chronically depressed           ASSESSMENT and PLAN  Diagnoses and all orders for this visit:    1. Type 2 diabetes mellitus with diabetic nephropathy, with long-term current use of insulin (HCC)  -     LIPID PANEL; Future  -     METABOLIC PANEL, COMPREHENSIVE; Future  -     MICROALBUMIN, UR, RAND W/ MICROALB/CREAT RATIO; Future  -     CBC W/O DIFF; Future  -     HEMOGLOBIN A1C WITH EAG; Future    2. MS (multiple sclerosis) (HCC)  Seems stable. Follow-up with neuro as scheduled  3. Rheumatoid arthritis involving multiple sites with positive rheumatoid factor (HCC)  Stable. Follow-up with rheumatologist as scheduled  4. Recurrent major depressive disorder, in full remission ([facility] [address]  Stable chronic condition.   Continue current treatment/medications. 5. Hep C w/o coma, chronic (HCC)  Stable. See hematologist as scheduled  6. Essential hypertension  Stable chronic condition. Continue current treatment/medications. 7. Gastroesophageal reflux disease without esophagitis    8. Overweight (BMI 25.0-29. 9)  Advised patient to lose weight by watching diet (decreasing sugars/carbs/fat, increasing fruits/vegetables), exercising at least 30 minutes daily, getting 7-8 hours of sleep daily, drinking plenty of water, and decreasing stress    9. Encounter for screening mammogram for breast cancer  -     [facility] MAMMO BI SCREENING INCL CAD; Future    Other orders  -     METABOLIC PANEL, COMPREHENSIVE  -     CBC W/O DIFF  -     LIPID PANEL  -     MICROALBUMIN, UR, RAND W/ MICROALB/CREAT RATIO  -     CVD REPORT  -     CKD REPORT  -     HEMOGLOBIN A1C WITH EAG      Follow-up and Dispositions    · Return in about 6 months (around 9/9/2022). All chronic medical problems are stable  Continue with current medical management and plan  lab results and schedule of future lab studies reviewed with patient  reviewed diet, exercise and weight control  reviewed medications and side effects in detail  F/u with other MD's/ providers as scheduled  COVID-19 precautions discussed with pt  An After Visit Summary was printed and given to the patient.

## 2022-03-31 ENCOUNTER — TELEPHONE (OUTPATIENT)
Dept: NEUROLOGY | Age: 72
End: 2022-03-31

## 2022-03-31 ENCOUNTER — HOSPITAL ENCOUNTER (OUTPATIENT)
Dept: MAMMOGRAPHY | Age: 72
Discharge: HOME | End: 2022-03-31
Attending: INTERNAL MEDICINE
Payer: MEDICAID

## 2022-03-31 DIAGNOSIS — Z12.31 ENCOUNTER FOR SCREENING MAMMOGRAM FOR BREAST CANCER: ICD-10-CM

## 2022-03-31 PROCEDURE — 77067 SCR MAMMO BI INCL CAD: CPT

## 2022-03-31 NOTE — TELEPHONE ENCOUNTER
French Griffin called to let  know that the order for Pt's MRI of brain expires today. [facility] needs the case to be withdrawn. Authorization number: [id number]  French Griffin requested that he be notified once this is done so that he may begin to start a new authorization.

## 2022-04-19 NOTE — TELEPHONE ENCOUNTER
"Subjective     Sincere Bruce Aviles is a 13 m.o. male who presents with Cough (X 4 days with congestion, diarrhea and fever (101.6.))    No past medical history on file.  Social History     Other Topics Concern   • Not on file   Social History Narrative   • Not on file     Social Determinants of Health     Physical Activity: Not on file   Stress: Not on file   Social Connections: Not on file   Intimate Partner Violence: Not on file   Housing Stability: Not on file     Family History   Problem Relation Age of Onset   • Heart Disease Maternal Grandmother         Copied from mother's family history at birth   • Thyroid Maternal Grandfather         Copied from mother's family history at birth       Allergies: Patient has no known allergies.    Patient is a 13-month-old male brought in today by  with complaint of nasal congestion, and cough.  Symptoms started over the last week.  No vomiting or diarrhea.  Appetite has been decreased but patient has been drinking fluids when encouraged and is creating urine output.          Cough  This is a new problem. The current episode started in the past 7 days. The problem occurs intermittently. The problem has been unchanged. Associated symptoms include chills, congestion and coughing. Nothing aggravates the symptoms. He has tried nothing for the symptoms. The treatment provided no relief.       Review of Systems   Constitutional: Positive for chills and malaise/fatigue.   HENT: Positive for congestion.    Respiratory: Positive for cough.    All other systems reviewed and are negative.             Objective     Pulse 106   Temp 36.6 °C (97.8 °F) (Temporal)   Resp 36   Ht 0.76 m (2' 5.92\")   Wt 10.3 kg (22 lb 12.8 oz)   SpO2 99%   BMI 17.91 kg/m²      Physical Exam  Vitals reviewed.   Constitutional:       General: He is active.      Appearance: Normal appearance. He is well-developed.      Comments: Patient is awake and alert.  Nontoxic in appearance.   HENT:      " Lov: 5/13/21  Nov: 1/4/22    Pharmacy notes pt needs this asap Head: Normocephalic and atraumatic.      Right Ear: Tympanic membrane, ear canal and external ear normal.      Left Ear: Tympanic membrane, ear canal and external ear normal.      Nose: Nose normal.      Mouth/Throat:      Mouth: Mucous membranes are moist.   Eyes:      Extraocular Movements: Extraocular movements intact.      Conjunctiva/sclera: Conjunctivae normal.      Pupils: Pupils are equal, round, and reactive to light.   Cardiovascular:      Rate and Rhythm: Normal rate and regular rhythm.      Heart sounds: Normal heart sounds.   Pulmonary:      Effort: Pulmonary effort is normal. No respiratory distress, nasal flaring or retractions.      Breath sounds: Normal breath sounds. No stridor or decreased air movement. No wheezing, rhonchi or rales.   Musculoskeletal:         General: Normal range of motion.      Cervical back: Normal range of motion and neck supple.   Skin:     General: Skin is warm.      Capillary Refill: Capillary refill takes less than 2 seconds.   Neurological:      Mental Status: He is alert and oriented for age.          poct covid: Negative    poct influenza: Negative        Teaching done regarding signs and symptoms of respiratory distress including nasal flaring, intercostal retractions, grunting           Assessment & Plan   URI, Viral  Cough  Fever    Bulb suction the nares  Humidifier  Children Zyrtec as needed  ER precautions for respiratory distress  Follow-up otherwise for any further questions or concerns       There are no diagnoses linked to this encounter.

## 2022-04-22 ENCOUNTER — TELEPHONE (OUTPATIENT)
Dept: NEUROLOGY | Age: 72
End: 2022-04-22

## 2022-04-22 RX ORDER — MECLIZINE HYDROCHLORIDE 25 MG/1
25 TABLET ORAL
Qty: 30 TABLET | Refills: 0 | Status: CANCELLED | OUTPATIENT
Start: 2022-04-22 | End: 2022-05-02

## 2022-05-14 LAB — SARS-COV-2, NAA: NEGATIVE

## 2022-05-16 RX ORDER — CLONIDINE HYDROCHLORIDE 0.1 MG/1
TABLET ORAL
Qty: 180 TABLET | Refills: 3 | Status: SHIPPED | OUTPATIENT
Start: 2022-05-16

## 2022-06-11 DIAGNOSIS — M81.6 LOCALIZED OSTEOPOROSIS, UNSPECIFIED PATHOLOGICAL FRACTURE PRESENCE: ICD-10-CM

## 2022-06-13 RX ORDER — RALOXIFENE HYDROCHLORIDE 60 MG/1
TABLET, FILM COATED ORAL
Qty: 60 TABLET | Refills: 0 | Status: SHIPPED | OUTPATIENT
Start: 2022-06-13

## 2022-06-17 RX ORDER — INSULIN GLARGINE 100 [IU]/ML
INJECTION, SOLUTION SUBCUTANEOUS
Qty: 15 ML | Refills: 11 | Status: SHIPPED | OUTPATIENT
Start: 2022-06-17

## 2022-06-22 ENCOUNTER — TELEPHONE (OUTPATIENT)
Dept: NEUROLOGY | Age: 72
End: 2022-06-22

## 2022-06-22 NOTE — TELEPHONE ENCOUNTER
Patient would like to know if she needs a referral from Dr. Tiffani Stevens to have her MRI done. I gave patient the scheduling number for the MRI clinic.     Please contact

## 2022-06-23 DIAGNOSIS — G35 MS (MULTIPLE SCLEROSIS) (HCC): Primary | ICD-10-CM

## 2022-06-24 NOTE — TELEPHONE ENCOUNTER
Called the PT to inform that Dr. Valentina Palacios put in a new order for MRI spoke to her on the phone.

## 2022-07-19 RX ORDER — AMMONIUM LACTATE 12 G/100G
LOTION TOPICAL
Qty: 675 G | Refills: 1 | Status: SHIPPED | OUTPATIENT
Start: 2022-07-19

## 2022-11-10 DIAGNOSIS — E11.21 TYPE 2 DIABETES MELLITUS WITH DIABETIC NEPHROPATHY, WITH LONG-TERM CURRENT USE OF INSULIN (HCC): ICD-10-CM

## 2022-11-10 DIAGNOSIS — Z79.4 TYPE 2 DIABETES MELLITUS WITH DIABETIC NEPHROPATHY, WITH LONG-TERM CURRENT USE OF INSULIN (HCC): ICD-10-CM

## 2022-11-14 RX ORDER — METOPROLOL SUCCINATE 200 MG/1
TABLET, EXTENDED RELEASE ORAL
Qty: 90 TABLET | Refills: 3 | Status: SHIPPED | OUTPATIENT
Start: 2022-11-14

## 2022-12-04 RX ORDER — CITALOPRAM 20 MG/1
TABLET, FILM COATED ORAL
Qty: 31 TABLET | Refills: 10 | Status: SHIPPED | OUTPATIENT
Start: 2022-12-04

## 2022-12-07 DIAGNOSIS — E11.9 TYPE 2 DIABETES MELLITUS WITHOUT COMPLICATION, WITH LONG-TERM CURRENT USE OF INSULIN (HCC): Primary | ICD-10-CM

## 2022-12-07 DIAGNOSIS — Z79.4 TYPE 2 DIABETES MELLITUS WITHOUT COMPLICATION, WITH LONG-TERM CURRENT USE OF INSULIN (HCC): Primary | ICD-10-CM

## 2022-12-07 RX ORDER — AMLODIPINE BESYLATE 10 MG/1
10 TABLET ORAL DAILY
Qty: 90 TABLET | Refills: 0 | Status: SHIPPED | OUTPATIENT
Start: 2022-12-07

## 2022-12-07 RX ORDER — ATORVASTATIN CALCIUM 20 MG/1
TABLET, FILM COATED ORAL
Qty: 90 TABLET | Refills: 0 | Status: SHIPPED | OUTPATIENT
Start: 2022-12-07

## 2022-12-09 NOTE — TELEPHONE ENCOUNTER
QUESTIONS   Name of Medication? metoprolol succinate (TOPROL-XL) 200 mg XL tablet   Patient-reported dosage and instructions? once a day   How many days do you have left? 0   Preferred Pharmacy? [facility] ([address] )   Pharmacy phone number (if available)? [phone]   ---------------------------------------------------------------------------   --------------,   Name of Medication? atorvastatin (LIPITOR) 20 mg tablet   Patient-reported dosage and instructions? once a day   How many days do you have left? 0   Preferred Pharmacy?  [facility] ([address] )   Pharmacy phone number (if available)? [phone]

## 2022-12-09 NOTE — TELEPHONE ENCOUNTER
Requested but both were already sent to pharmacy on 11/14/22 and 12/7/22. Mati Amaral was called and verbalized understanding on note below.

## 2022-12-27 DIAGNOSIS — Z79.4 TYPE 2 DIABETES MELLITUS WITHOUT COMPLICATION, WITH LONG-TERM CURRENT USE OF INSULIN (HCC): Primary | ICD-10-CM

## 2022-12-27 DIAGNOSIS — E11.9 TYPE 2 DIABETES MELLITUS WITHOUT COMPLICATION, WITH LONG-TERM CURRENT USE OF INSULIN (HCC): Primary | ICD-10-CM

## 2022-12-27 NOTE — TELEPHONE ENCOUNTER
Requested Prescriptions     Pending Prescriptions Disp Refills    SITagliptin phosphate (Jaunuvia) 100 mg tablet 90 Tablet 0     Sig: TAKE 1 TABLET BY MOUTH  DAILY     03/09/2022 01/03/2023  [facility]

## 2022-12-27 NOTE — TELEPHONE ENCOUNTER
Requested Prescriptions     Pending Prescriptions Disp Refills    SITagliptin phosphate (Januvia) 100 mg tablet 90 Tablet 0     Sig: TAKE 1 TABLET BY MOUTH  DAILY         [facility]) [address]  [facility] [address]  Phone: [phone] Fax: [phone]       3/9/2022 is LAST OFFICE VISIT     Future Appointments   Date Time Provider Juan Miguel Quiroga   1/3/2023  3:00 [facility]   1/6/2023 11:30 AM Del DO Malini [facility] BS AMB   2/3/2023  2:40 PM Yusef Nieves, [facility] BS AMB

## 2023-01-06 ENCOUNTER — VIRTUAL VISIT (OUTPATIENT)
Dept: INTERNAL MEDICINE CLINIC | Age: 73
End: 2023-01-06
Payer: MEDICAID

## 2023-01-06 DIAGNOSIS — Z79.4 TYPE 2 DIABETES MELLITUS WITH DIABETIC NEPHROPATHY, WITH LONG-TERM CURRENT USE OF INSULIN (HCC): Primary | ICD-10-CM

## 2023-01-06 DIAGNOSIS — F40.240 CLAUSTROPHOBIA: ICD-10-CM

## 2023-01-06 DIAGNOSIS — E11.21 TYPE 2 DIABETES MELLITUS WITH DIABETIC NEPHROPATHY, WITH LONG-TERM CURRENT USE OF INSULIN (HCC): Primary | ICD-10-CM

## 2023-01-06 DIAGNOSIS — I10 ESSENTIAL HYPERTENSION: ICD-10-CM

## 2023-01-06 DIAGNOSIS — E66.3 OVERWEIGHT (BMI 25.0-29.9): ICD-10-CM

## 2023-01-06 DIAGNOSIS — B18.2 HEP C W/O COMA, CHRONIC (HCC): ICD-10-CM

## 2023-01-06 DIAGNOSIS — F33.42 RECURRENT MAJOR DEPRESSIVE DISORDER, IN FULL REMISSION (HCC): ICD-10-CM

## 2023-01-06 DIAGNOSIS — G35 MS (MULTIPLE SCLEROSIS) (HCC): ICD-10-CM

## 2023-01-06 DIAGNOSIS — M05.79 RHEUMATOID ARTHRITIS INVOLVING MULTIPLE SITES WITH POSITIVE RHEUMATOID FACTOR (HCC): ICD-10-CM

## 2023-01-06 PROBLEM — E11.22 TYPE 2 DIABETES MELLITUS WITH CHRONIC KIDNEY DISEASE (HCC): Status: ACTIVE | Noted: 2023-01-06

## 2023-01-06 RX ORDER — CITALOPRAM 40 MG/1
40 TABLET, FILM COATED ORAL DAILY
Qty: 90 TABLET | Refills: 1 | Status: SHIPPED | OUTPATIENT
Start: 2023-01-06 | End: 2023-04-06

## 2023-01-06 RX ORDER — AMMONIUM LACTATE 12 G/100G
LOTION TOPICAL
Qty: 675 G | Refills: 1 | Status: SHIPPED | OUTPATIENT
Start: 2023-01-06

## 2023-01-06 RX ORDER — LORAZEPAM 0.5 MG/1
TABLET ORAL
Qty: 30 TABLET | Refills: 0 | Status: SHIPPED | OUTPATIENT
Start: 2023-01-06

## 2023-01-06 RX ORDER — PANTOPRAZOLE SODIUM 40 MG/1
TABLET, DELAYED RELEASE ORAL
COMMUNITY
Start: 2022-12-12

## 2023-01-06 NOTE — PROGRESS NOTES
Peace Serna (: 1950) is a 81 y.o. female, established patient, here for evaluation of the following chief complaint(s):   Diabetes, Post-COVID Symptoms, and Discuss Medications       ASSESSMENT/PLAN:  Below is the assessment and plan developed based on review of pertinent history, labs, studies, and medications. 1. Type 2 diabetes mellitus with diabetic nephropathy, with long-term current use of insulin ([facility])  Monitor BS at home with goal of 100-150   Stable chronic condition. Continue current treatment/medications. 2. Essential hypertension  Monitor BP at home with goal of 140/90 or less   Stable chronic condition. Continue current treatment/medications. 3. Recurrent major depressive disorder, in full remission ([facility])  Increase Celexa to 40 mg daily  Lorazepam 0.5 mg 1 daily as needed #30  4. MS (multiple sclerosis) ([facility])  Stable chronic condition. Continue current treatment/medications. 5. Rheumatoid arthritis involving multiple sites with positive rheumatoid factor ([facility]  6. Hep C w/o coma, chronic ([facility])  7. Overweight (BMI 25.0-29. 9)  Advised patient to lose weight by watching diet (decreasing sugars/carbs/fat, increasing fruits/vegetables), exercising at least 30 minutes daily, getting 7-8 hours of sleep daily, drinking plenty of water, and decreasing stress    8. Claustrophobia/anxiety  -     LORazepam (ATIVAN) 0.5 mg tablet; 1 every day prn, Normal, Disp-30 Tablet, R-0      Return in about 6 months (around 2023), or if symptoms worsen or fail to improve. SUBJECTIVE/OBJECTIVE:  Pt. is seen virtually for f/u. Has a few chronic medical issues as documented. Reports having COVID recently. Feels much better now. Just tested negative. Has been vaccinated. Other family members were sick as well. Reports having increased stress, anxiety and depression. Remains on Celexa 20 mg daily. Has taken lorazepam off and on as needed.     Followed by neurologist for MS which has been stable on medications. Denies any new neurological issues. Has history of RA but not seeing rheumatologist in a while. Continues have chronic arthritic pains. Denies any falls. Followed by hepatology for hepatitis C. Has been on treatment. Reports diabetes and blood pressure being stable on current medications. All other chronic medical issues are stable on current treatment regimen. PMH/PSH/Allergies/Social History/medication list and most recent studies reviewed with patient. Tobacco use: No  Alcohol use: Social  Reports compliance with medications and diet. Trying to be active physically to control weight. Reports no other new c/o. Past Medical History:   Diagnosis Date    Arrhythmia     irregular heatbeat/heart murmur - evaluated and ok    Arthritis     OSTEO A & OSTEOPOROSIS     Autoimmune disease ([facility] [address] 1999    MS    Chronic pain     all over    Depression     Diabetes ([facility] [address]     GERD (gastroesophageal reflux disease)     hiatal hernia    Hepatitis C     Hypertension     LBP (low back pain)     Lipoma 9/3/2013    Liver disease     CHRONIC HEP C - stage 3 fibrosis    MS (multiple sclerosis) ([facility] [address]     Other ill-defined conditions(799.89)     MS    Other ill-defined conditions(799.89)     HX DRUG ABUSE. CLEAN FOR 21 YRS. Psychiatric disorder     DEPRESSION.     PUD (peptic ulcer disease)     duodenal ulcer    Stroke [facility])     Diagnosed 10/2011 - no deficits now    Urinary incontinence        Allergies   Allergen Reactions    Penicillins Swelling and Rash    Alendronate Swelling     Other reaction(s): tongue swelling    Amoxicillin Diarrhea    Penicillin Diarrhea    Sulfa (Sulfonamide Antibiotics) Itching and Rash       Current Outpatient Medications on File Prior to Visit   Medication Sig Dispense Refill    pantoprazole (PROTONIX) 40 mg tablet TAKE 1 BY MOUTH EVERY MORNING FOR 30 DAYS      SITagliptin phosphate (Januvia) 100 mg tablet TAKE 1 TABLET BY MOUTH  DAILY 90 Tablet 0 amLODIPine (NORVASC) 10 mg tablet Take 1 Tablet by mouth daily. 90 Tablet 0    atorvastatin (LIPITOR) 20 mg tablet 1 pill nightly 90 Tablet 0    metoprolol succinate (TOPROL-XL) 200 mg XL tablet TAKE 1 TABLET BY MOUTH  DAILY 90 Tablet 3    Vumerity 231 mg cpDR Take 2 capsules by mouth twice daily. 120 Each 1    Lantus Solostar U-100 Insulin 100 unit/mL (3 mL) inpn INJECT SUBCUTANEOUSLY 25  UNITS AT BEDTIME 15 mL 11    raloxifene (EVISTA) 60 mg tablet TAKE 1 TABLET BY MOUTH EVERY DAY 60 Tablet 0    cloNIDine HCL (CATAPRES) 0.1 mg tablet TAKE 1 TABLET BY MOUTH  TWICE DAILY 180 Tablet 3    omeprazole (PRILOSEC) 20 mg capsule TAKE 1 CAPSULE BY MOUTH  DAILY 90 Capsule 3    Insulin Needles, Disposable, (BD Ultra-Fine Mini Pen Needle) 31 gauge x 3/16\" ndle USE TO INJECT INSULIN 100 Pen Needle 3    glipiZIDE (GLUCOTROL) 10 mg tablet TAKE 1 TABLET BY MOUTH  TWICE DAILY 180 Tablet 1    ibuprofen (MOTRIN) 800 mg tablet Take 1 Tab by mouth every eight (8) hours as needed for Pain. 30 Tab 0    OneTouch Delica Plus Lancet 30 gauge misc USE TO CHECK BLOOD SUGAR  TWO TIMES A  Lancet 5    lancets misc One Touch Del Plus Lancets. Use to check BS 2 x a day 100 Each 11    OTHER One touch verio glucometer 1 Units 0    OTHER One touch verio test strips, monitor BS 2 x a day 50 Strip 11    calcium citrate-vitamin D3 (CITRACAL WITH VITAMIN D MAXIMUM) tablet Take 1 Tab by mouth daily. 30 Tab 5    flash glucose sensor (FREESTYLE TORSTEN 14 DAY SENSOR) kit 1 Units by Does Not Apply route every fourteen (14) days. 2 Kit 11    flash glucose scanning reader (FREESTYLE TORSTEN 14 DAY READER) misc 1 Units by Does Not Apply route daily. 1 Each 0    OTHER lantus solostar insulin U-100. 20 units at bedtime. cinnamon bark (CINNAMON PO) Take 1,000 mg by mouth daily as needed (takes for  or greater). SCHISANDRA PO Take  by mouth. schisandra extract 500 mg po once daily. melatonin 3 mg tablet Take 3 mg by mouth nightly as needed. Blood-Glucose Meter monitoring kit Use to check BS 2 x a day 1 Kit 0    aspirin 81 mg chewable tablet Take 81 mg by mouth daily. [DISCONTINUED] citalopram (CELEXA) 20 mg tablet TAKE 1 TABLET BY MOUTH  DAILY 31 Tablet 10    [DISCONTINUED] ammonium lactate (LAC-HYDRIN) 12 % lotion APPLY TOPICALLY AND RUB IN  TO AFFECTED AREA ON LEGS  TWICE DAILY 675 g 1    [DISCONTINUED] LORazepam (ATIVAN) 0.5 mg tablet Take 1 tab 30 minutes before imaging. May repeat once if needed. Max daily dose 1mg. 2 Tablet 0     No current facility-administered medications on file prior to visit. There were no vitals taken for this visit.           [INSTRUCTIONS:  \"[x]\" Indicates a positive item  \"[]\" Indicates a negative item  -- DELETE ALL ITEMS NOT EXAMINED]    Constitutional: [x] Appears well-developed and well-nourished [x] No apparent distress      [] Abnormal -     Mental status: [x] Alert and awake  [x] Oriented to person/place/time [x] Able to follow commands    [] Abnormal -     Eyes:   EOM    [x]  Normal    [] Abnormal -   Sclera  [x]  Normal    [] Abnormal -          Discharge [x]  None visible   [] Abnormal -     HENT: [x] Normocephalic, atraumatic  [] Abnormal -   [x] Mouth/Throat: Mucous membranes are moist    External Ears [x] Normal  [] Abnormal -    Neck: [x] No visualized mass [] Abnormal -     Pulmonary/Chest: [x] Respiratory effort normal   [x] No visualized signs of difficulty breathing or respiratory distress        [] Abnormal -      Musculoskeletal:   [x] Normal gait with no signs of ataxia         [x] Normal range of motion of neck        [] Abnormal -     Neurological:        [x] No Facial Asymmetry (Cranial nerve 7 motor function) (limited exam due to video visit)          [x] No gaze palsy        [] Abnormal -          Skin:        [x] No significant exanthematous lesions or discoloration noted on facial skin         [] Abnormal -            Psychiatric:       [x] Normal Affect [] Abnormal -        [x] No Hallucinations    Other pertinent observable physical exam findings:-    On this date 01/06/2023 I have spent 30 minutes reviewing previous notes, test results and face to face (virtual) with the patient discussing the diagnosis and importance of compliance with the treatment plan as well as documenting on the day of the visit. Anastasiia Villar, was evaluated through a synchronous (real-time) audio-video encounter. The patient (or guardian if applicable) is aware that this is a billable service, which includes applicable co-pays. This Virtual Visit was conducted with patient's (and/or legal guardian's) consent. The visit was conducted pursuant to the emergency declaration under the [facility], [address] [place] [address] authority and the [employer] Act. Patient identification was verified, and a caregiver was present when appropriate. The patient was located at: Home: [address] [address]  The provider was located at: [facility] [address] [place] [id number]       An electronic signature was used to authenticate this note.   -- David Medellin, DO

## 2023-01-06 NOTE — PROGRESS NOTES
ADVISED PATIENT OF THE FOLLOWING HEALTH MAINTAINCE DUE  Health Maintenance Due   Topic Date Due    Eye Exam Retinal or Dilated  Never done    Hepatitis B Vaccine (1 of 3 - Risk 3-dose series) Never done    DTaP/Tdap/Td series (1 - Tdap) Never done    Colorectal Cancer Screening Combo  Never done    COVID-19 Vaccine (4 - Booster for [place] [employer] series) 03/10/2022    Flu Vaccine (1) 08/01/2022      Chief Complaint   Patient presents with    Diabetes    Post-COVID Symptoms    Discuss Medications       1. \"Have you been to the ER, urgent care clinic since your last visit? Hospitalized since your last visit? \" No    2. \"Have you seen or consulted any other health care providers outside of the [address] [place] [address] since your last visit? \" No     3. For patients aged 39-70: Has the patient had a colonoscopy / FIT/ Cologuard? No      If the patient is female:    4. For patients aged 41-77: Has the patient had a mammogram within the past 2 years? Most recent on file. 5. For patients aged 21-65: Has the patient had a pap smear?  NA - based on age or sex

## 2023-01-30 DIAGNOSIS — M81.6 LOCALIZED OSTEOPOROSIS, UNSPECIFIED PATHOLOGICAL FRACTURE PRESENCE: ICD-10-CM

## 2023-01-30 RX ORDER — RALOXIFENE HYDROCHLORIDE 60 MG/1
TABLET, FILM COATED ORAL
Qty: 30 TABLET | Refills: 1 | Status: SHIPPED | OUTPATIENT
Start: 2023-01-30

## 2023-02-03 ENCOUNTER — OFFICE VISIT (OUTPATIENT)
Dept: NEUROLOGY | Age: 73
End: 2023-02-03
Payer: MEDICAID

## 2023-02-03 VITALS — DIASTOLIC BLOOD PRESSURE: 80 MMHG | SYSTOLIC BLOOD PRESSURE: 132 MMHG

## 2023-02-03 DIAGNOSIS — G35 MS (MULTIPLE SCLEROSIS) (HCC): Primary | ICD-10-CM

## 2023-02-03 RX ORDER — DEXTROAMPHETAMINE SACCHARATE, AMPHETAMINE ASPARTATE, DEXTROAMPHETAMINE SULFATE AND AMPHETAMINE SULFATE 1.25; 1.25; 1.25; 1.25 MG/1; MG/1; MG/1; MG/1
5 TABLET ORAL DAILY
Qty: 30 TABLET | Refills: 0 | Status: SHIPPED | OUTPATIENT
Start: 2023-02-03

## 2023-02-03 NOTE — PROGRESS NOTES
Would like to switch to ocrevus  Has fallen recently, said she tripped over some boxes  Said she has been having a real sharp pain wrapping around her right side

## 2023-02-05 NOTE — PROGRESS NOTES
Lico Guerra is a 81 y.o. female who presents with the following  Chief Complaint   Patient presents with    Follow-up       HPI    Patient comes in for follow-up with MS  She is accompanied with her daughter here today  She is currently on Vumerity and does want to switch this therapy  She is having some trouble swallowing it and getting the pill down  She just feels tired and depressed and not like herself  She is interested in [place] and has seen this and read about it more and she does look like a good candidate for this  We did discuss this and she will stop the Vumerity today  She has not had any recent flareups that she knows  She was ordered MRIs but does need complete IV sedation for these so did not get them but since we are switching therapy will get this over the summer after her first dose of Joe  She has not had any big changes in her overall physical status  She is fatigued and weak  She does not have a lot of drive to want a get up and do anything  Daughter confirms that she is somewhat depressed and has lost her spunk  She is interested in trying a low-dose of some type of stimulant or something to help with her fatigue  She has not had any purposeful falls as she did have a fall and tripped over a box  She has no paresthesia  No headaches or dizziness            Allergies   Allergen Reactions    Penicillins Swelling and Rash    Alendronate Swelling     Other reaction(s): tongue swelling    Amoxicillin Diarrhea    Penicillin Diarrhea    Sulfa (Sulfonamide Antibiotics) Itching and Rash       Current Outpatient Medications   Medication Sig    dextroamphetamine-amphetamine (AdderalL) 5 mg tablet Take 1 Tablet by mouth daily.  Max Daily Amount: 5 mg.    raloxifene (EVISTA) 60 mg tablet TAKE 1 TABLET BY MOUTH EVERY DAY    pantoprazole (PROTONIX) 40 mg tablet TAKE 1 BY MOUTH EVERY MORNING FOR 30 DAYS    LORazepam (ATIVAN) 0.5 mg tablet 1 every day prn    citalopram (CELEXA) 40 mg tablet Take 1 Tablet by mouth daily for 90 days. ammonium lactate (LAC-HYDRIN) 12 % lotion APPLY TOPICALLY AND RUB IN  TO AFFECTED AREA ON LEGS  TWICE DAILY    SITagliptin phosphate (Januvia) 100 mg tablet TAKE 1 TABLET BY MOUTH  DAILY    amLODIPine (NORVASC) 10 mg tablet Take 1 Tablet by mouth daily. atorvastatin (LIPITOR) 20 mg tablet 1 pill nightly    metoprolol succinate (TOPROL-XL) 200 mg XL tablet TAKE 1 TABLET BY MOUTH  DAILY    Lantus Solostar U-100 Insulin 100 unit/mL (3 mL) inpn INJECT SUBCUTANEOUSLY 25  UNITS AT BEDTIME    cloNIDine HCL (CATAPRES) 0.1 mg tablet TAKE 1 TABLET BY MOUTH  TWICE DAILY    Insulin Needles, Disposable, (BD Ultra-Fine Mini Pen Needle) 31 gauge x 3/16\" ndle USE TO INJECT INSULIN    glipiZIDE (GLUCOTROL) 10 mg tablet TAKE 1 TABLET BY MOUTH  TWICE DAILY    ibuprofen (MOTRIN) 800 mg tablet Take 1 Tab by mouth every eight (8) hours as needed for Pain. OneTouch Delica Plus Lancet 30 gauge misc USE TO CHECK BLOOD SUGAR  TWO TIMES A DAY    lancets misc One Touch Del Plus Lancets. Use to check BS 2 x a day    OTHER One touch verio glucometer    OTHER One touch verio test strips, monitor BS 2 x a day    calcium citrate-vitamin D3 (CITRACAL WITH VITAMIN D MAXIMUM) tablet Take 1 Tab by mouth daily. flash glucose sensor (FREESTYLE TORSTEN 14 DAY SENSOR) kit 1 Units by Does Not Apply route every fourteen (14) days. flash glucose scanning reader (FREESTYLE TORSTEN 14 DAY READER) misc 1 Units by Does Not Apply route daily. OTHER lantus solostar insulin U-100. 20 units at bedtime. SCHISANDRA PO Take  by mouth. schisandra extract 500 mg po once daily. melatonin 3 mg tablet Take 3 mg by mouth nightly as needed. Blood-Glucose Meter monitoring kit Use to check BS 2 x a day    aspirin 81 mg chewable tablet Take 81 mg by mouth daily.     omeprazole (PRILOSEC) 20 mg capsule TAKE 1 CAPSULE BY MOUTH  DAILY (Patient not taking: Reported on 2/3/2023)    cinnamon bark (CINNAMON PO) Take 1,000 mg by mouth daily as needed (takes for  or greater). (Patient not taking: Reported on 2/3/2023)     No current facility-administered medications for this visit. Social History     Tobacco Use   Smoking Status Never   Smokeless Tobacco Never       Past Medical History:   Diagnosis Date    Arrhythmia     irregular heatbeat/heart murmur - evaluated and ok    Arthritis     OSTEO A & OSTEOPOROSIS     Autoimmune disease ([facility] [address] 1999    MS    Chronic pain     all over    Depression     Diabetes ([facility] [address]     GERD (gastroesophageal reflux disease)     hiatal hernia    Hepatitis C     Hypertension     LBP (low back pain)     Lipoma 9/3/2013    Liver disease     CHRONIC HEP C - stage 3 fibrosis    MS (multiple sclerosis) ([facility] [address]     Other ill-defined conditions(799.89)     MS    Other ill-defined conditions(799.89)     HX DRUG ABUSE. CLEAN FOR 21 YRS. Psychiatric disorder     DEPRESSION. PUD (peptic ulcer disease)     duodenal ulcer    Stroke [facility])     Diagnosed 10/2011 - no deficits now    Urinary incontinence        Past Surgical History:   Procedure Laterality Date    HX GI      COLONOSCOPY X 1    HX HYSTERECTOMY      HX LIPOMA RESECTION  9/6/13     Excision of lipoma, left posterior thigh       Family History   Problem Relation Age of Onset    Arthritis-rheumatoid Mother     Hypertension Mother     Osteoporosis Mother     Other Mother         curved spine/hump on one side of back    Cancer Father         ? where    Breast Cancer Maternal Aunt     Breast Cancer Maternal Aunt     Stroke Maternal Grandmother     Stroke Other         maternal great uncle    Heart Disease Maternal Aunt        Social History     Socioeconomic History    Marital status: SINGLE   Tobacco Use    Smoking status: Never    Smokeless tobacco: Never   Substance and Sexual Activity    Alcohol use: No    Drug use: No    Sexual activity: Never       Review of Systems   Constitutional:  Positive for malaise/fatigue.    Eyes:  Negative for blurred vision, double vision and photophobia. Respiratory:  Negative for shortness of breath and wheezing. Cardiovascular:  Negative for chest pain and palpitations. Gastrointestinal:  Negative for nausea and vomiting. Musculoskeletal:  Negative for falls. Neurological:  Positive for tingling and weakness. Negative for dizziness, seizures, loss of consciousness and headaches. Psychiatric/Behavioral:  Positive for depression. Negative for hallucinations, memory loss, substance abuse and suicidal ideas. The patient is nervous/anxious. The patient does not have insomnia. Remainder of comprehensive review is negative. Physical Exam :    Visit Vitals  /80 (BP 1 Location: Left upper arm, BP Patient Position: Sitting, BP Cuff Size: Adult)       General: Well defined, nourished, and groomed individual in no acute distress. Musculoskeletal: Extremities revealed no edema and had full range of motion of joints. Psych: Good mood and bright affect    NEUROLOGICAL EXAMINATION:    Mental Status: Alert and oriented to person, place, and time    Cranial Nerves:    II, III, IV, VI: Visual acuity grossly intact. Visual fields are normal.    Pupils are equal, round, and reactive to light and accommodation. Extra-ocular movements are full and fluid. Fundoscopic exam was benign, no ptosis or nystagmus. V-XII: Hearing is grossly intact. Facial features are symmetric, with normal sensation and strength. The palate rises symmetrically and the tongue protrudes midline. Sternocleidomastoids 5/5. Motor Examination: Normal tone, bulk, and strength, 5/5 muscle strength throughout. Coordination: Finger to nose was normal. No resting or intention tremor    Gait and Station: Steady while walking. Normal arm swing. No pronator drift. No muscle wasting or fasiculations noted. Reflexes: DTRs 3+ throughout.       Results for orders placed or performed in visit on 11/14/22   NOVEL CORONAVIRUS (COVID-19) Result Value Ref Range    SARS-CoV-2, SUMEET Negative        Orders Placed This Encounter    AYAD VIRUS DNA BY PCR, QL     Standing Status:   Future     Standing Expiration Date:   2/3/2024    CBC WITH AUTOMATED DIFF     Standing Status:   Future     Standing Expiration Date:   6/1/3923    METABOLIC PANEL, COMPREHENSIVE     Standing Status:   Future     Standing Expiration Date:   2/3/2024    QUANTIFERON-TB PLUS(CLIENT INCUB.)     Standing Status:   Future     Standing Expiration Date:   2/3/2024    T3, FREE     Standing Status:   Future     Standing Expiration Date:   2/3/2024    T4, FREE     Standing Status:   Future     Standing Expiration Date:   2/3/2024    REFERRAL TO PHYSICAL THERAPY     Referral Priority:   Routine     Referral Type:   PT/OT/ST     Referral Reason:   Specialty Services Required     Number of Visits Requested:   1    dextroamphetamine-amphetamine (AdderalL) 5 mg tablet     Sig: Take 1 Tablet by mouth daily. Max Daily Amount: 5 mg. Dispense:  30 Tablet     Refill:  0       1.  MS (multiple sclerosis) ([facility] [address]      Discussed treatment in full  She will stop the Vumerity today and we will switch to [address] [place] [address] for DMT therapy  Refer to [facility] for PT specifically regards to [address]  Get the labs as seen above before starting Ocrevus  We did discuss we will have to be off of Vumerity for at least 30 days before initiation    For her significant fatigue we will try low-dose of Adderall 5 mg daily to help with this  She hopes that this can help get her the drive to get up and get moving and wanted do more and not feel as tired and lethargic              This note will not be viewable in [facility]

## 2023-02-07 DIAGNOSIS — E11.21 TYPE 2 DIABETES MELLITUS WITH DIABETIC NEPHROPATHY, WITH LONG-TERM CURRENT USE OF INSULIN (HCC): ICD-10-CM

## 2023-02-07 DIAGNOSIS — Z79.4 TYPE 2 DIABETES MELLITUS WITH DIABETIC NEPHROPATHY, WITH LONG-TERM CURRENT USE OF INSULIN (HCC): ICD-10-CM

## 2023-02-08 RX ORDER — PEN NEEDLE, DIABETIC 31 GX3/16"
NEEDLE, DISPOSABLE MISCELLANEOUS
Qty: 100 PEN NEEDLE | Refills: 5 | Status: SHIPPED | OUTPATIENT
Start: 2023-02-08

## 2023-02-12 DIAGNOSIS — Z79.4 TYPE 2 DIABETES MELLITUS WITHOUT COMPLICATION, WITH LONG-TERM CURRENT USE OF INSULIN (HCC): ICD-10-CM

## 2023-02-12 DIAGNOSIS — E11.9 TYPE 2 DIABETES MELLITUS WITHOUT COMPLICATION, WITH LONG-TERM CURRENT USE OF INSULIN (HCC): ICD-10-CM

## 2023-02-12 RX ORDER — AMLODIPINE BESYLATE 10 MG/1
10 TABLET ORAL DAILY
Qty: 90 TABLET | Refills: 3 | Status: SHIPPED | OUTPATIENT
Start: 2023-02-12

## 2023-02-12 RX ORDER — ATORVASTATIN CALCIUM 20 MG/1
TABLET, FILM COATED ORAL
Qty: 90 TABLET | Refills: 3 | Status: SHIPPED | OUTPATIENT
Start: 2023-02-12

## 2023-02-14 LAB
ALBUMIN SERPL-MCNC: 4.6 G/DL (ref 3.8–4.8)
ALBUMIN/CREAT UR: 129 MG/G CREAT (ref 0–29)
ALBUMIN/GLOB SERPL: 1.6 {RATIO} (ref 1.2–2.2)
ALP SERPL-CCNC: 93 IU/L (ref 44–121)
ALT SERPL-CCNC: 28 IU/L (ref 0–32)
AST SERPL-CCNC: 22 IU/L (ref 0–40)
BILIRUB SERPL-MCNC: 0.3 MG/DL (ref 0–1.2)
BUN SERPL-MCNC: 15 MG/DL (ref 8–27)
BUN/CREAT SERPL: 14 (ref 12–28)
CALCIUM SERPL-MCNC: 9.5 MG/DL (ref 8.7–10.3)
CHLORIDE SERPL-SCNC: 98 MMOL/L (ref 96–106)
CO2 SERPL-SCNC: 24 MMOL/L (ref 20–29)
CREAT SERPL-MCNC: 1.06 MG/DL (ref 0.57–1)
CREAT UR-MCNC: 395.4 MG/DL
EGFRCR SERPLBLD CKD-EPI 2021: 57 ML/MIN/1.73
GLOBULIN SER CALC-MCNC: 2.9 G/DL (ref 1.5–4.5)
GLUCOSE SERPL-MCNC: 154 MG/DL (ref 70–99)
MICROALBUMIN UR-MCNC: 508.4 UG/ML
POTASSIUM SERPL-SCNC: 4.1 MMOL/L (ref 3.5–5.2)
PROT SERPL-MCNC: 7.5 G/DL (ref 6–8.5)
REPORT: NORMAL
SODIUM SERPL-SCNC: 140 MMOL/L (ref 134–144)

## 2023-02-20 DIAGNOSIS — F40.240 CLAUSTROPHOBIA: ICD-10-CM

## 2023-02-21 RX ORDER — LORAZEPAM 0.5 MG/1
TABLET ORAL
Qty: 30 TABLET | Refills: 0 | Status: SHIPPED | OUTPATIENT
Start: 2023-02-21

## 2023-03-07 ENCOUNTER — TELEPHONE (OUTPATIENT)
Dept: INTERNAL MEDICINE CLINIC | Age: 73
End: 2023-03-07

## 2023-03-07 NOTE — TELEPHONE ENCOUNTER
Call placed to patient, no answer. Patient will have to contact neurologist for results, since Dr Javier Jacobson is not the ordering provider we cannot result labs.

## 2023-03-07 NOTE — TELEPHONE ENCOUNTER
----- Message from Artie Mae sent at 3/7/2023 12:18 PM EST -----  Subject: Message to Provider    QUESTIONS  Information for Provider? pt has been trying to call [facility]   to get her blood results for days but has not been able to reach anyone. I   called for the pt today and after 20 minutes we have not been able to   reach anyone. She needs to get her blood work results so she knows if she   can start her new medication.   ---------------------------------------------------------------------------  --------------  Kody Pages ACTV  [phone]; OK to leave message on voicemail  ---------------------------------------------------------------------------  --------------  SCRIPT ANSWERS  Relationship to Patient?  Self

## 2023-03-08 ENCOUNTER — TELEPHONE (OUTPATIENT)
Dept: NEUROLOGY | Age: 73
End: 2023-03-08

## 2023-03-08 NOTE — TELEPHONE ENCOUNTER
Patient left a message with the answering service hat her blood work has been completed and she'd like to discuss starting the new MS medication.

## 2023-03-10 NOTE — TELEPHONE ENCOUNTER
Message from Rocky [address] [place] [address] her meds for 30 days now, unable to get a hold of someone for patient's MS\"

## 2023-03-15 ENCOUNTER — TELEPHONE (OUTPATIENT)
Dept: INTERNAL MEDICINE CLINIC | Age: 73
End: 2023-03-15

## 2023-03-15 NOTE — TELEPHONE ENCOUNTER
Pt unable to get in touch with neurologist. She was taken off her MS medication to try a new one. The doctor wanted to wait until it got out of her system. Pt has been without medication for 1 month. Pt recently had a fall and daughter is concerned. Made appointment today for patient on 4/5/23 with Dr. Domenica Leyden.  Pt daughter wants to see if Dr. Domenica Leyden will short fill original MS medication until they can see a new neurologist or help them make an appointment for pt since they are not able to get a call back from her neurologist. Please advise

## 2023-03-15 NOTE — TELEPHONE ENCOUNTER
Message from Rocky 59,     \" Has been calling all month with no response. Patient has multiple sclerosis and needs new medication. I don't want to have to go to the news with this. Please call ASAP. \"

## 2023-03-16 NOTE — TELEPHONE ENCOUNTER
Call placed to [facility], no answer. Reviewed patient's chart and saw encounter in chart from 3/8/23 for 's office where nurse has reached out to start new med and did discuss this issue with patient. If patient needs any further assistance, can call back to discuss.

## 2023-03-18 RX ORDER — METHYLPREDNISOLONE SODIUM SUCCINATE 1 G/16ML
125 INJECTION, POWDER, LYOPHILIZED, FOR SOLUTION INTRAMUSCULAR; INTRAVENOUS ONCE
Status: DISCONTINUED | OUTPATIENT
Start: 2023-03-24 | End: 2023-03-24

## 2023-03-18 RX ORDER — ACETAMINOPHEN 325 MG/1
650 TABLET ORAL ONCE
Status: COMPLETED | OUTPATIENT
Start: 2023-03-24 | End: 2023-03-24

## 2023-03-18 RX ORDER — ACETAMINOPHEN 325 MG/1
650 TABLET ORAL
Status: DISCONTINUED | OUTPATIENT
Start: 2023-03-24 | End: 2023-03-26 | Stop reason: HOSPADM

## 2023-03-24 ENCOUNTER — HOSPITAL ENCOUNTER (OUTPATIENT)
Dept: INFUSION THERAPY | Age: 73
Discharge: HOME | End: 2023-03-24
Payer: MEDICAID

## 2023-03-24 VITALS
SYSTOLIC BLOOD PRESSURE: 134 MMHG | HEART RATE: 63 BPM | DIASTOLIC BLOOD PRESSURE: 57 MMHG | TEMPERATURE: 97.7 F | BODY MASS INDEX: 28.35 KG/M2 | RESPIRATION RATE: 18 BRPM | HEIGHT: 63 IN | OXYGEN SATURATION: 97 % | WEIGHT: 160 LBS

## 2023-03-24 PROCEDURE — 96413 CHEMO IV INFUSION 1 HR: CPT

## 2023-03-24 PROCEDURE — 74011250636 HC RX REV CODE- 250/636: Performed by: NURSE PRACTITIONER

## 2023-03-24 PROCEDURE — 96415 CHEMO IV INFUSION ADDL HR: CPT

## 2023-03-24 PROCEDURE — 74011250637 HC RX REV CODE- 250/637: Performed by: NURSE PRACTITIONER

## 2023-03-24 RX ORDER — SODIUM CHLORIDE 9 MG/ML
25 INJECTION, SOLUTION INTRAVENOUS AS NEEDED
Status: DISCONTINUED | OUTPATIENT
Start: 2023-03-24 | End: 2023-03-25 | Stop reason: HOSPADM

## 2023-03-24 RX ADMIN — OCRELIZUMAB 300 MG: 300 INJECTION INTRAVENOUS at 10:10

## 2023-03-24 RX ADMIN — SODIUM CHLORIDE 25 ML/HR: 9 INJECTION, SOLUTION INTRAVENOUS at 09:07

## 2023-03-24 RX ADMIN — METHYLPREDNISOLONE SODIUM SUCCINATE 125 MG: 125 INJECTION, POWDER, FOR SOLUTION INTRAMUSCULAR; INTRAVENOUS at 09:37

## 2023-03-24 RX ADMIN — ACETAMINOPHEN 650 MG: 325 TABLET ORAL at 09:32

## 2023-03-24 NOTE — PROGRESS NOTES
[facility] Progress [facility]    Date: March 24, 2023        0847: Pt arrived ambulatory to [facility] for Ocrevus (week 0) in stable condition. Assessment completed. PIV placed to left AC with positive blood return. Patient Vitals for the past 12 hrs:   Temp Pulse Resp BP SpO2   03/24/23 1409 -- 63 -- (!) 134/57 --   03/24/23 1224 -- 66 -- (!) 155/76 --   03/24/23 1150 -- 62 -- (!) 141/63 --   03/24/23 1119 -- 62 -- 135/77 --   03/24/23 1048 -- 63 -- (!) 140/61 --   03/24/23 0850 97.7 °F (36.5 °C) 65 18 (!) 141/75 97 %         Medications Administered       0.9% sodium chloride infusion       Admin Date  03/24/2023 Action  New Bag Dose  25 mL/hr Rate  25 mL/hr Route  IntraVENous Administered By  Ana Bedoya RN              acetaminophen (TYLENOL) tablet 650 mg       Admin Date  03/24/2023 Action  Given Dose  650 mg Route  Oral Administered By  Ana Bedoya RN              methylPREDNISolone (PF) (Solu-MEDROL) injection 125 mg       Admin Date  03/24/2023 Action  Given Dose  125 mg Route  IntraVENous Administered By  Ana Bedoya RN              ocrelizumab (OCREVUS) 300 mg in 0.9% sodium chloride 250 mL, overfill volume 25 mL infusion       Admin Date  03/24/2023 Action  New Bag Dose  300 mg Rate  30 mL/hr Route  IntraVENous Administered By  Ana Bedoya RN               Admin Date  03/24/2023 Action  Rate Change Dose   Rate  60 mL/hr Route  IntraVENous Administered By  Ana Bedoya RN               Admin Date  03/24/2023 Action  Rate Change Dose   Rate  90 mL/hr Route  IntraVENous Administered By  Ana Bedoya RN               Admin Date  03/24/2023 Action  Rate Change Dose   Rate  120 mL/hr Route  IntraVENous Administered By  Ana Bedoya RN               Admin Date  03/24/2023 Action  Rate Change Dose   Rate  150 mL/hr Route  IntraVENous Administered By  Ana Bedoya RN                       Ms. Mirlande Christie tolerated the infusion, and had no complaints.  Patient remained in [facility] for 1 hour post infusion observation period. PIV flushed and removed. 2x2 and coban placed    Ms. Meghann Galloway was discharged from [place] [address] in stable condition. Patient is aware of next scheduled [facility] appointment.          Future Appointments   Date Time Provider Juan Miguel Quiroga   4/5/2023  1:45 PM DO APRIL Harry BS AMB   4/7/2023 11:00 AM F3 [place] [facility]   5/4/2023  2:20 PM Anil Nieves, NP [facility] BS AMB   10/6/2023  8:00 AM F1 [place] [address] [facility] Monica Rodríguez RN  March 24, 2023

## 2023-04-01 DIAGNOSIS — F40.240 CLAUSTROPHOBIA: ICD-10-CM

## 2023-04-01 RX ORDER — LORAZEPAM 0.5 MG/1
TABLET ORAL
Qty: 30 TABLET | Refills: 0 | Status: SHIPPED | OUTPATIENT
Start: 2023-04-01

## 2023-04-05 ENCOUNTER — OFFICE VISIT (OUTPATIENT)
Dept: INTERNAL MEDICINE CLINIC | Age: 73
End: 2023-04-05
Payer: MEDICAID

## 2023-04-05 PROBLEM — K05.10 GINGIVITIS: Status: ACTIVE | Noted: 2023-04-05

## 2023-04-05 LAB — HBA1C MFR BLD HPLC: 7.9 %

## 2023-04-05 PROCEDURE — 99214 OFFICE O/P EST MOD 30 MIN: CPT | Performed by: INTERNAL MEDICINE

## 2023-04-05 PROCEDURE — 83036 HEMOGLOBIN GLYCOSYLATED A1C: CPT | Performed by: INTERNAL MEDICINE

## 2023-04-05 RX ORDER — CLINDAMYCIN HYDROCHLORIDE 300 MG/1
300 CAPSULE ORAL 3 TIMES DAILY
Qty: 21 CAPSULE | Refills: 0 | Status: SHIPPED
Start: 2023-04-05 | End: 2023-04-12

## 2023-04-05 RX ORDER — CHLORHEXIDINE GLUCONATE 1.2 MG/ML
15 RINSE ORAL EVERY 12 HOURS
Qty: 420 ML | Refills: 0 | Status: SHIPPED
Start: 2023-04-05 | End: 2023-04-19

## 2023-04-05 RX ORDER — ACETAMINOPHEN 325 MG/1
TABLET ORAL
Start: 2023-01-28
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
## 2023-04-05 NOTE — PROGRESS NOTES
Chief Complaint   Patient presents with    Medication Evaluation     1 month        Visit Vitals  /70   Pulse 67   Temp 97.8 °F (36.6 °C)   Resp 17   Ht 5' 3\" (1.6 m)   Wt 161 lb (73 kg)   SpO2 98%   BMI 28.52 kg/m²        1. Have you been to the ER, urgent care clinic since your last visit? Hospitalized since your last visit? No    2. Have you seen or consulted any other health care providers outside of the [address] [place] [address] since your last visit? Include any pap smears or colon screening. No     Health Maintenance Due   Topic Date Due    Eye Exam Retinal or Dilated  Never done    Hepatitis B Vaccine (1 of 3 - Risk 3-dose series) Never done    DTaP/Tdap/Td series (1 - Tdap) Never done    Colorectal Cancer Screening Combo  Never done    COVID-19 Vaccine (4 - Booster for Moderna series) 03/10/2022    Foot Exam Q1  03/09/2023    A1C test (Diabetic or Prediabetic)  03/09/2023    Lipid Screen  03/09/2023        3 most recent PHQ Screens 4/5/2023   PHQ Not Done -   Little interest or pleasure in doing things Not at all   Feeling down, depressed, irritable, or hopeless Not at all   Total Score PHQ 2 0   Trouble falling or staying asleep, or sleeping too much -   Feeling tired or having little energy -   Poor appetite, weight loss, or overeating -   Feeling bad about yourself - or that you are a failure or have let yourself or your family down -   Trouble concentrating on things such as school, work, reading, or watching TV -   Moving or speaking so slowly that other people could have noticed; or the opposite being so fidgety that others notice -   Thoughts of being better off dead, or hurting yourself in some way -   PHQ 9 Score -   How difficult have these problems made it for you to do your work, take care of your home and get along with others -        Fall Risk Assessment, last 12 mths 4/5/2023   Able to walk? Yes   Fall in past 12 months? 0   Do you feel unsteady?  0   Are you worried about falling 0       Learning Assessment 1/13/2017   PRIMARY LEARNER Patient   HIGHEST LEVEL OF EDUCATION - PRIMARY LEARNER  GRADUATED HIGH SCHOOL OR GED   BARRIERS PRIMARY LEARNER OTHER   CO-LEARNER CAREGIVER No   PRIMARY LANGUAGE ENGLISH   LEARNER PREFERENCE PRIMARY LISTENING   ANSWERED BY Patient   RELATIONSHIP SELF

## 2023-04-10 RX ORDER — ACETAMINOPHEN 325 MG/1
650 TABLET ORAL ONCE
Status: COMPLETED | OUTPATIENT
Start: 2023-04-17 | End: 2023-04-17

## 2023-04-10 RX ORDER — SODIUM CHLORIDE 9 MG/ML
25 INJECTION, SOLUTION INTRAVENOUS AS NEEDED
Status: DISPENSED | OUTPATIENT
Start: 2023-04-17 | End: 2023-04-17

## 2023-04-10 RX ORDER — ACETAMINOPHEN 325 MG/1
650 TABLET ORAL
Status: ACTIVE | OUTPATIENT
Start: 2023-04-17 | End: 2023-04-17

## 2023-04-17 ENCOUNTER — HOSPITAL ENCOUNTER (OUTPATIENT)
Dept: INFUSION THERAPY | Age: 73
Discharge: HOME | End: 2023-04-17
Payer: MEDICAID

## 2023-04-17 VITALS
BODY MASS INDEX: 28.39 KG/M2 | RESPIRATION RATE: 18 BRPM | SYSTOLIC BLOOD PRESSURE: 133 MMHG | WEIGHT: 160.23 LBS | HEART RATE: 64 BPM | DIASTOLIC BLOOD PRESSURE: 70 MMHG | TEMPERATURE: 97.7 F | HEIGHT: 63 IN | OXYGEN SATURATION: 98 %

## 2023-04-17 PROCEDURE — 96365 THER/PROPH/DIAG IV INF INIT: CPT

## 2023-04-17 PROCEDURE — 74011250636 HC RX REV CODE- 250/636: Performed by: NURSE PRACTITIONER

## 2023-04-17 PROCEDURE — 74011250637 HC RX REV CODE- 250/637: Performed by: NURSE PRACTITIONER

## 2023-04-17 PROCEDURE — 96366 THER/PROPH/DIAG IV INF ADDON: CPT

## 2023-04-17 PROCEDURE — 96375 TX/PRO/DX INJ NEW DRUG ADDON: CPT

## 2023-04-17 RX ADMIN — ACETAMINOPHEN 650 MG: 325 TABLET ORAL at 07:42

## 2023-04-17 RX ADMIN — SODIUM CHLORIDE 25 ML/HR: 9 INJECTION, SOLUTION INTRAVENOUS at 07:41

## 2023-04-17 RX ADMIN — OCRELIZUMAB 300 MG: 300 INJECTION INTRAVENOUS at 08:16

## 2023-04-17 RX ADMIN — METHYLPREDNISOLONE SODIUM SUCCINATE 125 MG: 125 INJECTION, POWDER, FOR SOLUTION INTRAMUSCULAR; INTRAVENOUS at 07:43

## 2023-04-17 NOTE — PROGRESS NOTES
Outpatient Infusion Center Progress Note    5004 Pt admit to [facility] for Dose 2/2 induction Ocrevus ambulatory in stable condition. Assessment completed. No new concerns voiced.   Piv established in left ac with good blood return  Visit Vitals  BP (!) 145/73   Pulse 62   Temp 97.7 °F (36.5 °C)   Resp 18   Ht 5' 3\" (1.6 m)   Wt 72.7 kg (160 lb 3.6 oz)   SpO2 98%   BMI 28.38 kg/m²       Medications:  Medications Administered       0.9% sodium chloride infusion       Admin Date  04/17/2023 Action  New Bag Dose  25 mL/hr Rate  25 mL/hr Route  IntraVENous Administered By  Merlene Aden RN              acetaminophen (TYLENOL) tablet 650 mg       Admin Date  04/17/2023 Action  Given Dose  650 mg Route  Oral Administered By  Merlene Aden RN              methylPREDNISolone (PF) (SOLU-MEDROL) injection 125 mg       Admin Date  04/17/2023 Action  Given Dose  125 mg Route  IntraVENous Administered By  Merlene Aden RN              ocrelizumab (OCREVUS) 300 mg in 0.9% sodium chloride 250 mL, overfill volume 25 mL infusion       Admin Date  04/17/2023 Action  New Bag Dose  300 mg Rate  30 mL/hr Route  IntraVENous Administered By  Merlene Aden RN               Admin Date  04/17/2023 Action  Rate Change Dose   Rate  60 mL/hr Route  IntraVENous Administered By  Merlene Aden RN               Admin Date  04/17/2023 Action  Rate Change Dose   Rate  90 mL/hr Route  IntraVENous Administered By  Merlene Aden RN               Admin Date  04/17/2023 Action  Rate Change Dose   Rate  120 mL/hr Route  IntraVENous Administered By  Merlene Aden RN               Admin Date  04/17/2023 Action  Rate Change Dose   Rate  150 mL/hr Route  IntraVENous Administered By  Merlene Aden RN               Admin Date  04/17/2023 Action  Rate Change Dose   Rate  180 mL/hr Route  IntraVENous Administered By  Merlene Aden RN                     8113 Pt tolerated treatment well.piv removed per protocol at end of infusion, she waited 30 minutes post infusion with no reactions noted D/c home ambulatory in no distress. Pt aware of next appointment scheduled for 10/6/23.

## 2023-04-24 DIAGNOSIS — E11.9 TYPE 2 DIABETES MELLITUS WITHOUT COMPLICATION, WITH LONG-TERM CURRENT USE OF INSULIN (HCC): ICD-10-CM

## 2023-04-24 DIAGNOSIS — Z79.4 TYPE 2 DIABETES MELLITUS WITHOUT COMPLICATION, WITH LONG-TERM CURRENT USE OF INSULIN (HCC): ICD-10-CM

## 2023-04-24 NOTE — TELEPHONE ENCOUNTER
----- Message from Nikki Macias sent at 4/24/2023  3:39 PM EDT -----  Subject: Refill Request    QUESTIONS  Name of Medication? pantoprazole (PROTONIX) 40 MG tablet  Patient-reported dosage and instructions? 1 tablet by mouth daily  How many days do you have left? 0  [facility]? [id number]  Pharmacy phone number (if available)? [phone]  ---------------------------------------------------------------------------  --------------,  Name of Medication? SITagliptin (JANUVIA) 100 MG tablet  Patient-reported dosage and instructions? 1 tablet daily by mouth  How many days do you have left? 0  Preferred Pharmacy? [facility] ([address] )  Pharmacy phone number (if available)? [phone]  Additional Information for Provider? PLEASE NOTE, this is a different   pharmacy then the Pantoprazole refill request. This refill is for 90 days. ---------------------------------------------------------------------------  --------------  Sushma HANSEN  What is the best way for the office to contact you? OK to leave message on   voicemail  Preferred Call Back Phone Number? [phone]  ---------------------------------------------------------------------------  --------------  SCRIPT ANSWERS  Relationship to Patient?  Self

## 2023-04-25 RX ORDER — PANTOPRAZOLE SODIUM 40 MG/1
TABLET, DELAYED RELEASE ORAL
Qty: 90 TABLET | Refills: 1 | Status: SHIPPED | OUTPATIENT
Start: 2023-04-25

## 2023-05-04 DIAGNOSIS — F40.240 CLAUSTROPHOBIA: ICD-10-CM

## 2023-05-04 DIAGNOSIS — M81.6 LOCALIZED OSTEOPOROSIS, UNSPECIFIED PATHOLOGICAL FRACTURE PRESENCE: ICD-10-CM

## 2023-05-04 RX ORDER — RALOXIFENE HYDROCHLORIDE 60 MG/1
TABLET, FILM COATED ORAL
Qty: 30 TABLET | Refills: 1 | Status: SHIPPED | OUTPATIENT
Start: 2023-05-04

## 2023-05-04 RX ORDER — LORAZEPAM 0.5 MG/1
TABLET ORAL
Qty: 30 TABLET | Refills: 0 | Status: SHIPPED | OUTPATIENT
Start: 2023-05-04

## 2023-05-16 DIAGNOSIS — K21.9 GASTRO-ESOPHAGEAL REFLUX DISEASE WITHOUT ESOPHAGITIS: Primary | ICD-10-CM

## 2023-05-16 RX ORDER — PANTOPRAZOLE SODIUM 40 MG/1
TABLET, DELAYED RELEASE ORAL
Qty: 90 TABLET | Refills: 1 | Status: SHIPPED | OUTPATIENT
Start: 2023-05-16

## 2023-05-16 NOTE — TELEPHONE ENCOUNTER
pantoprazole (PROTONIX) 40 MG tablet  Last OV: 04/05/2023   Next OV: 10/04/2023      [facility] ) - [facility] [place] [phone] - F [phone]

## 2023-05-16 NOTE — TELEPHONE ENCOUNTER
Requested Prescriptions     Pending Prescriptions Disp Refills    pantoprazole (PROTONIX) 40 MG tablet 90 tablet 0     Sig: TAKE 1 BY MOUTH EVERY MORNING  [facility] ([facility] ) - [address] [phone] Tawnya Vazquez [phone]  [place] [address]  Phone: [phone] Fax: [phone]       Last appt 4/5/2023      Future Appointments   Date Time Provider Phoebe Badillo   10/4/2023  1:45 PM DO SHERIN Patten BS AMB   10/6/2023  8:00 AM F1 [place] [facility] [place]

## 2023-05-17 ENCOUNTER — TELEPHONE (OUTPATIENT)
Age: 73
End: 2023-05-17

## 2023-05-17 DIAGNOSIS — E11.21 TYPE 2 DIABETES WITH NEPHROPATHY (HCC): Primary | ICD-10-CM

## 2023-05-17 RX ORDER — FLURBIPROFEN SODIUM 0.3 MG/ML
SOLUTION/ DROPS OPHTHALMIC
COMMUNITY
Start: 2023-02-08 | End: 2023-05-18 | Stop reason: SDUPTHER

## 2023-05-18 RX ORDER — INSULIN GLARGINE 100 [IU]/ML
INJECTION, SOLUTION SUBCUTANEOUS
Qty: 5 ADJUSTABLE DOSE PRE-FILLED PEN SYRINGE | Refills: 2 | Status: SHIPPED | OUTPATIENT
Start: 2023-05-18

## 2023-05-18 RX ORDER — FLURBIPROFEN SODIUM 0.3 MG/ML
SOLUTION/ DROPS OPHTHALMIC
Qty: 100 EACH | Refills: 2 | Status: SHIPPED | OUTPATIENT
Start: 2023-05-18

## 2023-05-22 DIAGNOSIS — I10 ESSENTIAL HYPERTENSION: Primary | ICD-10-CM

## 2023-05-22 NOTE — TELEPHONE ENCOUNTER
Requested Prescriptions     Pending Prescriptions Disp Refills    cloNIDine (CATAPRES) 0.1 MG tablet [Pharmacy Med Name: cloNIDine HCl 0.1 MG Oral Tablet] 180 tablet 3     Sig: TAKE 1 TABLET BY MOUTH  TWICE DAILY       Last appt 4/5/2023      Future Appointments   Date Time Provider Phoebe Badillo   10/4/2023  1:45 PM DO SHERIN Kitchen BS AMB   10/6/2023  8:00 AM [address] Delivery ([facility] ) - [address] [phone] Светлана Alfredo [phone]  [facility] [address] [place] [address]  Phone: [phone] Fax: [phone]

## 2023-05-24 DIAGNOSIS — E11.21 TYPE 2 DIABETES WITH NEPHROPATHY (HCC): ICD-10-CM

## 2023-05-24 RX ORDER — INSULIN GLARGINE 100 [IU]/ML
INJECTION, SOLUTION SUBCUTANEOUS
Qty: 5 ADJUSTABLE DOSE PRE-FILLED PEN SYRINGE | Refills: 2 | Status: SHIPPED | OUTPATIENT
Start: 2023-05-24

## 2023-05-24 RX ORDER — CLONIDINE HYDROCHLORIDE 0.1 MG/1
TABLET ORAL
Qty: 180 TABLET | Refills: 3 | Status: SHIPPED | OUTPATIENT
Start: 2023-05-24

## 2023-05-24 NOTE — TELEPHONE ENCOUNTER
Pt [place] lantus solostar 100 [place] her way to see her daughter in [place]- pt does not need the needles  Pt has been without this med for 2 days.  If possible please send new script to:    Tristin Nunez [facility]  Phone [phone]

## 2023-06-06 DIAGNOSIS — F32.A DEPRESSION, UNSPECIFIED DEPRESSION TYPE: Primary | ICD-10-CM

## 2023-06-06 RX ORDER — LORAZEPAM 0.5 MG/1
TABLET ORAL
Qty: 30 TABLET | Refills: 0 | Status: SHIPPED | OUTPATIENT
Start: 2023-06-06 | End: 2023-07-05

## 2023-06-06 NOTE — TELEPHONE ENCOUNTER
----- Message from Luanne Harris MA sent at 6/5/2023  3:14 PM EDT -----  Subject: Refill Request    QUESTIONS  Name of Medication? LORazepam (ATIVAN) 0.5 MG tablet  Patient-reported dosage and instructions? 0.5 MG , PRN   How many days do you have left? 0  [facility]? [id number]  Pharmacy phone number (if available)? [phone]  Additional Information for Provider? Out of town at her daughters.   ---------------------------------------------------------------------------  --------------  Joshua WOOD  What is the best way for the office to contact you? Do not leave any   message, patient will call back for answer  Preferred Call Back Phone Number? [phone]  ---------------------------------------------------------------------------  --------------  SCRIPT ANSWERS  Relationship to Patient?  Self

## 2023-06-06 NOTE — TELEPHONE ENCOUNTER
Requested Prescriptions     Pending Prescriptions Disp Refills    LORazepam (ATIVAN) 0.5 MG tablet 30 tablet 0     Sig: TAKE 1 TABLET BY MOUTH DAILY AS NEEDED         [id number] [place] - [address] [facility] [phone] Azalia Gonzalez [phone]  [address] [facility] [address]  Phone: [phone] Fax: [phone]       Last appt 4/5/2023      Future Appointments   Date Time Provider Phoebe Badillo   10/4/2023  1:45 PM DO SHERIN Patten BS AMB   10/6/2023  8:00 AM F1 [place] [facility] [facility] [place]

## 2023-06-07 ENCOUNTER — TELEPHONE (OUTPATIENT)
Age: 73
End: 2023-06-07

## 2023-06-07 NOTE — TELEPHONE ENCOUNTER
Called and spoke to the Pt. Two identifiers verified. Recommended she contact her insurance who can recommend neurologist in the state she has moved to. Pt. Stated an understanding.

## 2023-06-07 NOTE — TELEPHONE ENCOUNTER
Patient has moved out of state and requesting a call to discuss helping her find a neurologist.    Please call to discuss.

## 2023-07-06 DIAGNOSIS — F32.A DEPRESSION, UNSPECIFIED DEPRESSION TYPE: ICD-10-CM

## 2023-07-07 RX ORDER — LORAZEPAM 0.5 MG/1
TABLET ORAL
Qty: 30 TABLET | Refills: 0 | Status: SHIPPED | OUTPATIENT
Start: 2023-07-07 | End: 2023-08-07

## 2023-07-07 RX ORDER — RALOXIFENE HYDROCHLORIDE 60 MG/1
TABLET, FILM COATED ORAL
Qty: 30 TABLET | Refills: 5 | Status: SHIPPED | OUTPATIENT
Start: 2023-07-07

## 2023-08-05 NOTE — CALL BACK NOTE
[facility] Emergency Department Follow Up Call Record Discharged to : Home/Family Home/Home Health/Skilled Facility/Rehab/Assisted Living/Other__Home_____ 1) Did you receive your discharge instructions? Yes, This writer spoke with Suzie Singh who verified her . She reports having minimal bleeding. She is preparing   appointment date with GI. 2) Do you understand them? Yes        
3) Are you able to follow them? Yes If NO, what can I clarify for you? 4) Do you understand your diagnosis? Yes        
5) Do you know which symptoms should prompt you to call the doctor? Yes    
6) Were you able to fill and  any medications that were prescribed? Not applicable 7) You were prescribed __none_________for ____________________. Common side effects of this medication are____________________. This is not a complete list so please review the forms given from the pharmacy for a complete list. 8) Are there any questions about your medications? No     
 
  
 Have you scheduled any recommended doctors appointments (specialty, PCP) Patient reports she is waiting on call back from her GI physician/office for appointment date. If NO, what barriers are you encountering (transportation/lost contact info/cost/ 
didnt think necessary/no PCP 
9) If discharged with Home Health, has the agency contacted you to schedule visit? Not applicable 10) Is there anyone available to help you at home (meals, errands, transportation   
monitoring) (adult children, neighbors, private duty companions) No. 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 11) Are you on a special diet? YES. DM If YES, do you understand the requirements for this diet? YES Education provided? 12) If presented with cough, bronchitis, COPD, asthma, is it ok to ask that the 
 respiratory disease management educator call you? Not applicable Dr. Jeny Snyder office notified that patient couldn't tolerate procedure. Patient in endo for Eft study. Consent form signed. Attempted to  insert Manometry probe into left nare. Pt unable to tolerate procedure. 13)  A) If presented with fall, were you issued an assistive device in the ED Are you using? Not applicable B) If given RX for device, have you obtained? Not applicable If NO, barriers? C) Therapist recommended:NO Are you able to implement the suggestions? Not applicable If NO, barriers to implementation? D) Are you having any difficulties with mobility inside your home?   
 (steps, bed, tub)No 
 If YES, ask if the [facility] PT can contact patient and good time and number? 
14)  At the end of your discharge instructions, there is information about accessing [facility], have you had a chance to review those? Yes Do you have any questions about signing up for this service? NO We encourage our patients to be active participants in their healthcare and this site is one of the ways to do that. It will allow you to access parts of your medical record, email your doctors office, schedule appointments, and request medications refills . 15) Are there any other questions that I can answer for you regarding  
 your Emergency department visit? NO Estimated Call Time:___3:57 PM 
________________ Date/Time:_______________ + mucosal ulcers to mouth  + papules on arms and legs and buttocks

## 2023-08-08 DIAGNOSIS — L85.3 XEROSIS CUTIS: Primary | ICD-10-CM

## 2023-08-08 RX ORDER — AMMONIUM LACTATE 12 G/100G
LOTION TOPICAL
Qty: 225 G | Refills: 1 | Status: SHIPPED | OUTPATIENT
Start: 2023-08-08

## 2023-08-08 NOTE — TELEPHONE ENCOUNTER
----- Message from Ross Bailey sent at 8/8/2023  2:50 PM EDT -----  Subject: Refill Request    QUESTIONS  Name of Medication? ammonium lactate (LAC-HYDRIN) 12 % lotion  Patient-reported dosage and instructions? use twice daily  How many days do you have left? 0  [facility]? [id number]  Pharmacy phone number (if available)? [phone]  Additional Information for Provider? Pt is requesting a 30 day supply of   meds. Pt has 0 days of rx left. Please send rx to pharmacy for pt.   ---------------------------------------------------------------------------  --------------  [address]  What is the best way for the office to contact you? OK to leave message on   voicemail  Preferred Call Back Phone Number? [phone]  ---------------------------------------------------------------------------  --------------  SCRIPT ANSWERS  Relationship to Patient?  Self

## 2023-08-16 DIAGNOSIS — F33.42 RECURRENT MAJOR DEPRESSIVE DISORDER, IN FULL REMISSION (HCC): Primary | ICD-10-CM

## 2023-08-16 RX ORDER — LORAZEPAM 0.5 MG/1
1 TABLET ORAL
COMMUNITY
Start: 2023-02-21 | End: 2023-08-18 | Stop reason: SDUPTHER

## 2023-08-16 NOTE — TELEPHONE ENCOUNTER
----- Message from Radhaconnie Duffy sent at 8/16/2023  2:36 PM EDT -----  Subject: Refill Request    QUESTIONS  Name of Medication? LORazepam (ATIVAN) 0.5 MG tablet  Patient-reported dosage and instructions? LORazepam (ATIVAN) 0.5 MG tablet   1 a day when needed   How many days do you have left? 0  [facility]? [id number]  Pharmacy phone number (if available)? [phone]  Additional Information for Provider? patient is currently residing in [address] [place] [address]   would like the prescription sent to the [facility] given in [place]   ---------------------------------------------------------------------------  --------------  CALL BACK INFO  What is the best way for the office to contact you? OK to leave message on   voicemail  Preferred Call Back Phone Number? [phone]  ---------------------------------------------------------------------------  --------------  SCRIPT ANSWERS  Relationship to Patient?  Self

## 2023-08-16 NOTE — TELEPHONE ENCOUNTER
Requested Prescriptions     Pending Prescriptions Disp Refills    LORazepam (ATIVAN) 0.5 MG tablet 30 tablet 0     Sig: Take 1 tablet by mouth nightly as needed for Anxiety for up to 30 days.  Max Daily Amount: 0.5 mg         [id number] [place] - [address] [facility] [phone] [facility] [phone]  [facility] [place] [address]  Phone: [phone] Fax: [phone]       Last appt 4/5/2023      Future Appointments   Date Time Provider [address] [place] [address]   10/4/2023  1:45 PM Abdoulaye Patiño DO [facility] AMB   10/6/2023  8:00 AM F[facility] [place]

## 2023-08-18 RX ORDER — LORAZEPAM 0.5 MG/1
0.5 TABLET ORAL NIGHTLY PRN
Qty: 15 TABLET | Refills: 0 | Status: SHIPPED | OUTPATIENT
Start: 2023-08-18 | End: 2023-10-18

## 2023-09-14 DIAGNOSIS — F33.42 RECURRENT MAJOR DEPRESSIVE DISORDER, IN FULL REMISSION (HCC): ICD-10-CM

## 2023-09-18 DIAGNOSIS — L85.3 XEROSIS CUTIS: ICD-10-CM

## 2023-09-18 RX ORDER — GLIPIZIDE 10 MG/1
10 TABLET ORAL 2 TIMES DAILY
Qty: 60 TABLET | Refills: 1 | Status: SHIPPED | OUTPATIENT
Start: 2023-09-18

## 2023-09-18 RX ORDER — AMMONIUM LACTATE 12 G/100G
LOTION TOPICAL
Qty: 225 G | Refills: 1 | Status: SHIPPED | OUTPATIENT
Start: 2023-09-18

## 2023-09-18 NOTE — TELEPHONE ENCOUNTER
----- Message from [facility] sent at 9/18/2023 12:37 PM EDT -----  Subject: Refill Request    QUESTIONS  Name of Medication? glipiZIDE (GLUCOTROL) 10 MG tablet  Patient-reported dosage and instructions? TAKE 1 TABLET BY MOUTH TWICE   DAILY  How many days do you have left? 4  Preferred Pharmacy? [facility] ([address] [place] [address])  Pharmacy phone number (if available)? [phone]  Additional Information for Provider? please call if there are any issues   refilling, quintenphilrmely needs to send this to her address in [place]  ---------------------------------------------------------------------------  --------------,  Name of Medication? LORazepam (ATIVAN) 0.5 MG tablet  Patient-reported dosage and instructions? TAKE 1 TABLET BY MOUTH EVERY DAY   AS NEEDED  How many days do you have left? 0  [facility]erred [facility]? [id number]  Pharmacy phone number (if available)? [phone]  Additional Information for Provider? please call if there are any issues   refilling, please pay attention to the pharmacy you send this to.   ---------------------------------------------------------------------------  --------------,  Name of Medication? ammonium lactate (LAC-HYDRIN) 12 % lotion  Patient-reported dosage and instructions? APPLY TOPICALLY AND RUB IN TO   AFFECTED AREA ON LEGS TWICE DAILY  How many days do you have left? 0  [facility]erred [facility]? [id number]  Pharmacy phone number (if available)? [phone]  Additional Information for Provider? she does not like the [facility] brand.   ---------------------------------------------------------------------------  --------------  CALL BACK INFO  What is the best way for the office to contact you? OK to leave message on   voicemail  Preferred Call Back Phone Number? [phone]  ---------------------------------------------------------------------------  --------------  SCRIPT ANSWERS  Relationship to Patient?  Self

## 2023-09-18 NOTE — TELEPHONE ENCOUNTER
----- Message from [facility] sent at 9/18/2023 12:37 PM EDT -----  Subject: Refill Request    QUESTIONS  Name of Medication? glipiZIDE (GLUCOTROL) 10 MG tablet  Patient-reported dosage and instructions? TAKE 1 TABLET BY MOUTH TWICE   DAILY  How many days do you have left? 4  Preferred Pharmacy? [facility] ([address] [place] [address])  Pharmacy phone number (if available)? [phone]  Additional Information for Provider? please call if there are any issues   refilling, quintenphilrmely needs to send this to her address in [place]  ---------------------------------------------------------------------------  --------------,  Name of Medication? LORazepam (ATIVAN) 0.5 MG tablet  Patient-reported dosage and instructions? TAKE 1 TABLET BY MOUTH EVERY DAY   AS NEEDED  How many days do you have left? 0  [facility]? [id number]  Pharmacy phone number (if available)? [phone]  Additional Information for Provider? please call if there are any issues   refilling, please pay attention to the pharmacy you send this to.   ---------------------------------------------------------------------------  --------------,  Name of Medication? ammonium lactate (LAC-HYDRIN) 12 % lotion  Patient-reported dosage and instructions? APPLY TOPICALLY AND RUB IN TO   AFFECTED AREA ON LEGS TWICE DAILY  How many days do you have left? 0  Preferred [facility]? [id number]  Pharmacy phone number (if available)? [phone]  Additional Information for Provider? she does not like the [facility] brand.   ---------------------------------------------------------------------------  --------------  CALL BACK INFO  What is the best way for the office to contact you? OK to leave message on   voicemail  Preferred Call Back Phone Number? [phone]  ---------------------------------------------------------------------------  --------------  SCRIPT ANSWERS  Relationship to Patient?  Self

## 2023-09-19 RX ORDER — LORAZEPAM 0.5 MG/1
0.5 TABLET ORAL NIGHTLY PRN
Qty: 15 TABLET | Refills: 0 | OUTPATIENT
Start: 2023-09-19

## 2023-10-06 ENCOUNTER — APPOINTMENT (OUTPATIENT)
Dept: INFUSION THERAPY | Age: 73
End: 2023-10-06

## 2023-10-09 RX ORDER — SITAGLIPTIN 100 MG/1
100 TABLET, FILM COATED ORAL DAILY
Qty: 90 TABLET | Refills: 1 | Status: SHIPPED | OUTPATIENT
Start: 2023-10-09

## 2023-10-23 DIAGNOSIS — E11.21 TYPE 2 DIABETES WITH NEPHROPATHY (HCC): ICD-10-CM

## 2023-10-24 RX ORDER — INSULIN GLARGINE-YFGN 100 [IU]/ML
INJECTION, SOLUTION SUBCUTANEOUS
Qty: 15 ML | Refills: 2 | Status: SHIPPED | OUTPATIENT
Start: 2023-10-24

## 2023-11-17 DIAGNOSIS — I10 ESSENTIAL HYPERTENSION: Primary | ICD-10-CM

## 2023-11-17 RX ORDER — METOPROLOL SUCCINATE 200 MG/1
TABLET, EXTENDED RELEASE ORAL
Qty: 90 TABLET | Refills: 3 | OUTPATIENT
Start: 2023-11-17

## 2024-02-21 ENCOUNTER — APPOINTMENT (RX ONLY)
Dept: URBAN - METROPOLITAN AREA CLINIC 325 | Facility: CLINIC | Age: 74
Setting detail: DERMATOLOGY
End: 2024-02-21

## 2024-02-21 DIAGNOSIS — L82.0 INFLAMED SEBORRHEIC KERATOSIS: ICD-10-CM | Status: WORSENING

## 2024-02-21 DIAGNOSIS — L91.8 OTHER HYPERTROPHIC DISORDERS OF THE SKIN: ICD-10-CM | Status: WORSENING

## 2024-02-21 PROCEDURE — ? COUNSELING

## 2024-02-21 PROCEDURE — ? ADDITIONAL NOTES

## 2024-02-21 PROCEDURE — 99203 OFFICE O/P NEW LOW 30 MIN: CPT

## 2024-02-21 PROCEDURE — ? FULL BODY SKIN EXAM - DECLINED

## 2024-02-21 PROCEDURE — ? DEFER

## 2024-02-21 ASSESSMENT — LOCATION SIMPLE DESCRIPTION DERM
LOCATION SIMPLE: LEFT AXILLARY VAULT
LOCATION SIMPLE: RIGHT AXILLARY VAULT
LOCATION SIMPLE: LEFT ANTERIOR NECK
LOCATION SIMPLE: RIGHT ANTERIOR NECK

## 2024-02-21 ASSESSMENT — LOCATION DETAILED DESCRIPTION DERM
LOCATION DETAILED: LEFT INFERIOR ANTERIOR NECK
LOCATION DETAILED: RIGHT AXILLARY VAULT
LOCATION DETAILED: RIGHT INFERIOR ANTERIOR NECK
LOCATION DETAILED: LEFT AXILLARY VAULT

## 2024-02-21 ASSESSMENT — LOCATION ZONE DERM
LOCATION ZONE: AXILLAE
LOCATION ZONE: NECK

## 2024-02-21 NOTE — PROCEDURE: DEFER
Introduction Text (Please End With A Colon): The following procedure was deferred:
Detail Level: Zone
Size Of Lesion In Cm (Optional): 0
Instructions (Optional): Cash pay $168
Procedure To Be Performed At Next Visit: Skin Tag removal (Cosmetic)
Detail Level: Detailed
Procedure To Be Performed At Next Visit: Electrodesiccation

## 2024-02-21 NOTE — PROCEDURE: ADDITIONAL NOTES
Additional Notes: NOT COVERED BY INSURANCE $168 FOR ISKS- ELECTRODESSICATION AND SNIPPING OF SKIN TAGS. 1 MONTH FREE TOUCH UP.
Render Risk Assessment In Note?: no
Detail Level: Detailed

## 2024-03-25 DIAGNOSIS — E11.21 TYPE 2 DIABETES WITH NEPHROPATHY (HCC): ICD-10-CM

## 2024-03-26 DIAGNOSIS — E11.21 TYPE 2 DIABETES WITH NEPHROPATHY (HCC): ICD-10-CM

## 2024-03-26 RX ORDER — FLURBIPROFEN SODIUM 0.3 MG/ML
SOLUTION/ DROPS OPHTHALMIC
Refills: 4 | OUTPATIENT
Start: 2024-03-26

## 2024-03-27 RX ORDER — FLURBIPROFEN SODIUM 0.3 MG/ML
SOLUTION/ DROPS OPHTHALMIC
Qty: 100 EACH | Refills: 4 | Status: SHIPPED | OUTPATIENT
Start: 2024-03-27

## 2024-12-09 ENCOUNTER — TELEPHONE (OUTPATIENT)
Age: 74
End: 2024-12-09

## 2024-12-09 NOTE — TELEPHONE ENCOUNTER
Patient would like a call back to discuss if she can be seen sooner than July by MEHNAZ Traylor.    She states this is due to a recent infusion that she would also like a call back to discuss.

## 2024-12-11 ENCOUNTER — TELEPHONE (OUTPATIENT)
Age: 74
End: 2024-12-11

## 2024-12-11 NOTE — TELEPHONE ENCOUNTER
Patient is requesting sooner appt. I suggested 4/30/25 as the soonest appt available, but patient states she cannot wait that long and needs to get back on infusions.    Please contact

## 2024-12-12 NOTE — TELEPHONE ENCOUNTER
Called patient. Informed her unfortunately, April is the soonest we have. Scheduled her for 04/30/25 at 2PM. Placed on wait list.

## 2025-01-23 ENCOUNTER — TELEPHONE (OUTPATIENT)
Age: 75
End: 2025-01-23

## 2025-01-23 NOTE — TELEPHONE ENCOUNTER
Patient would like a call back to see if she can be worked in sooner as she is overdue for her MS infusion.

## 2025-01-23 NOTE — TELEPHONE ENCOUNTER
Called patient. Informed her that we had a cancellation and offered 02/10/25 at 10:40AM. Arrival time of 10:25AM. Address given on phone.

## 2025-02-10 ENCOUNTER — TELEPHONE (OUTPATIENT)
Age: 75
End: 2025-02-10

## 2025-02-10 ENCOUNTER — OFFICE VISIT (OUTPATIENT)
Age: 75
End: 2025-02-10
Payer: MEDICAID

## 2025-02-10 VITALS
BODY MASS INDEX: 25.61 KG/M2 | WEIGHT: 150 LBS | HEART RATE: 86 BPM | OXYGEN SATURATION: 96 % | SYSTOLIC BLOOD PRESSURE: 118 MMHG | HEIGHT: 64 IN | DIASTOLIC BLOOD PRESSURE: 62 MMHG

## 2025-02-10 DIAGNOSIS — G35 MULTIPLE SCLEROSIS (HCC): ICD-10-CM

## 2025-02-10 DIAGNOSIS — B19.10 HEPATITIS B INFECTION WITHOUT DELTA AGENT WITHOUT HEPATIC COMA, UNSPECIFIED CHRONICITY: ICD-10-CM

## 2025-02-10 DIAGNOSIS — G35 MULTIPLE SCLEROSIS (HCC): Primary | ICD-10-CM

## 2025-02-10 PROCEDURE — 99215 OFFICE O/P EST HI 40 MIN: CPT | Performed by: PSYCHIATRY & NEUROLOGY

## 2025-02-10 PROCEDURE — 3074F SYST BP LT 130 MM HG: CPT | Performed by: PSYCHIATRY & NEUROLOGY

## 2025-02-10 PROCEDURE — 3078F DIAST BP <80 MM HG: CPT | Performed by: PSYCHIATRY & NEUROLOGY

## 2025-02-10 PROCEDURE — 1123F ACP DISCUSS/DSCN MKR DOCD: CPT | Performed by: PSYCHIATRY & NEUROLOGY

## 2025-02-10 RX ORDER — LOSARTAN POTASSIUM 100 MG/1
100 TABLET ORAL DAILY
COMMUNITY
Start: 2024-12-11

## 2025-02-10 RX ORDER — DENOSUMAB 60 MG/ML
60 INJECTION SUBCUTANEOUS ONCE
COMMUNITY

## 2025-02-10 NOTE — TELEPHONE ENCOUNTER
Please contact patient.  She is very worried that she can't see the liver spec. Until June.  Can you or Dr. Roman see if she can be seen earlier?     Thanks!

## 2025-02-10 NOTE — PROGRESS NOTES
Neurology Clinic Follow up Note    Patient ID:  Nancy Duran  [id number]  70 y.o.  1950      Ms. Duran is here for follow up today of MS     Last Appointment:  2/2023, SILVESTRE Ureña    Interval History:   Nancy Duran is a right handed female with a PMH including DM, HTN, HCV, remote stroke presenting for f/u of MS.      Date of onset: 1997-experienced knee buckling with falls, paresthesias L side later progressing to both feet, +fatigue, +optic neuritis b/l  Dx made via MRI and LP.      Date of diagnosis: 1999    Disease course from Onset: RRMS    Disease course last year: RRMS, stable    Last imaging:   MRI Brain [facility] 7/10/20: stable MS lesion burden, no evidence of active demyelination  MRI Brain [facility] 12/29/19  Moderately extensive white matter disease, c/w multiple sclerosis, possible slight increase in lesions of the corpus callosum.  No enhancing lesions.    [facility] MRI Brain summer 2024 per pt-no records available for review     Medications for MS Used in the Past:   Vumerity (2/2020-2/2023)  Avonex- doesn't like injections due to injection site tenderness  Tecfidera- could not swallow pills, +nausea, flushing    Interval history:  Patient lost to f/u for over 2 years. She moved to [place] over the past year but is now back to help her youngest daughter who lives in [place].   She was being seen locally in [place] by Neurology. She was maintained on Ocrevus infusions with last infusion ~5/2024. No recent MS relapses reported. She does report recent findings of Hep B, unclear if new diagnosis or reactivation. H/O HCV. Not followed by Hepatology presently.   Denies new weakness, numbness/tingling but reports occasional cramping into the distal LE b/l. No new vision loss, gait instability, urinary/bowel incontinence, +nocturia. Admits to some forgetfulness.   She mentions some occasional anxiety as well, maintained on lorazepam PRN.      PMHx/ PSHx/ FHx/ SHx:  Reviewed and unchanged previous visit.   Past

## 2025-02-11 ENCOUNTER — TELEPHONE (OUTPATIENT)
Age: 75
End: 2025-02-11

## 2025-02-11 LAB
ALBUMIN SERPL-MCNC: 4.1 G/DL (ref 3.8–4.8)
ALP SERPL-CCNC: 103 IU/L (ref 44–121)
ALT SERPL-CCNC: 32 IU/L (ref 0–32)
AST SERPL-CCNC: 30 IU/L (ref 0–40)
BILIRUB SERPL-MCNC: 0.3 MG/DL (ref 0–1.2)
BUN SERPL-MCNC: 10 MG/DL (ref 8–27)
BUN/CREAT SERPL: 11 (ref 12–28)
CALCIUM SERPL-MCNC: 9.4 MG/DL (ref 8.7–10.3)
CHLORIDE SERPL-SCNC: 101 MMOL/L (ref 96–106)
CO2 SERPL-SCNC: 24 MMOL/L (ref 20–29)
CREAT SERPL-MCNC: 0.89 MG/DL (ref 0.57–1)
EGFRCR SERPLBLD CKD-EPI 2021: 69 ML/MIN/1.73
GLOBULIN SER CALC-MCNC: 2.4 G/DL (ref 1.5–4.5)
GLUCOSE SERPL-MCNC: 155 MG/DL (ref 70–99)
HBV CORE AB SERPL QL IA: POSITIVE
POTASSIUM SERPL-SCNC: 3.8 MMOL/L (ref 3.5–5.2)
PROT SERPL-MCNC: 6.5 G/DL (ref 6–8.5)
SODIUM SERPL-SCNC: 140 MMOL/L (ref 134–144)

## 2025-02-11 NOTE — TELEPHONE ENCOUNTER
----- Message from Dr. Sarah Roman DO sent at 2/11/2025  9:35 AM EST -----  Glucose is elevated. HBV positive-please call Hepatology to see if she can be seen sooner and fax results to her PCP as well. Thanks, RMD  ----- Message -----  From: Bridger Rivero Incoming Amb Ref Lab Orders To [facility]  Sent: 2/11/2025   7:37 AM EST  To: Sarah Roman DO

## 2025-02-11 NOTE — TELEPHONE ENCOUNTER
Sent the below message via Pt my chart. Also called and spoke with the Pt.   Per Dr. Roman:  Glucose is elevated. HBV positive-please call Hepatology to see if she can be seen sooner and fax results to her PCP as well. Thanks, RMD     Faxed to her New PCP Dr. Pickard for her new pt appt. Scheduled 2/21/2025

## 2025-02-12 ENCOUNTER — TELEPHONE (OUTPATIENT)
Age: 75
End: 2025-02-12

## 2025-02-12 LAB
DIAGNOSTIC IMP SPEC-IMP: NORMAL
HAV IGM SERPL QL IA: NEGATIVE
HBV CORE IGM SERPL QL IA: NEGATIVE
HBV SURFACE AG SERPL QL IA: NEGATIVE
HCV IGG SERPL QL IA: REACTIVE
HCV RNA SERPL NAA+PROBE-ACNC: NORMAL IU/ML
REF LAB TEST REF RANGE: NORMAL

## 2025-02-12 NOTE — TELEPHONE ENCOUNTER
Patient states [facility] is requesting lab results and LOV notes be faxed to: [phone]  Pt scheduled for 2/18/25

## 2025-02-13 NOTE — TELEPHONE ENCOUNTER
Faxed the requested records to the number provided below [facility].. also called the Pt. To notify her. Also asked if this was for the [facility]? Asked her to call and let me know.

## 2025-02-24 ENCOUNTER — CLINICAL DOCUMENTATION (OUTPATIENT)
Age: 75
End: 2025-02-24

## 2025-02-24 DIAGNOSIS — G35 MULTIPLE SCLEROSIS (HCC): Primary | ICD-10-CM

## 2025-02-24 NOTE — PROGRESS NOTES
2/24/25 0931: New pt referral received. Referred by Sarah Roman DO at [facility] Neurology. Dx: Hep B. Routine appt. Records in EPIC

## 2025-02-24 NOTE — TELEPHONE ENCOUNTER
Patient called to request a refill of her lorazepam 5 mg to be filled in advance of her MRI tomorrow.    Please send this to the [facility] on file.

## 2025-02-25 ENCOUNTER — TELEPHONE (OUTPATIENT)
Age: 75
End: 2025-02-25

## 2025-02-25 ENCOUNTER — HOSPITAL ENCOUNTER (OUTPATIENT)
Age: 75
Discharge: HOME | End: 2025-02-28

## 2025-02-25 DIAGNOSIS — G35 MULTIPLE SCLEROSIS (HCC): ICD-10-CM

## 2025-02-25 NOTE — TELEPHONE ENCOUNTER
Patient states she had to reschedule MRI after  not being able to go through with it. Patient is requesting medication to help with anxiety but wants it transmitted through IV. States pills will not work.

## 2025-02-26 RX ORDER — LORAZEPAM 0.5 MG/1
TABLET ORAL
Qty: 2 TABLET | Refills: 0 | Status: SHIPPED | OUTPATIENT
Start: 2025-02-26 | End: 2025-03-07

## 2025-03-04 ENCOUNTER — OFFICE VISIT (OUTPATIENT)
Facility: CLINIC | Age: 75
End: 2025-03-04

## 2025-03-04 VITALS
DIASTOLIC BLOOD PRESSURE: 65 MMHG | SYSTOLIC BLOOD PRESSURE: 129 MMHG | TEMPERATURE: 97.9 F | RESPIRATION RATE: 16 BRPM | WEIGHT: 150.6 LBS | OXYGEN SATURATION: 96 % | BODY MASS INDEX: 25.71 KG/M2 | HEIGHT: 64 IN | HEART RATE: 62 BPM

## 2025-03-04 DIAGNOSIS — M81.0 AGE-RELATED OSTEOPOROSIS WITHOUT CURRENT PATHOLOGICAL FRACTURE: ICD-10-CM

## 2025-03-04 DIAGNOSIS — Z12.11 COLON CANCER SCREENING: ICD-10-CM

## 2025-03-04 DIAGNOSIS — Z12.31 ENCOUNTER FOR SCREENING MAMMOGRAM FOR MALIGNANT NEOPLASM OF BREAST: ICD-10-CM

## 2025-03-04 DIAGNOSIS — N18.32 TYPE 2 DIABETES MELLITUS WITH STAGE 3B CHRONIC KIDNEY DISEASE, WITH LONG-TERM CURRENT USE OF INSULIN (HCC): ICD-10-CM

## 2025-03-04 DIAGNOSIS — Z13.820 SCREENING FOR OSTEOPOROSIS: ICD-10-CM

## 2025-03-04 DIAGNOSIS — I10 ESSENTIAL HYPERTENSION: Primary | ICD-10-CM

## 2025-03-04 DIAGNOSIS — M05.79 RHEUMATOID ARTHRITIS WITH RHEUMATOID FACTOR OF MULTIPLE SITES WITHOUT ORGAN OR SYSTEMS INVOLVEMENT (HCC): ICD-10-CM

## 2025-03-04 DIAGNOSIS — Z01.419 WELL WOMAN EXAM: ICD-10-CM

## 2025-03-04 DIAGNOSIS — E66.3 OVERWEIGHT (BMI 25.0-29.9): ICD-10-CM

## 2025-03-04 DIAGNOSIS — B18.2 CHRONIC HEPATITIS C WITHOUT HEPATIC COMA (HCC): ICD-10-CM

## 2025-03-04 DIAGNOSIS — Z79.4 TYPE 2 DIABETES MELLITUS WITH STAGE 3B CHRONIC KIDNEY DISEASE, WITH LONG-TERM CURRENT USE OF INSULIN (HCC): ICD-10-CM

## 2025-03-04 DIAGNOSIS — F41.9 ANXIETY: ICD-10-CM

## 2025-03-04 DIAGNOSIS — E11.22 TYPE 2 DIABETES MELLITUS WITH STAGE 3B CHRONIC KIDNEY DISEASE, WITH LONG-TERM CURRENT USE OF INSULIN (HCC): ICD-10-CM

## 2025-03-04 PROBLEM — R07.9 CHEST PAIN: Status: RESOLVED | Noted: 2017-12-29 | Resolved: 2025-03-04

## 2025-03-04 PROBLEM — K05.10 GINGIVITIS: Status: RESOLVED | Noted: 2023-04-05 | Resolved: 2025-03-04

## 2025-03-04 PROBLEM — R06.09 DOE (DYSPNEA ON EXERTION): Status: RESOLVED | Noted: 2018-05-14 | Resolved: 2025-03-04

## 2025-03-04 PROBLEM — E11.9 TYPE 2 DIABETES MELLITUS WITHOUT COMPLICATION, WITH LONG-TERM CURRENT USE OF INSULIN: Status: RESOLVED | Noted: 2019-02-25 | Resolved: 2025-03-04

## 2025-03-04 PROBLEM — M79.642 BILATERAL HAND PAIN: Status: RESOLVED | Noted: 2021-05-13 | Resolved: 2025-03-04

## 2025-03-04 PROBLEM — M79.641 BILATERAL HAND PAIN: Status: RESOLVED | Noted: 2021-05-13 | Resolved: 2025-03-04

## 2025-03-04 PROBLEM — E11.21 TYPE 2 DIABETES WITH NEPHROPATHY (HCC): Status: RESOLVED | Noted: 2020-02-17 | Resolved: 2025-03-04

## 2025-03-04 RX ORDER — OCRELIZUMAB 300 MG/10ML
300 INJECTION INTRAVENOUS ONCE
COMMUNITY

## 2025-03-04 RX ORDER — HYDRALAZINE HYDROCHLORIDE 25 MG/1
25 TABLET, FILM COATED ORAL 3 TIMES DAILY
COMMUNITY

## 2025-03-04 RX ORDER — ATORVASTATIN CALCIUM 40 MG/1
40 TABLET, FILM COATED ORAL
COMMUNITY
Start: 2025-01-15 | End: 2025-04-15

## 2025-03-04 RX ORDER — EMPAGLIFLOZIN 10 MG/1
10 TABLET, FILM COATED ORAL DAILY
COMMUNITY

## 2025-03-04 RX ORDER — INSULIN GLARGINE 100 [IU]/ML
INJECTION, SOLUTION SUBCUTANEOUS
COMMUNITY
Start: 2024-12-11

## 2025-03-04 RX ORDER — ACYCLOVIR 400 MG/1
TABLET ORAL
COMMUNITY

## 2025-03-04 RX ORDER — PANTOPRAZOLE SODIUM 20 MG/1
20 TABLET, DELAYED RELEASE ORAL DAILY
COMMUNITY
Start: 2025-01-03

## 2025-03-04 ASSESSMENT — PATIENT HEALTH QUESTIONNAIRE - PHQ9
SUM OF ALL RESPONSES TO PHQ QUESTIONS 1-9: 0
2. FEELING DOWN, DEPRESSED OR HOPELESS: NOT AT ALL
1. LITTLE INTEREST OR PLEASURE IN DOING THINGS: NOT AT ALL

## 2025-03-04 NOTE — PROGRESS NOTES
Nancy Duran is a 74 y.o. female    Chief Complaint   Patient presents with    New Patient       /65   Pulse 62   Temp 97.9 °F (36.6 °C)   Resp 16   Ht 1.626 m (5' 4\")   Wt 68.3 kg (150 lb 9.6 oz)   SpO2 96%   BMI 25.85 kg/m²         1. Have you been to the ER, urgent care clinic since your last visit?  Hospitalized since your last visit? No    2. Have you seen or consulted any other health care providers outside of the [facility] since your last visit?  Include any pap smears or colon screening. No    Learning Assessment:       No data to display                Fall Risk Assessment:      3/4/2025     3:41 PM 4/5/2023     1:35 PM 1/6/2023    11:30 AM 3/9/2022    11:29 AM 5/13/2021    11:00 AM   Amb Fall Risk Assessment and TUG Test   Do you feel unsteady or are you worried about falling?  no       2 or more falls in past year? no       Fall with injury in past year? no       Fall in past 12 months?  0 0 0 0   Able to walk?  Yes Yes Yes Yes       Abuse Screening:       No data to display                ADL Screening:       No data to display                
cyst.  Orders:  -     Sarah Mccann MD, Ob-Gyn, [place]         Follow-up and Dispositions:  Return in about 2 weeks (around 3/18/2025) for skin tag removal, diabetic foot exam.       I have reviewed the patient's medical history in detail and updated the computerized patient record.      We had a prolonged discussion about these complex clinical issues and went over the various important aspects to consider. All questions were answered.      Advised the patient to call back or return to office if symptoms do not improve, change in nature, or persist.     The patient was given an after visit summary or informed of [employer] Access which includes patient instructions, diagnoses, current medications, & vitals.    Patient expressed understanding with the diagnosis and plan.        Josefina Gamez MD

## 2025-03-04 NOTE — PATIENT INSTRUCTIONS
You have been referred to: endocrinology, gynecologist   **Please allow up to 2 weeks for their office to call you and schedule. If you have not heard from them beyond 2 weeks, contact their office directly to schedule an appointment.      Prolia injection will be ordered.     Cologuard has been ordered.     Please call Central Scheduling [phone] to schedule your: DEXA bone scan, mammogram     The following medication/s has been sent to your pharmacy:  Atarax (for anxiety)

## 2025-03-05 LAB
ANION GAP SERPL CALC-SCNC: 8 MMOL/L (ref 2–12)
BASOPHILS # BLD: 0.04 K/UL (ref 0–0.1)
BASOPHILS NFR BLD: 0.8 % (ref 0–1)
BUN SERPL-MCNC: 11 MG/DL (ref 6–20)
BUN/CREAT SERPL: 10 (ref 12–20)
CALCIUM SERPL-MCNC: 9.7 MG/DL (ref 8.5–10.1)
CHLORIDE SERPL-SCNC: 106 MMOL/L (ref 97–108)
CHOLEST SERPL-MCNC: 151 MG/DL
CO2 SERPL-SCNC: 27 MMOL/L (ref 21–32)
CREAT SERPL-MCNC: 1.07 MG/DL (ref 0.55–1.02)
CREAT UR-MCNC: 82.4 MG/DL
DIFFERENTIAL METHOD BLD: ABNORMAL
EOSINOPHIL # BLD: 0.17 K/UL (ref 0–0.4)
EOSINOPHIL NFR BLD: 3.3 % (ref 0–7)
ERYTHROCYTE [DISTWIDTH] IN BLOOD BY AUTOMATED COUNT: 13.7 % (ref 11.5–14.5)
EST. AVERAGE GLUCOSE BLD GHB EST-MCNC: 154 MG/DL
GLUCOSE SERPL-MCNC: 227 MG/DL (ref 65–100)
HBA1C MFR BLD: 7 % (ref 4–5.6)
HCT VFR BLD AUTO: 45.5 % (ref 35–47)
HDLC SERPL-MCNC: 64 MG/DL
HDLC SERPL: 2.4 (ref 0–5)
HGB BLD-MCNC: 14 G/DL (ref 11.5–16)
IMM GRANULOCYTES # BLD AUTO: 0.02 K/UL (ref 0–0.04)
IMM GRANULOCYTES NFR BLD AUTO: 0.4 % (ref 0–0.5)
LDLC SERPL CALC-MCNC: 65.6 MG/DL (ref 0–100)
LYMPHOCYTES # BLD: 1.32 K/UL (ref 0.8–3.5)
LYMPHOCYTES NFR BLD: 25.7 % (ref 12–49)
MCH RBC QN AUTO: 25.9 PG (ref 26–34)
MCHC RBC AUTO-ENTMCNC: 30.8 G/DL (ref 30–36.5)
MCV RBC AUTO: 84.3 FL (ref 80–99)
MICROALBUMIN UR-MCNC: 22.7 MG/DL
MICROALBUMIN/CREAT UR-RTO: 275 MG/G (ref 0–30)
MONOCYTES # BLD: 0.67 K/UL (ref 0–1)
MONOCYTES NFR BLD: 13 % (ref 5–13)
NEUTS SEG # BLD: 2.92 K/UL (ref 1.8–8)
NEUTS SEG NFR BLD: 56.8 % (ref 32–75)
NRBC # BLD: 0 K/UL (ref 0–0.01)
NRBC BLD-RTO: 0 PER 100 WBC
PLATELET # BLD AUTO: 250 K/UL (ref 150–400)
PMV BLD AUTO: 12.2 FL (ref 8.9–12.9)
POTASSIUM SERPL-SCNC: 3.8 MMOL/L (ref 3.5–5.1)
RBC # BLD AUTO: 5.4 M/UL (ref 3.8–5.2)
SODIUM SERPL-SCNC: 141 MMOL/L (ref 136–145)
TRIGL SERPL-MCNC: 107 MG/DL
VLDLC SERPL CALC-MCNC: 21.4 MG/DL
WBC # BLD AUTO: 5.1 K/UL (ref 3.6–11)

## 2025-03-06 PROBLEM — Z12.31 ENCOUNTER FOR SCREENING MAMMOGRAM FOR MALIGNANT NEOPLASM OF BREAST: Status: ACTIVE | Noted: 2025-03-06

## 2025-03-06 PROBLEM — Z01.419 WELL WOMAN EXAM: Status: ACTIVE | Noted: 2025-03-06

## 2025-03-06 PROBLEM — F41.9 ANXIETY: Status: ACTIVE | Noted: 2025-03-06

## 2025-03-06 PROBLEM — Z12.11 COLON CANCER SCREENING: Status: ACTIVE | Noted: 2025-03-06

## 2025-03-06 PROBLEM — Z13.820 SCREENING FOR OSTEOPOROSIS: Status: ACTIVE | Noted: 2025-03-06

## 2025-03-06 RX ORDER — HYDROXYZINE HYDROCHLORIDE 25 MG/1
25 TABLET, FILM COATED ORAL NIGHTLY
Qty: 30 TABLET | Refills: 0 | Status: SHIPPED | OUTPATIENT
Start: 2025-03-06 | End: 2025-04-05

## 2025-03-06 NOTE — ASSESSMENT & PLAN NOTE
Patient's diabetes is well-controlled.  Continue current regimen.  She has a Dexcom which monitors her blood Glucose readings, they have been stable.  Will refer to endocrinology to reestablish care in this area.

## 2025-03-06 NOTE — ASSESSMENT & PLAN NOTE
Last DEXA bone scan completed in 2017, osteoporosis detected, she does receive Prolia every 6 months.  Repeat DEXA bone scan ordered today for reassessment.

## 2025-03-06 NOTE — ASSESSMENT & PLAN NOTE
History of osteoporosis, she receives Prolia every 6 months.  She has relocated to this area and needs to reestablish care with infusion center.  Will refer to our infusion center.  Will refer to endocrinology.  Continue vitamin D and calcium supplementation, in addition to weightbearing exercises.

## 2025-03-06 NOTE — ASSESSMENT & PLAN NOTE
History of chronic hepatitis C, patient recently diagnosed with hepatitis B.  She follows closely with [facility] hepatology.  Keep scheduled follow-up.

## 2025-03-06 NOTE — ASSESSMENT & PLAN NOTE
Patient experiencing intermittent anxiety, she previously took lorazepam to help minimize her symptoms.  Given her age we discussed the risk versus benefit of continuing this medication.  Will trial patient on Atarax.  Follow-up in 2 weeks to reassess symptoms.

## 2025-03-12 ENCOUNTER — HOSPITAL ENCOUNTER (OUTPATIENT)
Age: 75
Discharge: HOME | End: 2025-03-15

## 2025-03-12 ENCOUNTER — TELEPHONE (OUTPATIENT)
Age: 75
End: 2025-03-12

## 2025-03-12 NOTE — TELEPHONE ENCOUNTER
Patient called in stating that she was unable to complete her MRI at [facility] due to clostrophobia even with medication. She would like a call to discuss other options.

## 2025-03-14 ENCOUNTER — TELEPHONE (OUTPATIENT)
Age: 75
End: 2025-03-14

## 2025-03-14 RX ORDER — LOSARTAN POTASSIUM 100 MG/1
100 TABLET ORAL DAILY
Qty: 90 TABLET | Refills: 1 | Status: SHIPPED | OUTPATIENT
Start: 2025-03-14

## 2025-03-14 RX ORDER — METOPROLOL SUCCINATE 200 MG/1
200 TABLET, EXTENDED RELEASE ORAL DAILY
Qty: 90 TABLET | Refills: 1 | Status: SHIPPED | OUTPATIENT
Start: 2025-03-14

## 2025-03-14 NOTE — TELEPHONE ENCOUNTER
Patient states she was not able to get her MRI done due to claustrophobia (medication did not help).    Patient did have her blood work done.    Patient would like a call back to let her know when she can have her OCREVUS infusion scheduled.

## 2025-03-14 NOTE — TELEPHONE ENCOUNTER
Patient is requesting refills on her medication.       PCP: Josefina Gamez MD    Last appt:   3/4/2025    Future Appointments   Date Time Provider Department Center   3/25/2025 10:15 AM Josefina Gamez MD [facility]   4/30/2025  2:00 PM Sarah Roman DO [facility]       Requested Prescriptions     Pending Prescriptions Disp Refills    metoprolol succinate (TOPROL XL) 200 MG extended release tablet 30 tablet      Sig: Take 1 tablet by mouth daily    losartan (COZAAR) 100 MG tablet 30 tablet      Sig: Take 1 tablet by mouth daily

## 2025-03-17 DIAGNOSIS — G35 MULTIPLE SCLEROSIS (HCC): Primary | ICD-10-CM

## 2025-03-17 NOTE — TELEPHONE ENCOUNTER
Called the Pt. However had to leave a voice mail with the following information:  Per  :    MRI Brain re-ordered with IV sedation. Cannot resume Ocrevus due to Hepatitis reactivation. RMD     Also gave the number to call for scheduling.

## 2025-03-25 ENCOUNTER — OFFICE VISIT (OUTPATIENT)
Facility: CLINIC | Age: 75
End: 2025-03-25
Payer: MEDICARE

## 2025-03-25 VITALS
HEART RATE: 61 BPM | DIASTOLIC BLOOD PRESSURE: 76 MMHG | RESPIRATION RATE: 20 BRPM | WEIGHT: 148.9 LBS | HEIGHT: 64 IN | TEMPERATURE: 97.4 F | BODY MASS INDEX: 25.42 KG/M2 | OXYGEN SATURATION: 97 % | SYSTOLIC BLOOD PRESSURE: 134 MMHG

## 2025-03-25 DIAGNOSIS — L91.8 MULTIPLE ACQUIRED SKIN TAGS: Primary | ICD-10-CM

## 2025-03-25 PROCEDURE — 99214 OFFICE O/P EST MOD 30 MIN: CPT | Performed by: STUDENT IN AN ORGANIZED HEALTH CARE EDUCATION/TRAINING PROGRAM

## 2025-03-25 PROCEDURE — 1159F MED LIST DOCD IN RCRD: CPT | Performed by: STUDENT IN AN ORGANIZED HEALTH CARE EDUCATION/TRAINING PROGRAM

## 2025-03-25 PROCEDURE — 1126F AMNT PAIN NOTED NONE PRSNT: CPT | Performed by: STUDENT IN AN ORGANIZED HEALTH CARE EDUCATION/TRAINING PROGRAM

## 2025-03-25 PROCEDURE — 3078F DIAST BP <80 MM HG: CPT | Performed by: STUDENT IN AN ORGANIZED HEALTH CARE EDUCATION/TRAINING PROGRAM

## 2025-03-25 PROCEDURE — G8399 PT W/DXA RESULTS DOCUMENT: HCPCS | Performed by: STUDENT IN AN ORGANIZED HEALTH CARE EDUCATION/TRAINING PROGRAM

## 2025-03-25 PROCEDURE — 3017F COLORECTAL CA SCREEN DOC REV: CPT | Performed by: STUDENT IN AN ORGANIZED HEALTH CARE EDUCATION/TRAINING PROGRAM

## 2025-03-25 PROCEDURE — 1090F PRES/ABSN URINE INCON ASSESS: CPT | Performed by: STUDENT IN AN ORGANIZED HEALTH CARE EDUCATION/TRAINING PROGRAM

## 2025-03-25 PROCEDURE — 3075F SYST BP GE 130 - 139MM HG: CPT | Performed by: STUDENT IN AN ORGANIZED HEALTH CARE EDUCATION/TRAINING PROGRAM

## 2025-03-25 PROCEDURE — 1123F ACP DISCUSS/DSCN MKR DOCD: CPT | Performed by: STUDENT IN AN ORGANIZED HEALTH CARE EDUCATION/TRAINING PROGRAM

## 2025-03-25 PROCEDURE — G8419 CALC BMI OUT NRM PARAM NOF/U: HCPCS | Performed by: STUDENT IN AN ORGANIZED HEALTH CARE EDUCATION/TRAINING PROGRAM

## 2025-03-25 PROCEDURE — G8427 DOCREV CUR MEDS BY ELIG CLIN: HCPCS | Performed by: STUDENT IN AN ORGANIZED HEALTH CARE EDUCATION/TRAINING PROGRAM

## 2025-03-25 PROCEDURE — 1036F TOBACCO NON-USER: CPT | Performed by: STUDENT IN AN ORGANIZED HEALTH CARE EDUCATION/TRAINING PROGRAM

## 2025-03-25 PROCEDURE — 1160F RVW MEDS BY RX/DR IN RCRD: CPT | Performed by: STUDENT IN AN ORGANIZED HEALTH CARE EDUCATION/TRAINING PROGRAM

## 2025-03-25 SDOH — ECONOMIC STABILITY: FOOD INSECURITY: WITHIN THE PAST 12 MONTHS, THE FOOD YOU BOUGHT JUST DIDN'T LAST AND YOU DIDN'T HAVE MONEY TO GET MORE.: NEVER TRUE

## 2025-03-25 SDOH — ECONOMIC STABILITY: FOOD INSECURITY: WITHIN THE PAST 12 MONTHS, YOU WORRIED THAT YOUR FOOD WOULD RUN OUT BEFORE YOU GOT MONEY TO BUY MORE.: NEVER TRUE

## 2025-03-25 NOTE — PROGRESS NOTES
Nacny Duran a 74 y.o. female  has a past medical history of Arrhythmia, Arthritis, Autoimmune disease, Chronic pain, Depression, Diabetes (HCC), GERD (gastroesophageal reflux disease), Hypertension, LBP (low back pain), Lipoma, Liver disease, MS (multiple sclerosis) (HCC), Other ill-defined conditions(799.89), Other ill-defined conditions(799.89), Psychiatric disorder, PUD (peptic ulcer disease), Stroke (HCC), Type 2 diabetes mellitus without complication, with long-term current use of insulin (HCC), and Urinary incontinence. presents to office today for skin tag removal.     Subjective:    Skin Tags  - patient presents to office today for skin tag removal.   - left lower abdomen (1)  - right lower back (1)  - left axilla (2)  - right axilla (1)    _________________    MS  - dx: 1999  - follows with neurology, last seen 2/10/25. Plans to complete MRI zoila 4/2/25 with office follow up 4/30/25. Plans to abstain from restarting Ocrevus infusion due to recent Hep B diagnosis.     Osteoporosis, relocated back to [facility], received Prolia injections, referred to endo during last office visit to re-establish for continuity of care, still needs to schedule     DEXA bone scan, ordered during last office visit, still needs to schedule    Mammogram, ordered during last office visit, still needs to schedule                 Health Maintenance   Topic Date Due    Diabetic retinal exam  Never done    DTaP/Tdap/Td vaccine (1 - Tdap) Never done    Hepatitis A vaccine (2 of 2 - Risk 2-dose series) 03/16/2012    Hepatitis B vaccine (1 of 3 - Risk 3-dose series) Never done    Breast cancer screen  03/31/2024    Diabetic foot exam  04/05/2024    Flu vaccine (1) 08/01/2024    COVID-19 Vaccine (4 - 2024-25 season) 09/01/2024    Annual Wellness Visit (Medicare)  Never done    A1C test (Diabetic or Prediabetic)  03/04/2026    Diabetic Alb to Cr ratio (uACR) test  03/04/2026    Lipids  03/04/2026    Depression Monitoring

## 2025-03-25 NOTE — PROGRESS NOTES
Nancy Duran is a 74 y.o. female here for   Chief Complaint   Patient presents with    Follow-up       1. Have you been to the ER, urgent care clinic since your last visit?  Hospitalized since your last visit? -    2. Have you seen or consulted any other health care providers outside of the [facility] since your last visit?  Include any pap smears or colon screening.-

## 2025-03-25 NOTE — PATIENT INSTRUCTIONS
How To Care for Yourself After Cryotherapy  Starting the day after your procedure, wash the treated area gently with fragrance-free soap and water every day.  Put Vaseline® or Aquaphor® on the treated area every day for 2 weeks. This will help the area heal and will keep it from crusting. If the treated area does develop a crust, you can put petroleum jelly (Vaseline) on the area until the crust falls off.  Leave the treated area uncovered. If you have any drainage, you can cover the area with a bandage (Band-Aid®).  If you have any bleeding, press firmly on the area with a clean gauze pad for 15 minutes. If the bleeding doesn’t stop, repeat this step. If the bleeding still hasn’t stopped after repeating this step, call your doctor’s office.  Don’t use scented soap, makeup, or lotion on the treated area until it’s fully healed. This will usually be at least 10 days after your procedure.  You may lose some hair on the treated area. This depends on how deep the freezing went. This hair loss may be permanent.  Once the treated area has healed, apply a broad-spectrum sunscreen with an SPF of at least 30 to the area to protect it from scarring.  You may have discoloration (pinkness, redness, or lighter or darker skin) at the treated area for up to 1 year after your procedure. Some people may have it for even longer, or it may be permanent.

## 2025-03-25 NOTE — PROGRESS NOTES
Chief Complaint   Patient presents with    Follow-up     /76 (BP Site: Right Upper Arm, Patient Position: Sitting, BP Cuff Size: Medium Adult)   Pulse 61   Temp 97.4 °F (36.3 °C) (Temporal)   Resp 20   Ht 1.626 m (5' 4\")   Wt 67.5 kg (148 lb 14.4 oz)   SpO2 97%   BMI 25.56 kg/m²   Nancy Duran is a 74 y.o. female here for   Chief Complaint   Patient presents with    Follow-up       1. Have you been to the ER, urgent care clinic since your last visit?  Hospitalized since your last visit? -  NO  2. Have you seen or consulted any other health care providers outside of the [facility] since your last visit?  Include any pap smears or colon screening.-NO

## 2025-03-26 LAB — NONINV COLON CA DNA+OCC BLD SCRN STL QL: POSITIVE

## 2025-03-27 ENCOUNTER — TELEPHONE (OUTPATIENT)
Facility: CLINIC | Age: 75
End: 2025-03-27

## 2025-03-27 NOTE — TELEPHONE ENCOUNTER
Pt states that she can't get the dexcom for a couple of weeks and she is out and wants to know if the freestyle can be called in for her.     She also stated that when her skin tags were froze off at her last appointment they have left blisters and burned her.      She would like a call back on what to do and if the freestyle can be filled for her

## 2025-03-28 PROBLEM — L91.8 MULTIPLE ACQUIRED SKIN TAGS: Status: ACTIVE | Noted: 2025-03-28

## 2025-03-28 NOTE — ASSESSMENT & PLAN NOTE
Procedure  Cryotherapy: After discussing risk of dyspigmentation, scarring, recurrence, and what to expect post-procedure, patient agreed to proceed. Lesion(s) identified and treated with cryotherapy. Advised patient on post-procedure care. Procedure tolerated well, no complications.   Indication Skin Tag.   Site (s) left lower abdomen, right lower back, left axilla, right axilla  Number treated 5.

## 2025-04-05 PROBLEM — Z01.419 WELL WOMAN EXAM: Status: RESOLVED | Noted: 2025-03-06 | Resolved: 2025-04-05

## 2025-04-05 PROBLEM — Z12.11 COLON CANCER SCREENING: Status: RESOLVED | Noted: 2025-03-06 | Resolved: 2025-04-05

## 2025-04-05 PROBLEM — Z13.820 SCREENING FOR OSTEOPOROSIS: Status: RESOLVED | Noted: 2025-03-06 | Resolved: 2025-04-05

## 2025-04-05 PROBLEM — Z12.31 ENCOUNTER FOR SCREENING MAMMOGRAM FOR MALIGNANT NEOPLASM OF BREAST: Status: RESOLVED | Noted: 2025-03-06 | Resolved: 2025-04-05

## 2025-04-10 ENCOUNTER — TELEPHONE (OUTPATIENT)
Facility: CLINIC | Age: 75
End: 2025-04-10

## 2025-04-10 DIAGNOSIS — N18.32 TYPE 2 DIABETES MELLITUS WITH STAGE 3B CHRONIC KIDNEY DISEASE, WITH LONG-TERM CURRENT USE OF INSULIN (HCC): ICD-10-CM

## 2025-04-10 DIAGNOSIS — E11.22 TYPE 2 DIABETES MELLITUS WITH STAGE 3B CHRONIC KIDNEY DISEASE, WITH LONG-TERM CURRENT USE OF INSULIN (HCC): ICD-10-CM

## 2025-04-10 DIAGNOSIS — Z79.4 TYPE 2 DIABETES MELLITUS WITH STAGE 3B CHRONIC KIDNEY DISEASE, WITH LONG-TERM CURRENT USE OF INSULIN (HCC): Primary | ICD-10-CM

## 2025-04-10 DIAGNOSIS — E11.22 TYPE 2 DIABETES MELLITUS WITH STAGE 3B CHRONIC KIDNEY DISEASE, WITH LONG-TERM CURRENT USE OF INSULIN (HCC): Primary | ICD-10-CM

## 2025-04-10 DIAGNOSIS — K21.9 GASTROESOPHAGEAL REFLUX DISEASE WITHOUT ESOPHAGITIS: Primary | ICD-10-CM

## 2025-04-10 DIAGNOSIS — Z79.4 TYPE 2 DIABETES MELLITUS WITH STAGE 3B CHRONIC KIDNEY DISEASE, WITH LONG-TERM CURRENT USE OF INSULIN (HCC): ICD-10-CM

## 2025-04-10 DIAGNOSIS — N18.32 TYPE 2 DIABETES MELLITUS WITH STAGE 3B CHRONIC KIDNEY DISEASE, WITH LONG-TERM CURRENT USE OF INSULIN (HCC): Primary | ICD-10-CM

## 2025-04-10 RX ORDER — PANTOPRAZOLE SODIUM 20 MG/1
20 TABLET, DELAYED RELEASE ORAL DAILY
Qty: 30 TABLET | Refills: 1 | Status: SHIPPED | OUTPATIENT
Start: 2025-04-10

## 2025-04-10 RX ORDER — ACYCLOVIR 800 MG/1
TABLET ORAL
Qty: 1 EACH | Refills: 3 | Status: SHIPPED | OUTPATIENT
Start: 2025-04-10

## 2025-04-10 RX ORDER — INSULIN GLARGINE 100 [IU]/ML
45 INJECTION, SOLUTION SUBCUTANEOUS NIGHTLY
Qty: 5 ADJUSTABLE DOSE PRE-FILLED PEN SYRINGE | Refills: 1 | Status: SHIPPED | OUTPATIENT
Start: 2025-04-10

## 2025-04-10 NOTE — TELEPHONE ENCOUNTER
Patient is requesting a refill on pantoprazole (PROTONIX) 20 MG table and LANTUS SOLOSTAR 100 UNIT/ML injection pen. [facility]acy [facility] ( on [facility])

## 2025-04-11 ENCOUNTER — TELEPHONE (OUTPATIENT)
Age: 75
End: 2025-04-11

## 2025-04-11 NOTE — TELEPHONE ENCOUNTER
The patient states that she has seen Dr. Potts, hepatologist, and she confirmed that there is no trace of the hepatitis B virus in her blood, she states she is able to start her MS infusion. Dr. Potts is faxing those results to the office. The patient would like a call to let her know when she can set up the infusion.

## 2025-04-14 ENCOUNTER — TELEPHONE (OUTPATIENT)
Age: 75
End: 2025-04-14

## 2025-04-15 DIAGNOSIS — K21.9 GASTROESOPHAGEAL REFLUX DISEASE WITHOUT ESOPHAGITIS: ICD-10-CM

## 2025-04-15 DIAGNOSIS — N18.32 TYPE 2 DIABETES MELLITUS WITH STAGE 3B CHRONIC KIDNEY DISEASE, WITH LONG-TERM CURRENT USE OF INSULIN (HCC): ICD-10-CM

## 2025-04-15 DIAGNOSIS — Z79.4 TYPE 2 DIABETES MELLITUS WITH STAGE 3B CHRONIC KIDNEY DISEASE, WITH LONG-TERM CURRENT USE OF INSULIN (HCC): ICD-10-CM

## 2025-04-15 DIAGNOSIS — E11.22 TYPE 2 DIABETES MELLITUS WITH STAGE 3B CHRONIC KIDNEY DISEASE, WITH LONG-TERM CURRENT USE OF INSULIN (HCC): ICD-10-CM

## 2025-04-15 RX ORDER — INSULIN GLARGINE 100 [IU]/ML
45 INJECTION, SOLUTION SUBCUTANEOUS NIGHTLY
Qty: 5 ADJUSTABLE DOSE PRE-FILLED PEN SYRINGE | Refills: 1 | OUTPATIENT
Start: 2025-04-15

## 2025-04-15 RX ORDER — AMLODIPINE BESYLATE 10 MG/1
TABLET ORAL
Qty: 90 TABLET | Refills: 1 | Status: SHIPPED | OUTPATIENT
Start: 2025-04-15

## 2025-04-15 RX ORDER — PANTOPRAZOLE SODIUM 20 MG/1
20 TABLET, DELAYED RELEASE ORAL DAILY
Qty: 30 TABLET | Refills: 1 | OUTPATIENT
Start: 2025-04-15

## 2025-04-15 NOTE — TELEPHONE ENCOUNTER
Patient is requesting refills on Lantus Solostar , Amlodipine, and Pantoprazole medications sent to [facility] (on file). [phone]      LANTUS SOLOSTAR 100 UNIT/ML injection pen       pantoprazole (PROTONIX) 20 MG tablet       amLODIPine (NORVASC) 10 MG tablet

## 2025-04-16 DIAGNOSIS — Z79.4 TYPE 2 DIABETES MELLITUS WITH STAGE 3B CHRONIC KIDNEY DISEASE, WITH LONG-TERM CURRENT USE OF INSULIN (HCC): ICD-10-CM

## 2025-04-16 DIAGNOSIS — N18.32 TYPE 2 DIABETES MELLITUS WITH STAGE 3B CHRONIC KIDNEY DISEASE, WITH LONG-TERM CURRENT USE OF INSULIN (HCC): ICD-10-CM

## 2025-04-16 DIAGNOSIS — K21.9 GASTROESOPHAGEAL REFLUX DISEASE WITHOUT ESOPHAGITIS: ICD-10-CM

## 2025-04-16 DIAGNOSIS — E11.22 TYPE 2 DIABETES MELLITUS WITH STAGE 3B CHRONIC KIDNEY DISEASE, WITH LONG-TERM CURRENT USE OF INSULIN (HCC): ICD-10-CM

## 2025-04-16 NOTE — TELEPHONE ENCOUNTER
Refill request (pt calling)    Pt is out    LANTUS SOLOSTAR 100 UNIT/ML injection pen     pantoprazole (PROTONIX) 20 MG tablet     [facility] [address]

## 2025-04-17 DIAGNOSIS — L85.3 XEROSIS CUTIS: ICD-10-CM

## 2025-04-17 DIAGNOSIS — E11.21 TYPE 2 DIABETES WITH NEPHROPATHY (HCC): ICD-10-CM

## 2025-04-17 RX ORDER — INSULIN GLARGINE 100 [IU]/ML
45 INJECTION, SOLUTION SUBCUTANEOUS NIGHTLY
Qty: 5 ADJUSTABLE DOSE PRE-FILLED PEN SYRINGE | Refills: 1 | Status: SHIPPED | OUTPATIENT
Start: 2025-04-17

## 2025-04-17 RX ORDER — INSULIN GLARGINE 100 [IU]/ML
45 INJECTION, SOLUTION SUBCUTANEOUS NIGHTLY
Qty: 5 ADJUSTABLE DOSE PRE-FILLED PEN SYRINGE | Refills: 1 | OUTPATIENT
Start: 2025-04-17

## 2025-04-17 RX ORDER — PANTOPRAZOLE SODIUM 20 MG/1
20 TABLET, DELAYED RELEASE ORAL DAILY
Qty: 90 TABLET | Refills: 1 | Status: SHIPPED | OUTPATIENT
Start: 2025-04-17

## 2025-04-17 RX ORDER — PANTOPRAZOLE SODIUM 20 MG/1
20 TABLET, DELAYED RELEASE ORAL DAILY
Qty: 30 TABLET | Refills: 1 | Status: SHIPPED | OUTPATIENT
Start: 2025-04-17

## 2025-04-17 NOTE — TELEPHONE ENCOUNTER
PCP: Josefina Gamez MD    Last appt: 3/25/2025    Future Appointments   Date Time Provider Department Center   4/30/2025  2:00 PM [facility]   9/25/2025 11:00 AM Josefina Gamez MD [facility]       Requested Prescriptions     Pending Prescriptions Disp Refills    LANTUS SOLOSTAR 100 UNIT/ML injection pen 5 Adjustable Dose Pre-filled Pen Syringe 1     Sig: Inject 45 Units into the skin nightly    pantoprazole (PROTONIX) 20 MG tablet 30 tablet 1     Sig: Take 1 tablet by mouth daily

## 2025-04-17 NOTE — TELEPHONE ENCOUNTER
Refill will be sent but please contact patient and let her know that her lantus was sent 1 week ago.   ______________    LANTUS SOLOSTAR 100 UNIT/ML injection pen [[id number]]    Order Details  Dose: 45 Units Route: SubCUTAneous Frequency: NIGHTLY  Dispense Quantity: 5 Adjustable Dose Pre-filled Pen Syringe Refills: 1        Sig: Inject 45 Units into the skin nightly       Start Date: 04/10/25 End Date: --  Written Date: 04/10/25 Expiration Date: 04/10/26    Associated Diagnoses: Type 2 diabetes mellitus with stage 3b chronic kidney disease, with long-term current use of insulin (HCC) [E11.22, N18.32, Z79.4]  Original Order: LANTUS SOLOSTAR 100 UNIT/ML injection pen [[id number]]  Providers    Authorizing Provider: Josefina Gamez MD NPI: [id number]  Ordering User: Josefina Gamez MD        Pharmacy    [facility]/pharmacy #[id number] - [place], [place] - [address] [facility] [phone] - F [phone]  [facility] [address] [place] [address]  Phone: [phone]  Fax: [phone]

## 2025-04-18 RX ORDER — AMMONIUM LACTATE 12 G/100G
LOTION TOPICAL
Qty: 225 G | Refills: 3 | Status: CANCELLED | OUTPATIENT
Start: 2025-04-18

## 2025-04-18 RX ORDER — INSULIN GLARGINE-YFGN 100 [IU]/ML
INJECTION, SOLUTION SUBCUTANEOUS
Qty: 15 ML | Refills: 2 | Status: CANCELLED | OUTPATIENT
Start: 2025-04-18

## 2025-04-23 ENCOUNTER — TELEPHONE (OUTPATIENT)
Facility: CLINIC | Age: 75
End: 2025-04-23

## 2025-04-23 NOTE — TELEPHONE ENCOUNTER
Spoke with patient, patient states she hasn't received her insulin in a month. I called the pharmacy and the patients insurance and it is an insurance problem and the patient was instructed to call her insurance company.

## 2025-04-24 DIAGNOSIS — L85.3 XEROSIS CUTIS: ICD-10-CM

## 2025-04-24 RX ORDER — AMMONIUM LACTATE 12 G/100G
LOTION TOPICAL
Qty: 225 G | Refills: 3 | Status: SHIPPED | OUTPATIENT
Start: 2025-04-24

## 2025-04-25 ENCOUNTER — TELEPHONE (OUTPATIENT)
Age: 75
End: 2025-04-25

## 2025-04-25 NOTE — TELEPHONE ENCOUNTER
----- Message from Dr. Sarah Roman DO sent at 4/25/2025 11:38 AM EDT -----  Regarding: RE: appt 4/30/2025  Need MRI Brain repeated prior to f/u. RMD  ----- Message -----  From: Angie Ibarra LPN  Sent: 4/24/2025   4:03 PM EDT  To: Sarah Roman DO  Subject: appt 4/30/2025                                   MRI not completed yet ok to keep FU

## 2025-04-25 NOTE — TELEPHONE ENCOUNTER
Called and spoke with the Pt. To notify of the following from Dr. Roman:    Need MRI Brain repeated prior to f/u. CT     Gave her the number to call and scheduled. Then she will call our office to reschedule for after that.

## 2025-05-02 RX ORDER — DILTIAZEM HYDROCHLORIDE 120 MG/1
120 CAPSULE, EXTENDED RELEASE ORAL DAILY
COMMUNITY
End: 2025-05-02 | Stop reason: SDUPTHER

## 2025-05-02 RX ORDER — DILTIAZEM HYDROCHLORIDE 120 MG/1
120 CAPSULE, EXTENDED RELEASE ORAL DAILY
Qty: 90 CAPSULE | Refills: 1 | Status: SHIPPED | OUTPATIENT
Start: 2025-05-02

## 2025-05-02 NOTE — TELEPHONE ENCOUNTER
Received a fax refill request from pharmacy to refill diltiazem 24h 120 mg, I don't see this medication on patients list, the pharmacy said it was last filled on 2-. Added to list and pended medication if needed.

## 2025-05-12 ENCOUNTER — HOSPITAL ENCOUNTER (OUTPATIENT)
Age: 75
Discharge: HOME | End: 2025-05-15
Payer: MEDICARE

## 2025-05-12 DIAGNOSIS — G35 MULTIPLE SCLEROSIS (HCC): ICD-10-CM

## 2025-05-12 PROCEDURE — 70553 MRI BRAIN STEM W/O & W/DYE: CPT

## 2025-05-12 PROCEDURE — 6360000004 HC RX CONTRAST MEDICATION: Performed by: RADIOLOGY

## 2025-05-12 PROCEDURE — A9579 GAD-BASE MR CONTRAST NOS,1ML: HCPCS | Performed by: RADIOLOGY

## 2025-05-12 RX ADMIN — GADOTERIDOL 13 ML: 279.3 INJECTION, SOLUTION INTRAVENOUS at 12:22

## 2025-05-16 DIAGNOSIS — Z79.4 TYPE 2 DIABETES MELLITUS WITH STAGE 3B CHRONIC KIDNEY DISEASE, WITH LONG-TERM CURRENT USE OF INSULIN (HCC): ICD-10-CM

## 2025-05-16 DIAGNOSIS — N18.32 TYPE 2 DIABETES MELLITUS WITH STAGE 3B CHRONIC KIDNEY DISEASE, WITH LONG-TERM CURRENT USE OF INSULIN (HCC): ICD-10-CM

## 2025-05-16 DIAGNOSIS — E11.22 TYPE 2 DIABETES MELLITUS WITH STAGE 3B CHRONIC KIDNEY DISEASE, WITH LONG-TERM CURRENT USE OF INSULIN (HCC): ICD-10-CM

## 2025-05-20 RX ORDER — INSULIN GLARGINE-YFGN 100 [IU]/ML
INJECTION, SOLUTION SUBCUTANEOUS
Qty: 9 ML | Refills: 0 | Status: SHIPPED | OUTPATIENT
Start: 2025-05-20 | End: 2025-05-21

## 2025-05-21 ENCOUNTER — OFFICE VISIT (OUTPATIENT)
Age: 75
End: 2025-05-21
Payer: MEDICARE

## 2025-05-21 VITALS
OXYGEN SATURATION: 97 % | WEIGHT: 148 LBS | BODY MASS INDEX: 25.27 KG/M2 | SYSTOLIC BLOOD PRESSURE: 128 MMHG | HEIGHT: 64 IN | DIASTOLIC BLOOD PRESSURE: 80 MMHG | HEART RATE: 62 BPM

## 2025-05-21 DIAGNOSIS — B19.10 HEPATITIS B INFECTION WITHOUT DELTA AGENT WITHOUT HEPATIC COMA, UNSPECIFIED CHRONICITY: ICD-10-CM

## 2025-05-21 DIAGNOSIS — G35 MULTIPLE SCLEROSIS (HCC): Primary | ICD-10-CM

## 2025-05-21 PROCEDURE — 1123F ACP DISCUSS/DSCN MKR DOCD: CPT | Performed by: PSYCHIATRY & NEUROLOGY

## 2025-05-21 PROCEDURE — 1159F MED LIST DOCD IN RCRD: CPT | Performed by: PSYCHIATRY & NEUROLOGY

## 2025-05-21 PROCEDURE — G8399 PT W/DXA RESULTS DOCUMENT: HCPCS | Performed by: PSYCHIATRY & NEUROLOGY

## 2025-05-21 PROCEDURE — 99214 OFFICE O/P EST MOD 30 MIN: CPT | Performed by: PSYCHIATRY & NEUROLOGY

## 2025-05-21 PROCEDURE — 3079F DIAST BP 80-89 MM HG: CPT | Performed by: PSYCHIATRY & NEUROLOGY

## 2025-05-21 PROCEDURE — 1036F TOBACCO NON-USER: CPT | Performed by: PSYCHIATRY & NEUROLOGY

## 2025-05-21 PROCEDURE — G8419 CALC BMI OUT NRM PARAM NOF/U: HCPCS | Performed by: PSYCHIATRY & NEUROLOGY

## 2025-05-21 PROCEDURE — 3017F COLORECTAL CA SCREEN DOC REV: CPT | Performed by: PSYCHIATRY & NEUROLOGY

## 2025-05-21 PROCEDURE — 1090F PRES/ABSN URINE INCON ASSESS: CPT | Performed by: PSYCHIATRY & NEUROLOGY

## 2025-05-21 PROCEDURE — 1126F AMNT PAIN NOTED NONE PRSNT: CPT | Performed by: PSYCHIATRY & NEUROLOGY

## 2025-05-21 PROCEDURE — 3074F SYST BP LT 130 MM HG: CPT | Performed by: PSYCHIATRY & NEUROLOGY

## 2025-05-21 PROCEDURE — G8427 DOCREV CUR MEDS BY ELIG CLIN: HCPCS | Performed by: PSYCHIATRY & NEUROLOGY

## 2025-05-21 RX ORDER — INSULIN GLARGINE 100 [IU]/ML
INJECTION, SOLUTION SUBCUTANEOUS NIGHTLY
COMMUNITY

## 2025-05-21 NOTE — PROGRESS NOTES
Neurology Clinic Follow up Note    Patient ID:  Nancy Duran  [id number]  1950      Ms. Duran is here for follow up today of MS     Last Appointment:  2/10/2025, SILVESTRE Ureña    Interval History:   Nancy Duran is a right handed female with a PMH including DM, HTN, HCV, remote stroke presenting for f/u of MS.      Date of onset: 1997-experienced knee buckling with falls, paresthesias L side later progressing to both feet, +fatigue, +optic neuritis b/l  Dx made via MRI and LP.      Date of diagnosis: 1999    Disease course from Onset: RRMS    Disease course last year: RRMS, stable    Last imaging:   MRI Brain [facility] 7/10/20: stable MS lesion burden, no evidence of active demyelination  MRI Brain [facility] 12/29/19  Moderately extensive white matter disease, c/w multiple sclerosis, possible slight increase in lesions of the corpus callosum.  No enhancing lesions.    [facility] MRI Brain summer 2024 per pt-no records available for review     Medications for MS Used in the Past:   Vumerity (2/2020-2/2023)  Avonex- doesn't like injections due to injection site tenderness  Tecfidera- could not swallow pills, +nausea, flushing    Interval history:  She moved to [place] over the past year but is now back to help her youngest daughter who lives in [place].   She was being seen locally in [place] by Neurology. She was maintained on Ocrevus infusions with last infusion ~5/2024. No recent MS relapses reported. Now followed by Hepatology since last visit due to h/o HBV/HCV.   Denies new weakness, numbness/tingling but reports occasional cramping into the distal LE b/l. No new vision loss, gait instability, urinary/bowel incontinence, +nocturia. Admits to some forgetfulness, stable since last visit.      PMHx/ PSHx/ FHx/ SHx:  Reviewed and unchanged previous visit.   Past Medical History:   Diagnosis Date    Arrhythmia     irregular heatbeat/heart murmur - evaluated and ok    Arthritis     OSTEO A & OSTEOPOROSIS     Autoimmune disease

## 2025-05-27 ENCOUNTER — TELEPHONE (OUTPATIENT)
Facility: CLINIC | Age: 75
End: 2025-05-27

## 2025-05-28 DIAGNOSIS — R19.5 POSITIVE COLORECTAL CANCER SCREENING USING COLOGUARD TEST: Primary | ICD-10-CM

## 2025-05-28 NOTE — TELEPHONE ENCOUNTER
Patient states she had a cologuard  done back in March and she saw on MyChart that it came back positive.  Patient needs a referral ASAP to have a colonoscopy.  Please advise.

## 2025-06-11 ENCOUNTER — TELEPHONE (OUTPATIENT)
Facility: CLINIC | Age: 75
End: 2025-06-11

## 2025-06-11 ENCOUNTER — HOSPITAL ENCOUNTER (OUTPATIENT)
Facility: HOSPITAL | Age: 75
Discharge: HOME | End: 2025-06-14
Attending: STUDENT IN AN ORGANIZED HEALTH CARE EDUCATION/TRAINING PROGRAM
Payer: MEDICARE

## 2025-06-11 VITALS — WEIGHT: 145 LBS | HEIGHT: 64 IN | BODY MASS INDEX: 24.75 KG/M2

## 2025-06-11 VITALS — HEIGHT: 64 IN | WEIGHT: 148 LBS | BODY MASS INDEX: 25.27 KG/M2

## 2025-06-11 DIAGNOSIS — Z13.820 SCREENING FOR OSTEOPOROSIS: ICD-10-CM

## 2025-06-11 DIAGNOSIS — M81.0 AGE-RELATED OSTEOPOROSIS WITHOUT CURRENT PATHOLOGICAL FRACTURE: ICD-10-CM

## 2025-06-11 DIAGNOSIS — Z12.31 ENCOUNTER FOR SCREENING MAMMOGRAM FOR MALIGNANT NEOPLASM OF BREAST: ICD-10-CM

## 2025-06-11 PROCEDURE — 77063 BREAST TOMOSYNTHESIS BI: CPT

## 2025-06-11 PROCEDURE — 77080 DXA BONE DENSITY AXIAL: CPT

## 2025-06-11 NOTE — TELEPHONE ENCOUNTER
----- Message from Maunel GARCES sent at 6/11/2025  3:22 PM EDT -----  Regarding: ECC Referral Request  ECC Referral Request    Reason for referral request: Specialty Provider    Specialist/Lab/Test patient is requesting (if known):endocrinologist    Specialist Phone Number (if applicable):    Additional Information The patient need's a referral to endocrinologist   --------------------------------------------------------------------------------------------------------------------------    Relationship to Patient: Self     Call Back Information: OK to leave message on voicemail  Preferred Call Back Number: Phone [phone]

## 2025-06-12 DIAGNOSIS — N18.32 TYPE 2 DIABETES MELLITUS WITH STAGE 3B CHRONIC KIDNEY DISEASE, WITH LONG-TERM CURRENT USE OF INSULIN (HCC): ICD-10-CM

## 2025-06-12 DIAGNOSIS — Z79.4 TYPE 2 DIABETES MELLITUS WITH STAGE 3B CHRONIC KIDNEY DISEASE, WITH LONG-TERM CURRENT USE OF INSULIN (HCC): ICD-10-CM

## 2025-06-12 DIAGNOSIS — E11.22 TYPE 2 DIABETES MELLITUS WITH STAGE 3B CHRONIC KIDNEY DISEASE, WITH LONG-TERM CURRENT USE OF INSULIN (HCC): ICD-10-CM

## 2025-06-12 DIAGNOSIS — E11.21 TYPE 2 DIABETES WITH NEPHROPATHY (HCC): ICD-10-CM

## 2025-06-12 RX ORDER — EMPAGLIFLOZIN 10 MG/1
10 TABLET, FILM COATED ORAL DAILY
Qty: 90 TABLET | Refills: 1 | Status: SHIPPED | OUTPATIENT
Start: 2025-06-12

## 2025-06-12 NOTE — TELEPHONE ENCOUNTER
Insulin Pen Needle (B-D UF III MINI PEN NEEDLES) 31G X 5 MM MISC 100 each 4 3/27/2024 --    Sig: USE TO INJECT INSULIN      Free Style Lebra monitor kit with test strips    Pharmacy [facility] [address]    Last visit 3/25/25  Future appt 9/25/25

## 2025-06-12 NOTE — TELEPHONE ENCOUNTER
Patient has an appointment with endo scheduled for 1/2026. She has agreed for me to continue managing her diabetes until that time.

## 2025-06-16 RX ORDER — FLURBIPROFEN SODIUM 0.3 MG/ML
SOLUTION/ DROPS OPHTHALMIC
Qty: 100 EACH | Refills: 11 | Status: SHIPPED | OUTPATIENT
Start: 2025-06-16

## 2025-06-16 NOTE — TELEPHONE ENCOUNTER
Patient requesting an urgent update on her prescription as she has not been able to take her insulin for three days.

## 2025-06-18 ENCOUNTER — RESULTS FOLLOW-UP (OUTPATIENT)
Facility: CLINIC | Age: 75
End: 2025-06-18

## 2025-07-02 ENCOUNTER — ANESTHESIA EVENT (OUTPATIENT)
Facility: HOSPITAL | Age: 75
End: 2025-07-02
Payer: MEDICARE

## 2025-07-02 NOTE — ANESTHESIA PRE PROCEDURE
Department of Anesthesiology  Preprocedure Note       Name:  Nancy Duran   Age:  74 y.o.  :  1950                                          MRN:  [id number]         Date:  2025      Surgeon: Surgeon(s):  Luis Decker MD    Procedure: Procedure(s):  COLONOSCOPY    Medications prior to admission:   Prior to Admission medications    Medication Sig Start Date End Date Taking? Authorizing Provider   Insulin Pen Needle (B-D UF III MINI PEN NEEDLES) 31G X 5 MM MISC USE TO INJECT INSULIN 25   Josefina Gamez MD   JARDIANCE 10 MG tablet Take 1 tablet by mouth daily 25   Josefina Gamez MD   insulin glargine (LANTUS) 100 UNIT/ML injection vial Inject 25 Units into the skin nightly    Dianna Alcantara MD   ammonium lactate (LAC-HYDRIN) 12 % lotion APPLY TOPICALLY AND RUB IN  TO AFFECTED AREA ON LEGS  TWICE DAILY 25   Josefina Gamez MD   pantoprazole (PROTONIX) 20 MG tablet Take 1 tablet by mouth daily 25   Josefina Gamez MD   amLODIPine (NORVASC) 10 MG tablet Take 1 tablet by mouth daily. 4/15/25   Josefina Gamez MD   metoprolol succinate (TOPROL XL) 200 MG extended release tablet Take 1 tablet by mouth daily 3/14/25   Josefina Gamez MD   losartan (COZAAR) 100 MG tablet Take 1 tablet by mouth daily 3/14/25   Josefina Gamez MD   hydrALAZINE (APRESOLINE) 25 MG tablet Take 1 tablet by mouth as needed    Dianna Alcantara MD   ocrelizumab (OCREVUS) 300 MG/10ML SOLN injection Infuse 10 mLs intravenously once    Dianna Alcantara MD   atorvastatin (LIPITOR) 40 MG tablet Take 1 tablet by mouth every evening 1/15/25 6/18/25  Dianna Alcantara MD   Continuous Glucose Sensor (DEXCOM G7 SENSOR) MISC by Does not apply route    Dianna Alcantara MD   denosumab (PROLIA) 60 MG/ML SOSY SC injection Inject 1 mL into the skin once    Dianna Alcantara MD   cloNIDine (CATAPRES) 0.1 MG tablet TAKE 1 TABLET BY MOUTH  TWICE DAILY  Patient taking differently: Takes 0.5 tablet

## 2025-07-03 ENCOUNTER — ANESTHESIA (OUTPATIENT)
Facility: HOSPITAL | Age: 75
End: 2025-07-03
Payer: MEDICARE

## 2025-07-03 ENCOUNTER — HOSPITAL ENCOUNTER (OUTPATIENT)
Facility: HOSPITAL | Age: 75
Setting detail: OUTPATIENT SURGERY
Discharge: HOME | End: 2025-07-03
Attending: INTERNAL MEDICINE | Admitting: INTERNAL MEDICINE
Payer: MEDICARE

## 2025-07-03 VITALS
SYSTOLIC BLOOD PRESSURE: 186 MMHG | DIASTOLIC BLOOD PRESSURE: 75 MMHG | BODY MASS INDEX: 25.04 KG/M2 | HEART RATE: 55 BPM | HEIGHT: 64 IN | TEMPERATURE: 97.1 F | OXYGEN SATURATION: 96 % | WEIGHT: 146.7 LBS | RESPIRATION RATE: 16 BRPM

## 2025-07-03 LAB
GLUCOSE BLD STRIP.AUTO-MCNC: 102 MG/DL (ref 65–117)
GLUCOSE BLD STRIP.AUTO-MCNC: 88 MG/DL (ref 65–117)
SERVICE CMNT-IMP: NORMAL
SERVICE CMNT-IMP: NORMAL

## 2025-07-03 PROCEDURE — 7100000010 HC PHASE II RECOVERY - FIRST 15 MIN: Performed by: INTERNAL MEDICINE

## 2025-07-03 PROCEDURE — 3600007502: Performed by: INTERNAL MEDICINE

## 2025-07-03 PROCEDURE — 2580000003 HC RX 258: Performed by: INTERNAL MEDICINE

## 2025-07-03 PROCEDURE — 3700000001 HC ADD 15 MINUTES (ANESTHESIA): Performed by: INTERNAL MEDICINE

## 2025-07-03 PROCEDURE — 2500000003 HC RX 250 WO HCPCS

## 2025-07-03 PROCEDURE — 88305 TISSUE EXAM BY PATHOLOGIST: CPT

## 2025-07-03 PROCEDURE — 2709999900 HC NON-CHARGEABLE SUPPLY: Performed by: INTERNAL MEDICINE

## 2025-07-03 PROCEDURE — 3700000000 HC ANESTHESIA ATTENDED CARE: Performed by: INTERNAL MEDICINE

## 2025-07-03 PROCEDURE — 7100000011 HC PHASE II RECOVERY - ADDTL 15 MIN: Performed by: INTERNAL MEDICINE

## 2025-07-03 PROCEDURE — 82962 GLUCOSE BLOOD TEST: CPT

## 2025-07-03 PROCEDURE — 3600007512: Performed by: INTERNAL MEDICINE

## 2025-07-03 PROCEDURE — 6360000002 HC RX W HCPCS

## 2025-07-03 RX ORDER — SODIUM CHLORIDE 0.9 % (FLUSH) 0.9 %
5-40 SYRINGE (ML) INJECTION PRN
Status: DISCONTINUED | OUTPATIENT
Start: 2025-07-03 | End: 2025-07-03 | Stop reason: HOSPADM

## 2025-07-03 RX ORDER — PHENYLEPHRINE HCL IN 0.9% NACL 0.4MG/10ML
SYRINGE (ML) INTRAVENOUS
Status: DISCONTINUED | OUTPATIENT
Start: 2025-07-03 | End: 2025-07-03 | Stop reason: SDUPTHER

## 2025-07-03 RX ORDER — EPHEDRINE SULFATE/0.9% NACL/PF 50 MG/5 ML
SYRINGE (ML) INTRAVENOUS
Status: DISCONTINUED | OUTPATIENT
Start: 2025-07-03 | End: 2025-07-03 | Stop reason: SDUPTHER

## 2025-07-03 RX ORDER — SODIUM CHLORIDE 9 MG/ML
INJECTION, SOLUTION INTRAVENOUS CONTINUOUS
Status: DISCONTINUED | OUTPATIENT
Start: 2025-07-03 | End: 2025-07-03 | Stop reason: HOSPADM

## 2025-07-03 RX ORDER — SIMETHICONE 40MG/0.6ML
40 SUSPENSION, DROPS(FINAL DOSAGE FORM)(ML) ORAL AS NEEDED
Status: DISCONTINUED | OUTPATIENT
Start: 2025-07-03 | End: 2025-07-03 | Stop reason: HOSPADM

## 2025-07-03 RX ORDER — LIDOCAINE HYDROCHLORIDE 20 MG/ML
INJECTION, SOLUTION EPIDURAL; INFILTRATION; INTRACAUDAL; PERINEURAL
Status: DISCONTINUED | OUTPATIENT
Start: 2025-07-03 | End: 2025-07-03 | Stop reason: SDUPTHER

## 2025-07-03 RX ADMIN — PROPOFOL 20 MG: 10 INJECTION, EMULSION INTRAVENOUS at 15:04

## 2025-07-03 RX ADMIN — PROPOFOL 20 MG: 10 INJECTION, EMULSION INTRAVENOUS at 15:10

## 2025-07-03 RX ADMIN — PROPOFOL 20 MG: 10 INJECTION, EMULSION INTRAVENOUS at 15:15

## 2025-07-03 RX ADMIN — Medication 80 MCG: at 15:16

## 2025-07-03 RX ADMIN — PROPOFOL 20 MG: 10 INJECTION, EMULSION INTRAVENOUS at 15:19

## 2025-07-03 RX ADMIN — PROPOFOL 100 MG: 10 INJECTION, EMULSION INTRAVENOUS at 14:58

## 2025-07-03 RX ADMIN — PROPOFOL 20 MG: 10 INJECTION, EMULSION INTRAVENOUS at 15:02

## 2025-07-03 RX ADMIN — PROPOFOL 20 MG: 10 INJECTION, EMULSION INTRAVENOUS at 15:06

## 2025-07-03 RX ADMIN — PROPOFOL 20 MG: 10 INJECTION, EMULSION INTRAVENOUS at 15:18

## 2025-07-03 RX ADMIN — PROPOFOL 20 MG: 10 INJECTION, EMULSION INTRAVENOUS at 15:12

## 2025-07-03 RX ADMIN — PROPOFOL 20 MG: 10 INJECTION, EMULSION INTRAVENOUS at 15:08

## 2025-07-03 RX ADMIN — Medication 5 MG: at 15:01

## 2025-07-03 RX ADMIN — LIDOCAINE HYDROCHLORIDE 40 MG: 20 INJECTION, SOLUTION EPIDURAL; INFILTRATION; INTRACAUDAL; PERINEURAL at 14:58

## 2025-07-03 RX ADMIN — Medication 5 MG: at 15:07

## 2025-07-03 RX ADMIN — Medication 40 MCG: at 15:10

## 2025-07-03 RX ADMIN — PROPOFOL 20 MG: 10 INJECTION, EMULSION INTRAVENOUS at 15:00

## 2025-07-03 RX ADMIN — SODIUM CHLORIDE: 0.9 INJECTION, SOLUTION INTRAVENOUS at 14:17

## 2025-07-03 RX ADMIN — PROPOFOL 20 MG: 10 INJECTION, EMULSION INTRAVENOUS at 15:21

## 2025-07-03 ASSESSMENT — PAIN - FUNCTIONAL ASSESSMENT: PAIN_FUNCTIONAL_ASSESSMENT: NONE - DENIES PAIN

## 2025-07-03 NOTE — H&P
[facility]  Outpatient History and Physical    Patient: Nancy Duran    Physician: Luis Decker MD    Vital Signs: Blood pressure (!) 158/67, pulse 54, resp. rate 13, height 1.626 m (5' 4\"), weight 66.5 kg (146 lb 11.2 oz), SpO2 98%.    Allergies:   Allergies   Allergen Reactions    Penicillins Diarrhea, Rash and Swelling    Alendronate Swelling     Other reaction(s): tongue swelling    Codeine     Sulfa Antibiotics Itching and Rash       Chief Complaint: CRC screening-Positive cologuard    History of Present Illness: CRC screening-Positive cologuard    Indication for Procedure: CRC screening-Positive cologuard    History:  Past Medical History:   Diagnosis Date    Arrhythmia     irregular heatbeat/heart murmur - evaluated and ok    Arthritis     OSTEO A & OSTEOPOROSIS     Autoimmune disease 1999    MS    Chronic pain     all over    Depression     Diabetes (HCC)     GERD (gastroesophageal reflux disease)     hiatal hernia    Hyperlipidemia     Hypertension     LBP (low back pain)     Lipoma 9/3/2013    Liver disease     CHRONIC HEP C - stage 3 fibrosis    MS (multiple sclerosis) (HCC)     Other ill-defined conditions(799.89)     HX DRUG ABUSE. CLEAN FOR 21 YRS.     PUD (peptic ulcer disease)     duodenal ulcer    Stroke (HCC)     Diagnosed 10/2011 - no deficits now    Type 2 diabetes mellitus without complication, with long-term current use of insulin (HCC) 02/25/2019    Urinary incontinence       Past Surgical History:   Procedure Laterality Date    COLONOSCOPY      HYSTERECTOMY (CERVIX STATUS UNKNOWN)      Partial    LIPOMA RESECTION  9/6/13     Excision of lipoma, left posterior thigh      Social History     Socioeconomic History    Marital status: Single     Spouse name: None    Number of children: None    Years of education: None    Highest education level: None   Tobacco Use    Smoking status: Never    Smokeless tobacco: Never   Vaping Use    Vaping status: Never Used

## 2025-07-03 NOTE — PROGRESS NOTES
Endoscopy Case End Note:     Procedure scope was pre-cleaned, per protocol, at bedside by NAIDA Dunham.       Report received from anesthesia.  See anesthesia flowsheet for intra-procedure vital signs and events.    Belongings remain under stretcher with patient.

## 2025-07-03 NOTE — PROGRESS NOTES
ARRIVAL INFORMATION:  Verified patient name and date of birth, scheduled procedure, and informed consent.     Mago Duran  Child oktt  Emergency Contact  [phone]      Belongings with patient include:  Clothing,    GI FOCUSED ASSESSMENT:  Neuro: Awake, alert, oriented x4  Respiratory: even and unlabored   GI: soft and non-distended  EKG Rhythm: normal sinus rhythm    Education:Reviewed general discharge instructions and  information.

## 2025-07-03 NOTE — OP NOTE
[facility]  [facility]  Kapil Decker MD, [facility]  [phone]         July 3, 2025    Colonoscopy Procedure Note  Nancy Duran  :  1950  [place] Medical Record Number: [id number]    Indications:   CRC screening-Positive cologuard  PCP:  Josefina Gamez MD  Anesthesia/Sedation: TIVA  Endoscopist:  Dr. Kapil Decker  Complications:  None  Estimated Blood Loss:  None    Permit:  The indications, risks, benefits and alternatives were reviewed with the patient or their decision maker who was provided an opportunity to ask questions and all questions were answered.  The specific risks of colonoscopy with conscious sedation were reviewed, including but not limited to anesthetic complication, bleeding, adverse drug reaction, missed lesion, infection, IV site reactions, and intestinal perforation which would lead to the need for surgical repair.  Alternatives to colonoscopy including radiographic imaging, observation without testing, or laboratory testing were reviewed including the limitations of those alternatives.  After considering the options and having all their questions answered, the patient or their decision maker provided both verbal and written consent to proceed.        Procedure in Detail:  After obtaining informed consent, positioning of the patient in the left lateral decubitus position, and conduction of a pre-procedure pause or \"time out\" the endoscope was introduced into the anus and advanced to the cecum, which was identified by the ileocecal valve and appendiceal orifice.  The quality of the colonic preparation was fair.  A careful inspection was made as the colonoscope was withdrawn, findings and interventions are described below.    Findings:   - CLAIRE normal.  - There is a single sessile polyp in the descending colon, 12 to 14 mm in size, removed by hot snare and retrieved completely.  - 8 colon polyps in the  cecum and ascending colon, 3 to 12 mm in size, removed by cold snare x 7 and hot snare x 1.  Retrieved completely.  - There is mild to moderate diverticulosis in the ascending colon, sigmoid and descending colon.  - Retroflexion in the rectum revealed small internal hemorrhoids.    Specimens:    See above    Complications:   None; patient tolerated the procedure well.    Impression:  - 3-12 mm colon polyps in cecum, ascending colon x8 s/p hot snare x1, cold snare x7  - 14 mm descending colon polyp s/p hot snare resection  - Ascending, descending and sigmoid diverticulosis.    Recommendations:   -Await pathology results  -Resume diet as tolerated  - Resume all home medications today.  - Recall colonoscopy in 1 year, pending pathology.      Thank you for entrusting me with this patient's care.  Please do not hesitate to contact me with any questions or if I can be of assistance with any of your other patients' GI needs.    Signed By: Luis Decker MD                        July 3, 2025      Surgical assistant none.  Implants none unless specified.

## 2025-07-03 NOTE — ANESTHESIA POSTPROCEDURE EVALUATION
Department of Anesthesiology  Postprocedure Note    Patient: Nancy Duran  MRN: [id number]  YOB: 1950  Date of evaluation: 7/3/2025    Procedure Summary       Date: 07/03/25 Room / Location: [facility] ENDO 01 / [facility] ENDOSCOPY    Anesthesia Start: 1453 Anesthesia Stop: 1525    Procedure: COLONOSCOPY (Lower GI Region) Diagnosis:       Colon cancer screening      (Colon cancer screening [Z12.11])    Surgeons: Luis Decker MD Responsible Provider: Mateo Harris MD    Anesthesia Type: MAC ASA Status: 3            Anesthesia Type: MAC    Teresa Phase I: Teresa Score: 10    Teresa Phase II: Teresa Score: 9    Anesthesia Post Evaluation    Patient location during evaluation: bedside  Patient participation: complete - patient participated  Level of consciousness: awake  Pain score: 0  Airway patency: patent  Nausea & Vomiting: no nausea and no vomiting  Cardiovascular status: hemodynamically stable  Respiratory status: acceptable  Hydration status: stable  Multimodal analgesia pain management approach  Pain management: adequate    No notable events documented.

## 2025-07-03 NOTE — ANESTHESIA POSTPROCEDURE EVALUATION
Department of Anesthesiology  Postprocedure Note    Patient: Nancy Duran  MRN: [id number]  YOB: 1950  Date of evaluation: 7/3/2025    Procedure Summary       Date: 07/03/25 Room / Location: \[facility]\"" ENDO 01 / [facility] ENDOSCOPY    Anesthesia Start: 1453 Anesthesia Stop: 1525    Procedure: COLONOSCOPY (Lower GI Region) Diagnosis:       Colon cancer screening      (Colon cancer screening [Z12.11])    Surgeons: Luis Decker MD Responsible Provider: Mateo Harris MD    Anesthesia Type: MAC ASA Status: 3            Anesthesia Type: MAC    Teresa Phase I: Teresa Score: 10    Teresa Phase II:      Anesthesia Post Evaluation    Patient location during evaluation: bedside  Patient participation: complete - patient participated  Level of consciousness: awake  Pain score: 0  Airway patency: patent  Nausea & Vomiting: no nausea and no vomiting  Cardiovascular status: hemodynamically stable  Respiratory status: acceptable  Hydration status: euvolemic  Pain management: adequate    No notable events documented.

## 2025-07-03 NOTE — PROGRESS NOTES
ARRIVAL INFORMATION:  Verified patient name and date of birth, scheduled procedure, and informed consent.     : Mago cervantes contact number: [phone]  Physician and staff can share information with the .     Receive texts: yes    Belongings with patient include:  Clothing,None    GI FOCUSED ASSESSMENT:  Neuro: Awake, alert, oriented x4  Respiratory: even and unlabored   GI: soft and non-distended  EKG Rhythm: sinus bradycardia    Education:Reviewed general discharge instructions and  information.

## 2025-07-11 ENCOUNTER — TELEPHONE (OUTPATIENT)
Age: 75
End: 2025-07-11

## 2025-07-11 DIAGNOSIS — F41.9 ANXIETY: ICD-10-CM

## 2025-07-11 NOTE — TELEPHONE ENCOUNTER
Ximena from [employer] called stating that the patient had had the Hepatitis and Hep B  pannel and it was denied by the insurance due to the diagnosis code. She would like to know if they could get another diagnosis code that may allow the insurance to cover it.      Information can be faxed or they can recieve a call with the new diagnosis code. If faxed it needs to be signed by the doctor      Phone number: [phone] option 2 then option 1

## 2025-07-14 NOTE — TELEPHONE ENCOUNTER
Please call them with following diagnosis code: Z86.19 h/o Hep B. HERMELINDAD   Called [facility]. And gave the above diagnosis code

## 2025-07-14 NOTE — TELEPHONE ENCOUNTER
PCP: Josefina Gamez MD    Last appt: 3/25/2025    Future Appointments   Date Time Provider Department Center   9/25/2025 11:00 AM Josefina Gamez MD [facility]   12/10/2025  1:00 PM [facility]   1/15/2026 11:10 AM Anitha Hernandez MD [facility]       Requested Prescriptions     Pending Prescriptions Disp Refills    hydrOXYzine HCl (ATARAX) 25 MG tablet [Pharmacy Med Name: HYDROXYZINE HCL 25 MG TABLET] 30 tablet 1     Sig: TAKE 1 TABLET BY MOUTH EVERY DAY AT NIGHT

## 2025-07-15 RX ORDER — HYDROXYZINE HYDROCHLORIDE 25 MG/1
25 TABLET, FILM COATED ORAL NIGHTLY
Qty: 90 TABLET | Refills: 1 | Status: SHIPPED | OUTPATIENT
Start: 2025-07-15

## 2025-07-17 DIAGNOSIS — I10 ESSENTIAL HYPERTENSION: ICD-10-CM

## 2025-07-17 RX ORDER — CLONIDINE HYDROCHLORIDE 0.1 MG/1
0.1 TABLET ORAL 2 TIMES DAILY
Qty: 180 TABLET | Refills: 3 | Status: CANCELLED | OUTPATIENT
Start: 2025-07-17

## 2025-07-17 NOTE — TELEPHONE ENCOUNTER
Patient called to request a refill on medication:    cloNIDine (CATAPRES) 0.1 MG tablet TAKE 1 TABLET BY MOUTH TWICE DAILY  #180    Pharmacy [facility] [address]

## 2025-07-22 DIAGNOSIS — I10 ESSENTIAL HYPERTENSION: ICD-10-CM

## 2025-07-22 RX ORDER — CLONIDINE HYDROCHLORIDE 0.1 MG/1
0.1 TABLET ORAL 2 TIMES DAILY
Qty: 180 TABLET | Refills: 1 | Status: SHIPPED | OUTPATIENT
Start: 2025-07-22

## 2025-08-04 DIAGNOSIS — E11.22 TYPE 2 DIABETES MELLITUS WITH STAGE 3B CHRONIC KIDNEY DISEASE, WITH LONG-TERM CURRENT USE OF INSULIN (HCC): ICD-10-CM

## 2025-08-04 DIAGNOSIS — N18.32 TYPE 2 DIABETES MELLITUS WITH STAGE 3B CHRONIC KIDNEY DISEASE, WITH LONG-TERM CURRENT USE OF INSULIN (HCC): ICD-10-CM

## 2025-08-04 DIAGNOSIS — Z79.4 TYPE 2 DIABETES MELLITUS WITH STAGE 3B CHRONIC KIDNEY DISEASE, WITH LONG-TERM CURRENT USE OF INSULIN (HCC): ICD-10-CM

## 2025-08-06 RX ORDER — ATORVASTATIN CALCIUM 40 MG/1
40 TABLET, FILM COATED ORAL EVERY EVENING
Qty: 90 TABLET | Refills: 1 | Status: SHIPPED | OUTPATIENT
Start: 2025-08-06 | End: 2026-02-19

## 2025-08-12 RX ORDER — METOPROLOL SUCCINATE 200 MG/1
200 TABLET, EXTENDED RELEASE ORAL DAILY
Qty: 90 TABLET | Refills: 0 | Status: SHIPPED | OUTPATIENT
Start: 2025-08-12

## 2025-08-12 RX ORDER — LOSARTAN POTASSIUM 100 MG/1
100 TABLET ORAL DAILY
Qty: 90 TABLET | Refills: 0 | Status: SHIPPED | OUTPATIENT
Start: 2025-08-12

## 2025-09-04 DIAGNOSIS — K21.9 GASTROESOPHAGEAL REFLUX DISEASE WITHOUT ESOPHAGITIS: ICD-10-CM

## 2025-09-04 RX ORDER — PANTOPRAZOLE SODIUM 20 MG/1
20 TABLET, DELAYED RELEASE ORAL DAILY
Qty: 90 TABLET | Refills: 1 | Status: SHIPPED | OUTPATIENT
Start: 2025-09-04

## 2025-09-05 DIAGNOSIS — N18.32 TYPE 2 DIABETES MELLITUS WITH STAGE 3B CHRONIC KIDNEY DISEASE, WITH LONG-TERM CURRENT USE OF INSULIN (HCC): Primary | ICD-10-CM

## 2025-09-05 DIAGNOSIS — I10 ESSENTIAL HYPERTENSION: ICD-10-CM

## 2025-09-05 DIAGNOSIS — E11.22 TYPE 2 DIABETES MELLITUS WITH STAGE 3B CHRONIC KIDNEY DISEASE, WITH LONG-TERM CURRENT USE OF INSULIN (HCC): Primary | ICD-10-CM

## 2025-09-05 DIAGNOSIS — Z79.4 TYPE 2 DIABETES MELLITUS WITH STAGE 3B CHRONIC KIDNEY DISEASE, WITH LONG-TERM CURRENT USE OF INSULIN (HCC): Primary | ICD-10-CM

## 2025-09-05 DIAGNOSIS — F41.9 ANXIETY: ICD-10-CM

## 2025-09-05 RX ORDER — VENLAFAXINE HYDROCHLORIDE 37.5 MG/1
37.5 CAPSULE, EXTENDED RELEASE ORAL DAILY
Qty: 30 CAPSULE | Refills: 3 | Status: SHIPPED | OUTPATIENT
Start: 2025-09-05

## 2025-09-05 RX ORDER — INSULIN GLARGINE 100 [IU]/ML
25 INJECTION, SOLUTION SUBCUTANEOUS NIGHTLY
Qty: 5 ADJUSTABLE DOSE PRE-FILLED PEN SYRINGE | Refills: 3 | Status: SHIPPED | OUTPATIENT
Start: 2025-09-05

## 2025-09-05 RX ORDER — HYDRALAZINE HYDROCHLORIDE 25 MG/1
25 TABLET, FILM COATED ORAL 3 TIMES DAILY
Qty: 90 TABLET | Refills: 3 | Status: SHIPPED | OUTPATIENT
Start: 2025-09-05

## (undated) DEVICE — ENDOSCOPIC KIT COMPLIANCE ENDOKIT

## (undated) DEVICE — SET GRAV CK VLV NEEDLESS ST 3 GANGED 4WAY STPCOCK HI FLO 10

## (undated) DEVICE — ELECTRODE PT RET AD L9FT HI MOIST COND ADH HYDRGEL CORDED

## (undated) DEVICE — TRAP SPEC POLYP REM STRNR CLN DSGN MAGNIFYING WIND DISP

## (undated) DEVICE — CONTAINER SPEC 20 ML LID NEUT BUFF FORMALIN 10 % POLYPR STS

## (undated) DEVICE — IV START KIT: Brand: MEDLINE

## (undated) DEVICE — SNARE ENDOSCP L240CM OD24MM LOOP W10MM RND INSUL STIFF BRAID

## (undated) DEVICE — CUFF BLD PRSS AD CLTH SGL TB W/ BAYNT CONN ROUNDED CORNER

## (undated) DEVICE — TIP SUCT TRNSPAR RIB SURF STD BLB RIG NVENT W/ 5IN1 CONN DYND50138] MEDLINE INDUSTRIES INC]